# Patient Record
Sex: FEMALE | Race: WHITE
[De-identification: names, ages, dates, MRNs, and addresses within clinical notes are randomized per-mention and may not be internally consistent; named-entity substitution may affect disease eponyms.]

---

## 2019-02-19 ENCOUNTER — HOSPITAL ENCOUNTER (OUTPATIENT)
Dept: HOSPITAL 96 - M.INFUS | Age: 79
End: 2019-02-19
Attending: INTERNAL MEDICINE
Payer: SELF-PAY

## 2019-02-19 VITALS — SYSTOLIC BLOOD PRESSURE: 108 MMHG | DIASTOLIC BLOOD PRESSURE: 60 MMHG

## 2019-02-19 VITALS — SYSTOLIC BLOOD PRESSURE: 104 MMHG | DIASTOLIC BLOOD PRESSURE: 57 MMHG

## 2019-02-19 DIAGNOSIS — D50.9: Primary | ICD-10-CM

## 2019-02-19 DIAGNOSIS — C92.20: ICD-10-CM

## 2019-02-19 DIAGNOSIS — I50.9: ICD-10-CM

## 2019-02-19 LAB
HCT VFR BLD CALC: 20.4 % (ref 37–47)
HGB BLD-MCNC: 6.4 GM/DL (ref 12–15)

## 2019-02-19 NOTE — NUR
1130 - ARRIVED TO INFUSION CLINIC ESCORTED BY  AND DAUGHTER, SETTLED
INTO RECLINER. CALL LIGHT IN REACH. HAZEL HUGGER PROVIDED.
1230 - HAZEL HUGGER REMOVED PER PT REQUEST.
1330 - TRANSFERRED INTO BED WITH STAND BY ASSIST. BLOOD TRANSFUSION INFUSING
WITHOUT DIFFICULTY. FAMILY REMAINS AT BEDSIDE.
1430 - ATE FEW BITES OF LUNCH. TAKING IN PO FLUIDS. STAND BY ASSIST TO
AMBULATE SLOW AND STEADY TO BATHROOM.
1605 - 1ST UNIT COMPLETED WITHOUT ANY ADVERSE REACTION.  REMAINS AT
SIDE.
1700 - AMBULATING TO BATHROOM FREQUENTLY TO VOID D/T LASIX GIVEN. ATE FEW
BITES OF DINNER. REPOSITIONING FROM BED TO RECLINER INTERMITTENLY.
1920 - 2ND UNIT OF BLOOD COMPLETED WITHOUT ADVERSE REACTION. DISCUSSED
DISCHARGE INSTRUCTIONS WITH FAMILY. DISMISSED IN WITH IMPROVED SKIN COLOR AND
GAIT. DISMISSED VIA W/C TO FAMILY VEHICLE.

## 2019-03-24 ENCOUNTER — HOSPITAL ENCOUNTER (INPATIENT)
Dept: HOSPITAL 96 - M.ERS | Age: 79
LOS: 16 days | Discharge: NURSING, REHAB OR LONG-TERM CARE | DRG: 853 | End: 2019-04-09
Attending: INTERNAL MEDICINE | Admitting: INTERNAL MEDICINE
Payer: MEDICARE

## 2019-03-24 VITALS — WEIGHT: 131 LBS | HEIGHT: 65 IN | BODY MASS INDEX: 21.83 KG/M2

## 2019-03-24 VITALS — DIASTOLIC BLOOD PRESSURE: 33 MMHG | SYSTOLIC BLOOD PRESSURE: 82 MMHG

## 2019-03-24 DIAGNOSIS — D69.6: ICD-10-CM

## 2019-03-24 DIAGNOSIS — I42.9: ICD-10-CM

## 2019-03-24 DIAGNOSIS — K52.9: ICD-10-CM

## 2019-03-24 DIAGNOSIS — R00.0: ICD-10-CM

## 2019-03-24 DIAGNOSIS — E87.5: ICD-10-CM

## 2019-03-24 DIAGNOSIS — D58.9: ICD-10-CM

## 2019-03-24 DIAGNOSIS — E87.0: ICD-10-CM

## 2019-03-24 DIAGNOSIS — E44.0: ICD-10-CM

## 2019-03-24 DIAGNOSIS — N17.9: ICD-10-CM

## 2019-03-24 DIAGNOSIS — I13.0: ICD-10-CM

## 2019-03-24 DIAGNOSIS — I85.01: ICD-10-CM

## 2019-03-24 DIAGNOSIS — K76.6: ICD-10-CM

## 2019-03-24 DIAGNOSIS — K74.60: ICD-10-CM

## 2019-03-24 DIAGNOSIS — D62: ICD-10-CM

## 2019-03-24 DIAGNOSIS — E03.9: ICD-10-CM

## 2019-03-24 DIAGNOSIS — E86.0: ICD-10-CM

## 2019-03-24 DIAGNOSIS — I42.0: ICD-10-CM

## 2019-03-24 DIAGNOSIS — I50.43: ICD-10-CM

## 2019-03-24 DIAGNOSIS — R65.21: ICD-10-CM

## 2019-03-24 DIAGNOSIS — A41.9: Primary | ICD-10-CM

## 2019-03-24 DIAGNOSIS — K31.89: ICD-10-CM

## 2019-03-24 DIAGNOSIS — Z88.2: ICD-10-CM

## 2019-03-24 DIAGNOSIS — Z90.49: ICD-10-CM

## 2019-03-24 DIAGNOSIS — K92.2: ICD-10-CM

## 2019-03-24 DIAGNOSIS — Z88.6: ICD-10-CM

## 2019-03-24 DIAGNOSIS — K72.90: ICD-10-CM

## 2019-03-24 DIAGNOSIS — J15.6: ICD-10-CM

## 2019-03-24 DIAGNOSIS — N18.9: ICD-10-CM

## 2019-03-24 DIAGNOSIS — R18.8: ICD-10-CM

## 2019-03-24 DIAGNOSIS — I31.3: ICD-10-CM

## 2019-03-24 DIAGNOSIS — C91.10: ICD-10-CM

## 2019-03-24 DIAGNOSIS — J96.01: ICD-10-CM

## 2019-03-24 DIAGNOSIS — R16.2: ICD-10-CM

## 2019-03-24 DIAGNOSIS — I95.9: ICD-10-CM

## 2019-03-24 DIAGNOSIS — K64.4: ICD-10-CM

## 2019-03-24 DIAGNOSIS — E83.39: ICD-10-CM

## 2019-03-24 DIAGNOSIS — Z88.0: ICD-10-CM

## 2019-03-24 LAB
ALBUMIN SERPL-MCNC: 1.8 G/DL (ref 3.4–5)
ALP SERPL-CCNC: 29 U/L (ref 46–116)
ALT SERPL-CCNC: 6 U/L (ref 30–65)
ANION GAP SERPL CALC-SCNC: 14 MMOL/L (ref 7–16)
APTT BLD: 37.4 SECONDS (ref 25–31.3)
AST SERPL-CCNC: 17 U/L (ref 15–37)
BILIRUB SERPL-MCNC: 0.6 MG/DL
BUN SERPL-MCNC: 59 MG/DL (ref 7–18)
CALCIUM SERPL-MCNC: 6.7 MG/DL (ref 8.5–10.1)
CHLORIDE SERPL-SCNC: 109 MMOL/L (ref 98–107)
CO2 SERPL-SCNC: 18 MMOL/L (ref 21–32)
CREAT SERPL-MCNC: 1.4 MG/DL (ref 0.6–1.3)
GLUCOSE SERPL-MCNC: 117 MG/DL (ref 70–99)
HCT VFR BLD CALC: 13.1 % (ref 37–47)
HGB BLD-MCNC: 3.9 GM/DL (ref 12–15)
INR PPP: 1.8
LIPASE: 80 U/L (ref 73–393)
MCH RBC QN AUTO: 22.8 PG (ref 26–34)
MCHC RBC AUTO-ENTMCNC: 29.4 G/DL (ref 28–37)
MCV RBC: 77.4 FL (ref 80–100)
MPV: 8.3 FL. (ref 7.2–11.1)
NUCLEATED RBCS: 3 /100WBC
PLATELET COUNT*: 205 THOU/UL (ref 150–400)
POTASSIUM SERPL-SCNC: 6.1 MMOL/L (ref 3.5–5.1)
PROT SERPL-MCNC: 3.9 G/DL (ref 6.4–8.2)
PROTHROMBIN TIME: 18.1 SECONDS (ref 9.2–11.5)
RBC # BLD AUTO: 1.7 MIL/UL (ref 4.2–5)
RDW-CV: 24.3 % (ref 10.5–14.5)
SODIUM SERPL-SCNC: 141 MMOL/L (ref 136–145)
TROPONIN-I LEVEL: <0.06 NG/ML (ref ?–0.06)
WBC # BLD AUTO: 25.1 THOU/UL (ref 4–11)

## 2019-03-24 NOTE — PROC
[address]  [address]                    PROCEDURE REPORT              
_______________________________________________________________________________
 
Name:       CHRISTY LR                 Room:           [address] [place]  
M.R.#:  [id number]      Account #:      [id number]  
Admission:  03/25/19     Attend Phys:    ILDA Marmolejo
Discharge:               Date of Birth:  [birth year]  
         Report #: [id number]
                                                                                
_______________________________________________________________________________
THIS REPORT FOR:  //name//                      
 
For GI report, please see the Provation report in Perceptive 7 content.
 
 
 
 
 
 
 
 
 
 
 
 
 
 
 
 
 
 
 
 
 
 
 
 
 
 
 
 
 
 
 
 
 
 
 
 
 
 
 
 
 
                       
                                        By:                                
                 
D: 03/28/19     _______________________________________
T: 04/02/19 1317Medical Records Staff JOSESITO       /AL

## 2019-03-25 VITALS — DIASTOLIC BLOOD PRESSURE: 47 MMHG | SYSTOLIC BLOOD PRESSURE: 104 MMHG

## 2019-03-25 VITALS — SYSTOLIC BLOOD PRESSURE: 112 MMHG | DIASTOLIC BLOOD PRESSURE: 52 MMHG

## 2019-03-25 VITALS — DIASTOLIC BLOOD PRESSURE: 58 MMHG | SYSTOLIC BLOOD PRESSURE: 117 MMHG

## 2019-03-25 VITALS — SYSTOLIC BLOOD PRESSURE: 92 MMHG | DIASTOLIC BLOOD PRESSURE: 37 MMHG

## 2019-03-25 VITALS — SYSTOLIC BLOOD PRESSURE: 79 MMHG | DIASTOLIC BLOOD PRESSURE: 66 MMHG

## 2019-03-25 VITALS — DIASTOLIC BLOOD PRESSURE: 66 MMHG | SYSTOLIC BLOOD PRESSURE: 134 MMHG

## 2019-03-25 VITALS — SYSTOLIC BLOOD PRESSURE: 112 MMHG | DIASTOLIC BLOOD PRESSURE: 62 MMHG

## 2019-03-25 VITALS — SYSTOLIC BLOOD PRESSURE: 111 MMHG | DIASTOLIC BLOOD PRESSURE: 53 MMHG

## 2019-03-25 VITALS — DIASTOLIC BLOOD PRESSURE: 45 MMHG | SYSTOLIC BLOOD PRESSURE: 79 MMHG

## 2019-03-25 VITALS — DIASTOLIC BLOOD PRESSURE: 55 MMHG | SYSTOLIC BLOOD PRESSURE: 111 MMHG

## 2019-03-25 VITALS — DIASTOLIC BLOOD PRESSURE: 53 MMHG | SYSTOLIC BLOOD PRESSURE: 104 MMHG

## 2019-03-25 VITALS — SYSTOLIC BLOOD PRESSURE: 92 MMHG | DIASTOLIC BLOOD PRESSURE: 41 MMHG

## 2019-03-25 VITALS — SYSTOLIC BLOOD PRESSURE: 122 MMHG | DIASTOLIC BLOOD PRESSURE: 55 MMHG

## 2019-03-25 VITALS — DIASTOLIC BLOOD PRESSURE: 34 MMHG | SYSTOLIC BLOOD PRESSURE: 86 MMHG

## 2019-03-25 VITALS — SYSTOLIC BLOOD PRESSURE: 76 MMHG | DIASTOLIC BLOOD PRESSURE: 33 MMHG

## 2019-03-25 VITALS — SYSTOLIC BLOOD PRESSURE: 117 MMHG | DIASTOLIC BLOOD PRESSURE: 55 MMHG

## 2019-03-25 VITALS — DIASTOLIC BLOOD PRESSURE: 41 MMHG | SYSTOLIC BLOOD PRESSURE: 94 MMHG

## 2019-03-25 VITALS — SYSTOLIC BLOOD PRESSURE: 103 MMHG | DIASTOLIC BLOOD PRESSURE: 61 MMHG

## 2019-03-25 VITALS — SYSTOLIC BLOOD PRESSURE: 102 MMHG | DIASTOLIC BLOOD PRESSURE: 51 MMHG

## 2019-03-25 VITALS — SYSTOLIC BLOOD PRESSURE: 84 MMHG | DIASTOLIC BLOOD PRESSURE: 42 MMHG

## 2019-03-25 VITALS — DIASTOLIC BLOOD PRESSURE: 42 MMHG | SYSTOLIC BLOOD PRESSURE: 100 MMHG

## 2019-03-25 VITALS — DIASTOLIC BLOOD PRESSURE: 59 MMHG | SYSTOLIC BLOOD PRESSURE: 101 MMHG

## 2019-03-25 VITALS — DIASTOLIC BLOOD PRESSURE: 55 MMHG | SYSTOLIC BLOOD PRESSURE: 117 MMHG

## 2019-03-25 VITALS — DIASTOLIC BLOOD PRESSURE: 49 MMHG | SYSTOLIC BLOOD PRESSURE: 97 MMHG

## 2019-03-25 VITALS — DIASTOLIC BLOOD PRESSURE: 49 MMHG | SYSTOLIC BLOOD PRESSURE: 107 MMHG

## 2019-03-25 VITALS — DIASTOLIC BLOOD PRESSURE: 42 MMHG | SYSTOLIC BLOOD PRESSURE: 78 MMHG

## 2019-03-25 VITALS — DIASTOLIC BLOOD PRESSURE: 57 MMHG | SYSTOLIC BLOOD PRESSURE: 111 MMHG

## 2019-03-25 VITALS — DIASTOLIC BLOOD PRESSURE: 52 MMHG | SYSTOLIC BLOOD PRESSURE: 112 MMHG

## 2019-03-25 VITALS — SYSTOLIC BLOOD PRESSURE: 92 MMHG | DIASTOLIC BLOOD PRESSURE: 48 MMHG

## 2019-03-25 VITALS — DIASTOLIC BLOOD PRESSURE: 31 MMHG | SYSTOLIC BLOOD PRESSURE: 90 MMHG

## 2019-03-25 VITALS — SYSTOLIC BLOOD PRESSURE: 115 MMHG | DIASTOLIC BLOOD PRESSURE: 48 MMHG

## 2019-03-25 VITALS — DIASTOLIC BLOOD PRESSURE: 57 MMHG | SYSTOLIC BLOOD PRESSURE: 105 MMHG

## 2019-03-25 VITALS — DIASTOLIC BLOOD PRESSURE: 55 MMHG | SYSTOLIC BLOOD PRESSURE: 113 MMHG

## 2019-03-25 VITALS — DIASTOLIC BLOOD PRESSURE: 41 MMHG | SYSTOLIC BLOOD PRESSURE: 104 MMHG

## 2019-03-25 VITALS — DIASTOLIC BLOOD PRESSURE: 39 MMHG | SYSTOLIC BLOOD PRESSURE: 79 MMHG

## 2019-03-25 VITALS — SYSTOLIC BLOOD PRESSURE: 91 MMHG | DIASTOLIC BLOOD PRESSURE: 43 MMHG

## 2019-03-25 VITALS — DIASTOLIC BLOOD PRESSURE: 47 MMHG | SYSTOLIC BLOOD PRESSURE: 102 MMHG

## 2019-03-25 VITALS — DIASTOLIC BLOOD PRESSURE: 41 MMHG | SYSTOLIC BLOOD PRESSURE: 106 MMHG

## 2019-03-25 VITALS — SYSTOLIC BLOOD PRESSURE: 116 MMHG | DIASTOLIC BLOOD PRESSURE: 57 MMHG

## 2019-03-25 VITALS — DIASTOLIC BLOOD PRESSURE: 41 MMHG | SYSTOLIC BLOOD PRESSURE: 92 MMHG

## 2019-03-25 VITALS — SYSTOLIC BLOOD PRESSURE: 126 MMHG | DIASTOLIC BLOOD PRESSURE: 64 MMHG

## 2019-03-25 VITALS — DIASTOLIC BLOOD PRESSURE: 35 MMHG | SYSTOLIC BLOOD PRESSURE: 92 MMHG

## 2019-03-25 VITALS — SYSTOLIC BLOOD PRESSURE: 117 MMHG | DIASTOLIC BLOOD PRESSURE: 60 MMHG

## 2019-03-25 VITALS — SYSTOLIC BLOOD PRESSURE: 107 MMHG | DIASTOLIC BLOOD PRESSURE: 48 MMHG

## 2019-03-25 VITALS — SYSTOLIC BLOOD PRESSURE: 119 MMHG | DIASTOLIC BLOOD PRESSURE: 53 MMHG

## 2019-03-25 VITALS — DIASTOLIC BLOOD PRESSURE: 58 MMHG | SYSTOLIC BLOOD PRESSURE: 122 MMHG

## 2019-03-25 VITALS — DIASTOLIC BLOOD PRESSURE: 31 MMHG | SYSTOLIC BLOOD PRESSURE: 81 MMHG

## 2019-03-25 VITALS — SYSTOLIC BLOOD PRESSURE: 97 MMHG | DIASTOLIC BLOOD PRESSURE: 35 MMHG

## 2019-03-25 VITALS — SYSTOLIC BLOOD PRESSURE: 109 MMHG | DIASTOLIC BLOOD PRESSURE: 47 MMHG

## 2019-03-25 VITALS — SYSTOLIC BLOOD PRESSURE: 115 MMHG | DIASTOLIC BLOOD PRESSURE: 52 MMHG

## 2019-03-25 VITALS — SYSTOLIC BLOOD PRESSURE: 122 MMHG | DIASTOLIC BLOOD PRESSURE: 52 MMHG

## 2019-03-25 VITALS — DIASTOLIC BLOOD PRESSURE: 45 MMHG | SYSTOLIC BLOOD PRESSURE: 102 MMHG

## 2019-03-25 VITALS — SYSTOLIC BLOOD PRESSURE: 107 MMHG | DIASTOLIC BLOOD PRESSURE: 53 MMHG

## 2019-03-25 VITALS — DIASTOLIC BLOOD PRESSURE: 59 MMHG | SYSTOLIC BLOOD PRESSURE: 116 MMHG

## 2019-03-25 VITALS — SYSTOLIC BLOOD PRESSURE: 75 MMHG | DIASTOLIC BLOOD PRESSURE: 35 MMHG

## 2019-03-25 VITALS — DIASTOLIC BLOOD PRESSURE: 50 MMHG | SYSTOLIC BLOOD PRESSURE: 112 MMHG

## 2019-03-25 VITALS — SYSTOLIC BLOOD PRESSURE: 115 MMHG | DIASTOLIC BLOOD PRESSURE: 57 MMHG

## 2019-03-25 VITALS — SYSTOLIC BLOOD PRESSURE: 97 MMHG | DIASTOLIC BLOOD PRESSURE: 40 MMHG

## 2019-03-25 VITALS — DIASTOLIC BLOOD PRESSURE: 56 MMHG | SYSTOLIC BLOOD PRESSURE: 110 MMHG

## 2019-03-25 VITALS — DIASTOLIC BLOOD PRESSURE: 51 MMHG | SYSTOLIC BLOOD PRESSURE: 107 MMHG

## 2019-03-25 VITALS — DIASTOLIC BLOOD PRESSURE: 42 MMHG | SYSTOLIC BLOOD PRESSURE: 97 MMHG

## 2019-03-25 VITALS — DIASTOLIC BLOOD PRESSURE: 48 MMHG | SYSTOLIC BLOOD PRESSURE: 94 MMHG

## 2019-03-25 VITALS — DIASTOLIC BLOOD PRESSURE: 51 MMHG | SYSTOLIC BLOOD PRESSURE: 103 MMHG

## 2019-03-25 VITALS — SYSTOLIC BLOOD PRESSURE: 96 MMHG | DIASTOLIC BLOOD PRESSURE: 44 MMHG

## 2019-03-25 VITALS — SYSTOLIC BLOOD PRESSURE: 97 MMHG | DIASTOLIC BLOOD PRESSURE: 50 MMHG

## 2019-03-25 VITALS — DIASTOLIC BLOOD PRESSURE: 43 MMHG | SYSTOLIC BLOOD PRESSURE: 116 MMHG

## 2019-03-25 VITALS — DIASTOLIC BLOOD PRESSURE: 48 MMHG | SYSTOLIC BLOOD PRESSURE: 95 MMHG

## 2019-03-25 VITALS — DIASTOLIC BLOOD PRESSURE: 49 MMHG | SYSTOLIC BLOOD PRESSURE: 105 MMHG

## 2019-03-25 VITALS — SYSTOLIC BLOOD PRESSURE: 90 MMHG | DIASTOLIC BLOOD PRESSURE: 43 MMHG

## 2019-03-25 VITALS — SYSTOLIC BLOOD PRESSURE: 113 MMHG | DIASTOLIC BLOOD PRESSURE: 50 MMHG

## 2019-03-25 VITALS — DIASTOLIC BLOOD PRESSURE: 45 MMHG | SYSTOLIC BLOOD PRESSURE: 109 MMHG

## 2019-03-25 VITALS — DIASTOLIC BLOOD PRESSURE: 60 MMHG | SYSTOLIC BLOOD PRESSURE: 118 MMHG

## 2019-03-25 VITALS — DIASTOLIC BLOOD PRESSURE: 44 MMHG | SYSTOLIC BLOOD PRESSURE: 105 MMHG

## 2019-03-25 VITALS — SYSTOLIC BLOOD PRESSURE: 118 MMHG | DIASTOLIC BLOOD PRESSURE: 55 MMHG

## 2019-03-25 VITALS — DIASTOLIC BLOOD PRESSURE: 64 MMHG | SYSTOLIC BLOOD PRESSURE: 115 MMHG

## 2019-03-25 VITALS — DIASTOLIC BLOOD PRESSURE: 49 MMHG | SYSTOLIC BLOOD PRESSURE: 103 MMHG

## 2019-03-25 VITALS — DIASTOLIC BLOOD PRESSURE: 44 MMHG | SYSTOLIC BLOOD PRESSURE: 103 MMHG

## 2019-03-25 LAB
%HYPO/RBC NFR BLD AUTO: (no result) %
ABSOLUTE BASOPHILS: 0.3 THOU/UL (ref 0–0.2)
ABSOLUTE BASOPHILS: 0.8 THOU/UL (ref 0–0.2)
ABSOLUTE EOSINOPHILS: 0.7 THOU/UL (ref 0–0.7)
ABSOLUTE EOSINOPHILS: 0.8 THOU/UL (ref 0–0.7)
ABSOLUTE MONOCYTES: 0.3 THOU/UL (ref 0–1.2)
ABSOLUTE MONOCYTES: 7.9 THOU/UL (ref 0–1.2)
ALBUMIN SERPL-MCNC: 2.2 G/DL (ref 3.4–5)
ALP SERPL-CCNC: 44 U/L (ref 46–116)
ALT SERPL-CCNC: 20 U/L (ref 30–65)
ANION GAP SERPL CALC-SCNC: 13 MMOL/L (ref 7–16)
ANION GAP SERPL CALC-SCNC: 14 MMOL/L (ref 7–16)
ANISOCYTOSIS BLD QL SMEAR: (no result)
APTT BLD: 38.1 SECONDS (ref 25–31.3)
AST SERPL-CCNC: 80 U/L (ref 15–37)
BACTERIA-REFLEX: (no result) /HPF
BASOPHILS NFR BLD AUTO: 1 %
BASOPHILS NFR BLD AUTO: 1 %
BE: -12.7 MMOL/L
BE: -13.7 MMOL/L
BE: -8.6 MMOL/L
BILIRUB SERPL-MCNC: 1.1 MG/DL
BILIRUB UR-MCNC: NEGATIVE MG/DL
BLASTS NFR BLD: 1 %
BUN SERPL-MCNC: 61 MG/DL (ref 7–18)
BUN SERPL-MCNC: 64 MG/DL (ref 7–18)
CALCIUM SERPL-MCNC: 7 MG/DL (ref 8.5–10.1)
CALCIUM SERPL-MCNC: 7.4 MG/DL (ref 8.5–10.1)
CHLORIDE SERPL-SCNC: 109 MMOL/L (ref 98–107)
CHLORIDE SERPL-SCNC: 110 MMOL/L (ref 98–107)
CO2 SERPL-SCNC: 17 MMOL/L (ref 21–32)
CO2 SERPL-SCNC: 17 MMOL/L (ref 21–32)
COLOR UR: YELLOW
CREAT SERPL-MCNC: 1.6 MG/DL (ref 0.6–1.3)
CREAT SERPL-MCNC: 1.6 MG/DL (ref 0.6–1.3)
EOSINOPHIL NFR BLD: 0.9 %
EOSINOPHIL NFR BLD: 3 %
GLUCOSE SERPL-MCNC: 104 MG/DL (ref 70–99)
GLUCOSE SERPL-MCNC: 146 MG/DL (ref 70–99)
GRANULOCYTES NFR BLD MANUAL: 71 %
GRANULOCYTES NFR BLD MANUAL: 72.2 %
HCT VFR BLD CALC: 26.2 % (ref 37–47)
HCT VFR BLD CALC: 27.5 % (ref 37–47)
HGB BLD-MCNC: 7.9 GM/DL (ref 12–15)
HGB BLD-MCNC: 8.5 GM/DL (ref 12–15)
INR PPP: 1.5
IRON SERPL-MCNC: 223 UG/DL (ref 50–175)
KETONES UR STRIP-MCNC: NEGATIVE MG/DL
LYMPHOCYTES # BLD: 12.4 THOU/UL (ref 0.8–5.3)
LYMPHOCYTES # BLD: 2 THOU/UL (ref 0.8–5.3)
LYMPHOCYTES NFR BLD AUTO: 15.8 %
LYMPHOCYTES NFR BLD AUTO: 8 %
MCH RBC QN AUTO: 24.9 PG (ref 26–34)
MCH RBC QN AUTO: 25.3 PG (ref 26–34)
MCHC RBC AUTO-ENTMCNC: 30 G/DL (ref 28–37)
MCHC RBC AUTO-ENTMCNC: 30.7 G/DL (ref 28–37)
MCV RBC: 82.3 FL (ref 80–100)
MCV RBC: 82.8 FL (ref 80–100)
METAMYELOCYTES NFR BLD: 3 %
MONOCYTES NFR BLD: 1 %
MONOCYTES NFR BLD: 10.1 %
MPV: 8.6 FL. (ref 7.2–11.1)
MPV: 8.6 FL. (ref 7.2–11.1)
MUCUS: (no result) STRN/LPF
MYELOCYTES NFR BLD: 2 %
NEUTROPHILS # BLD: 21.6 THOU/UL (ref 1.6–8.1)
NEUTROPHILS # BLD: 56.5 THOU/UL (ref 1.6–8.1)
NEUTS BAND NFR BLD: 8 %
NUCLEATED RBCS: 4 /100WBC
PCO2 BLD: 31.5 MMHG (ref 35–45)
PCO2 BLD: 37.1 MMHG (ref 35–45)
PCO2 BLD: 37.8 MMHG (ref 35–45)
PLATELET COUNT*: 232 THOU/UL (ref 150–400)
PLATELET COUNT*: 246 THOU/UL (ref 150–400)
PO2 BLD: 67.1 MMHG (ref 75–100)
PO2 BLD: 95 MMHG (ref 75–100)
PO2 BLD: 99.9 MMHG (ref 75–100)
POLYCHROMASIA BLD QL SMEAR: (no result)
POTASSIUM SERPL-SCNC: 5.9 MMOL/L (ref 3.5–5.1)
PROMYELOCYTES NFR BLD: 2 %
PROT SERPL-MCNC: 4.9 G/DL (ref 6.4–8.2)
PROT UR QL STRIP: (no result)
PROTHROMBIN TIME: 14.9 SECONDS (ref 9.2–11.5)
RBC # BLD AUTO: 3.17 MIL/UL (ref 4.2–5)
RBC # BLD AUTO: 3.35 MIL/UL (ref 4.2–5)
RBC # UR STRIP: (no result) /UL
RBC #/AREA URNS HPF: (no result) /HPF (ref 0–2)
RDW-CV: 20 % (ref 10.5–14.5)
RDW-CV: 21 % (ref 10.5–14.5)
SAO2 % BLD FROM PO2: 95 % (ref 20–39)
SODIUM SERPL-SCNC: 140 MMOL/L (ref 136–145)
SODIUM SERPL-SCNC: 140 MMOL/L (ref 136–145)
SP GR UR STRIP: >= 1.03 (ref 1–1.03)
SQUAMOUS: (no result) /LPF (ref 0–3)
TEARDROPS: (no result)
URINE CLARITY: CLEAR
URINE GLUCOSE-RANDOM: NEGATIVE
URINE LEUKOCYTES-REFLEX: (no result)
URINE NITRITE-REFLEX: NEGATIVE
URINE WBC-REFLEX: (no result) /HPF (ref 0–5)
UROBILINOGEN UR STRIP-ACNC: 0.2 E.U./DL (ref 0.2–1)

## 2019-03-25 PROCEDURE — 5A1945Z RESPIRATORY VENTILATION, 24-96 CONSECUTIVE HOURS: ICD-10-PCS | Performed by: INTERNAL MEDICINE

## 2019-03-25 PROCEDURE — 30233N1 TRANSFUSION OF NONAUTOLOGOUS RED BLOOD CELLS INTO PERIPHERAL VEIN, PERCUTANEOUS APPROACH: ICD-10-PCS | Performed by: INTERNAL MEDICINE

## 2019-03-25 PROCEDURE — 0BH17EZ INSERTION OF ENDOTRACHEAL AIRWAY INTO TRACHEA, VIA NATURAL OR ARTIFICIAL OPENING: ICD-10-PCS | Performed by: INTERNAL MEDICINE

## 2019-03-25 PROCEDURE — 30233K1 TRANSFUSION OF NONAUTOLOGOUS FROZEN PLASMA INTO PERIPHERAL VEIN, PERCUTANEOUS APPROACH: ICD-10-PCS | Performed by: INTERNAL MEDICINE

## 2019-03-25 PROCEDURE — 06LY4CC OCCLUSION OF HEMORRHOIDAL PLEXUS WITH EXTRALUMINAL DEVICE, PERCUTANEOUS ENDOSCOPIC APPROACH: ICD-10-PCS | Performed by: INTERNAL MEDICINE

## 2019-03-25 PROCEDURE — 30233R1 TRANSFUSION OF NONAUTOLOGOUS PLATELETS INTO PERIPHERAL VEIN, PERCUTANEOUS APPROACH: ICD-10-PCS | Performed by: INTERNAL MEDICINE

## 2019-03-25 PROCEDURE — 02HV33Z INSERTION OF INFUSION DEVICE INTO SUPERIOR VENA CAVA, PERCUTANEOUS APPROACH: ICD-10-PCS | Performed by: INTERNAL MEDICINE

## 2019-03-25 NOTE — EKG
[place] [address]
Phone:  [phone]                     ELECTROCARDIOGRAM REPORT      
_______________________________________________________________________________
 
Name:       CHRISTY LR                 Room:           [address]    ADM IN  
M.R.#:  [id number]      Account #:      [id number]  
Admission:  19     Attend Phys:    ILDA Marmolejo
Discharge:               Date of Birth:  [birth year]  
         Report #: [id number]
    [id number]
_______________________________________________________________________________
THIS REPORT FOR:  //name//                      
 
                         [facility] ED
                                       
Test Date:    2019               Test Time:    22:53:25
Pat Name:     CHRISTY LR             Department:   
Patient ID:   [id number]            Room:         [facility]
Gender:       F                        Technician:   ASHLEIGH
:          1940               Requested By: Sohail Ly
Order Number: [id number] MD:   Paul Wilson
                                 Measurements
Intervals                              Axis          
Rate:         72                       P:            52
CA:           185                      QRS:          36
QRSD:         135                      T:            
QT:           442                                    
QTc:          484                                    
                           Interpretive Statements
sinus rhythm with first degree av block
pac's and pvc
Nonspecific intraventricular conduction delay
Nonspecific T abnrm, anterolateral leads
No previous ECG available for comparison
 
Electronically Signed On 3- 10:02:56 CDT by Paul Wilson
[email or web address]
 
 
 
 
 
 
 
 
 
 
 
 
 
 
 
 
 
  <ELECTRONICALLY SIGNED>
                                           By: Paul Wilson MD, FACC      
  19     1002
D: 19 2253   _____________________________________
T: 19 2253   [facility] MD, [facility]        /EPI

## 2019-03-25 NOTE — EKG
[place] [address]
Phone:  [phone]                     ELECTROCARDIOGRAM REPORT      
_______________________________________________________________________________
 
Name:       CHRISTY LR                 Room:           [address]    ADM [place]  
M.R.#:  [id number]      Account #:      [id number]  
Admission:  19     Attend Phys:    ILDA Marmolejo
Discharge:               Date of Birth:  [birth year]  
         Report #: [id number]
    [id number]
_______________________________________________________________________________
THIS REPORT FOR:  //name//                      
 
                         [facility] ED
                                       
Test Date:    2019               Test Time:    22:54:06
Pat Name:     CHRISTY LR             Department:   
Patient ID:   [id number]            Room:         [address]
Gender:       F                        Technician:   ASHLEIGH
:          1940               Requested By: Sohail Ly
Order Number: [id number]    Isaiah MD:   Paul Wilson
                                 Measurements
Intervals                              Axis          
Rate:         83                       P:            51
OK:           141                      QRS:          35
QRSD:         151                      T:            149
QT:           446                                    
QTc:          525                                    
                           Interpretive Statements
Sinus rhythm
Atrial premature complex
Nonspecific intraventricular conduction delay
Borderline T abnormalities, lateral leads
Compared to ECG 2019 22:53:25
Atrial premature complex(es) now present
T-wave abnormality now present
First degree AV block no longer present
Ventricular premature complex(es) no longer present
 
Electronically Signed On 3- 17:53:45 CDT by Paul Wilson
[email or web address]
 
 
 
 
 
 
 
 
 
 
 
 
 
  <ELECTRONICALLY SIGNED>
                                           By: Paul Wilson MD, [facility]      
  19     1753
D: 19 2254   _____________________________________
T: 19 2254   Paul Wilson MD, [facility]        /EPI

## 2019-03-25 NOTE — EKG
[place] [address]
Phone:  [phone]                     ELECTROCARDIOGRAM REPORT      
_______________________________________________________________________________
 
Name:       CHRISTY LR                 Room:           [address]    ADM IN  
M.R.#:  [id number]      Account #:      [id number]  
Admission:  19     Attend Phys:    ILDA Marmolejo
Discharge:               Date of Birth:  [birth year]  
         Report #: [id number]
    [id number]
_______________________________________________________________________________
THIS REPORT FOR:  //name//                      
 
                          [facility]
                                       
Test Date:    2019               Test Time:    14:31:23
Pat Name:     CHRISTY LR             Department:   
Patient ID:   [id number]            Room:         [address]
Gender:       F                        Technician:   
:          1940               Requested By: Karishma Mendez
Order Number: [id number]    Reading MD:   Paul Wilson
                                 Measurements
Intervals                              Axis          
Rate:         88                       P:            
AZ:                                    QRS:          52
QRSD:         116                      T:            135
QT:           379                                    
QTc:          459                                    
                           Interpretive Statements
Accelerated junctional rhythm
Nonspecific intraventricular conduction delay
Low voltage, extremity leads
Borderline repolarization abnormality
Compared to ECG 2019 22:53:25
Accelerated junctional rhythm now present
Low QRS voltage now present
 
 
 
Electronically Signed On 3- 18:05:05 CDT by Paul Wilson
[email or web address]
 
 
 
 
 
 
 
 
 
 
 
 
 
  <ELECTRONICALLY SIGNED>
                                           By: Paul Wilson MD, FAC      
  19     1805
D: 19 1431   _____________________________________
T: 19 1431   Paul Wilson MD, [facility]        /EPI

## 2019-03-25 NOTE — 2DMMODE
[place] [address]
Phone:  [phone]                     2 D/M-MODE ECHOCARDIOGRAM     
_______________________________________________________________________________
 
Name:         CHRISTY LR                Room:          [address] [place] 
[facility]#:    [id number]     Account #:     [id number]  
Admission:    19    Attend Phys:   Dung Chavarria
Discharge:                Date of Birth: [birth year]  
Date of Service: 19 1740  Report #:      [id number]
        [id number]
_______________________________________________________________________________
THIS REPORT FOR:  //name//                      
 
 
--------------- APPROVED REPORT --------------
 
 
Study performed:  2019 16:04:00
 
EXAM: Comprehensive 2D, Doppler, and color-flow 
Echocardiogram 
Patient Location: In-Patient   
Room #:  [facility]     Status:  routine
 
     BSA:         1.62
HR: 96 bpm BP:          97/40 mmHg 
Rhythm: NSR    
 
Other Information 
Study Quality: Good
 
Indications
Cardiomegaly
 
2D Dimensions
IVSd:  11.83 (7-11mm) LVOT Diam:  21.37 (18-24mm) 
LVDd:  66.32 mm  
PWd:  9.27 (7-11mm) Ascending Ao:  36.33 (22-36mm)
LVDs:  55.43 (25-40mm) 
Aortic Root:  31.72 mm 
 
Volumes
Left Atrial Volume (Systole) 
    LA ESV Index:  83.50 mL/m2
 
Aortic Valve
AoV Peak Gerhard.:  1.72 m/s 
AO Peak Gr.:  11.82 mmHg LVOT Max P.97 mmHg
AO Mean Gr.:  6.45 mmHg  LVOT Mean PG:  3.25 mmHg
    LVOT Max V:  1.32 m/s
AO V2 VTI:  21.04 cm  LVOT Mean V:  0.82 m/s
NIC (VTI):  2.81 cm2  LVOT V1 VTI:  16.48 cm
 
TDI
 Medial E' Gerhard.:  0.08 m/s
 Lateral E' Gerhard.:  0.10 m/s
 
 
 
[place] [address]
Phone:  [phone]                     2 D/M-MODE ECHOCARDIOGRAM     
_______________________________________________________________________________
 
Name:         CHRISTY LR                Room:          [address] [place] 
..#:    [id number]     Account #:     [id number]  
Admission:    19    Attend Phys:   Dung Chavarria
Discharge:                Date of Birth: [birth year]  
Date of Service: 19 1740  Report #:      [id number]
        [id number]
_______________________________________________________________________________
Pulmonary Valve
PV Peak Gerhard.:  1.22 m/s PV Peak Gr.:  5.98 mmHg
 
Tricuspid Valve
    RAP Estimate:  10.00 mmHg
TR Peak Gr.:  55.00 mmHg RVSP:  65.00 mmHg
    PA Pressure:  65.00 mmHg
 
Left Ventricle
Left ventricle is moderate to severely dilated. There is global 
hypokinesis of the left ventricle. There is normal left ventricular 
wall thickness. Left ventricular systolic function is moderately 
decreased. LVEF is 30-35%. The left ventricular diastolic function is 
normal.
 
Right Ventricle
Right ventricle is dilated. The right ventricular systolic function 
is normal.
 
Atria
Left atrium is severely dilated. Right atrium is dilated.
 
Aortic Valve
The aortic valve is normal in structure. No aortic regurgitation is 
present. There is no aortic valvular stenosis.
 
Mitral Valve
The mitral valve is normal in structure. Moderate mitral 
regurgitation. No evidence of mitral valve stenosis.
 
Tricuspid Valve
The tricuspid valve is normal in structure. Severe tricuspid 
regurgitation. estimated pa pressure 60 mm Hg
 
Pulmonic Valve
The pulmonary valve is normal in structure. Mild pulmonic 
regurgitation.
 
Great Vessels
The aortic root is normal in size. The IVC is 
dilated.
 
Pericardium
Mild pericardial effusion. Left pleural 
effusion.
 
 
 
[place] [address]
Phone:  [phone]                     2 D/M-MODE ECHOCARDIOGRAM     
_______________________________________________________________________________
 
Name:         GERARDO LRVAUGHN IVAN                Room:          [address] [place] 
[facility]#:    [id number]     Account #:     [id number]  
Admission:    19    Attend Phys:   Dung Chavarria
Discharge:                Date of Birth: [birth year]  
Date of Service: 19  Report #:      [id number]
        [id number]
_______________________________________________________________________________
<Conclusion>
LVEF is 30-35%.
Right ventricle is dilated.
Moderate mitral regurgitation.
Severe tricuspid regurgitation.
estimated pa pressure 60 mm Hg
Mild pericardial effusion.
 
 
 
 
 
 
 
 
 
 
 
 
 
 
 
 
 
 
 
 
 
 
 
 
 
 
 
 
 
 
 
 
 
 
 
 
 
  <ELECTRONICALLY SIGNED>
                                           By: Paul Wilson MD, [facility]      
  19
D: 19   _____________________________________
T: 19   Paul Wilson MD, FACC        /INF

## 2019-03-26 VITALS — DIASTOLIC BLOOD PRESSURE: 53 MMHG | SYSTOLIC BLOOD PRESSURE: 107 MMHG

## 2019-03-26 VITALS — SYSTOLIC BLOOD PRESSURE: 94 MMHG | DIASTOLIC BLOOD PRESSURE: 44 MMHG

## 2019-03-26 VITALS — SYSTOLIC BLOOD PRESSURE: 93 MMHG | DIASTOLIC BLOOD PRESSURE: 40 MMHG

## 2019-03-26 VITALS — SYSTOLIC BLOOD PRESSURE: 85 MMHG | DIASTOLIC BLOOD PRESSURE: 41 MMHG

## 2019-03-26 VITALS — SYSTOLIC BLOOD PRESSURE: 91 MMHG | DIASTOLIC BLOOD PRESSURE: 45 MMHG

## 2019-03-26 VITALS — SYSTOLIC BLOOD PRESSURE: 104 MMHG | DIASTOLIC BLOOD PRESSURE: 57 MMHG

## 2019-03-26 VITALS — DIASTOLIC BLOOD PRESSURE: 57 MMHG | SYSTOLIC BLOOD PRESSURE: 112 MMHG

## 2019-03-26 VITALS — SYSTOLIC BLOOD PRESSURE: 90 MMHG | DIASTOLIC BLOOD PRESSURE: 45 MMHG

## 2019-03-26 VITALS — DIASTOLIC BLOOD PRESSURE: 51 MMHG | SYSTOLIC BLOOD PRESSURE: 93 MMHG

## 2019-03-26 VITALS — SYSTOLIC BLOOD PRESSURE: 123 MMHG | DIASTOLIC BLOOD PRESSURE: 61 MMHG

## 2019-03-26 VITALS — SYSTOLIC BLOOD PRESSURE: 119 MMHG | DIASTOLIC BLOOD PRESSURE: 64 MMHG

## 2019-03-26 VITALS — SYSTOLIC BLOOD PRESSURE: 125 MMHG | DIASTOLIC BLOOD PRESSURE: 69 MMHG

## 2019-03-26 VITALS — SYSTOLIC BLOOD PRESSURE: 98 MMHG | DIASTOLIC BLOOD PRESSURE: 57 MMHG

## 2019-03-26 VITALS — SYSTOLIC BLOOD PRESSURE: 85 MMHG | DIASTOLIC BLOOD PRESSURE: 45 MMHG

## 2019-03-26 VITALS — SYSTOLIC BLOOD PRESSURE: 98 MMHG | DIASTOLIC BLOOD PRESSURE: 43 MMHG

## 2019-03-26 VITALS — SYSTOLIC BLOOD PRESSURE: 84 MMHG | DIASTOLIC BLOOD PRESSURE: 45 MMHG

## 2019-03-26 VITALS — SYSTOLIC BLOOD PRESSURE: 87 MMHG | DIASTOLIC BLOOD PRESSURE: 45 MMHG

## 2019-03-26 VITALS — SYSTOLIC BLOOD PRESSURE: 102 MMHG | DIASTOLIC BLOOD PRESSURE: 47 MMHG

## 2019-03-26 VITALS — SYSTOLIC BLOOD PRESSURE: 94 MMHG | DIASTOLIC BLOOD PRESSURE: 50 MMHG

## 2019-03-26 VITALS — DIASTOLIC BLOOD PRESSURE: 47 MMHG | SYSTOLIC BLOOD PRESSURE: 87 MMHG

## 2019-03-26 VITALS — SYSTOLIC BLOOD PRESSURE: 67 MMHG | DIASTOLIC BLOOD PRESSURE: 33 MMHG

## 2019-03-26 VITALS — DIASTOLIC BLOOD PRESSURE: 46 MMHG | SYSTOLIC BLOOD PRESSURE: 88 MMHG

## 2019-03-26 VITALS — DIASTOLIC BLOOD PRESSURE: 59 MMHG | SYSTOLIC BLOOD PRESSURE: 117 MMHG

## 2019-03-26 VITALS — SYSTOLIC BLOOD PRESSURE: 91 MMHG | DIASTOLIC BLOOD PRESSURE: 46 MMHG

## 2019-03-26 VITALS — SYSTOLIC BLOOD PRESSURE: 93 MMHG | DIASTOLIC BLOOD PRESSURE: 49 MMHG

## 2019-03-26 VITALS — DIASTOLIC BLOOD PRESSURE: 40 MMHG | SYSTOLIC BLOOD PRESSURE: 78 MMHG

## 2019-03-26 VITALS — DIASTOLIC BLOOD PRESSURE: 44 MMHG | SYSTOLIC BLOOD PRESSURE: 87 MMHG

## 2019-03-26 VITALS — SYSTOLIC BLOOD PRESSURE: 97 MMHG | DIASTOLIC BLOOD PRESSURE: 51 MMHG

## 2019-03-26 VITALS — DIASTOLIC BLOOD PRESSURE: 57 MMHG | SYSTOLIC BLOOD PRESSURE: 108 MMHG

## 2019-03-26 VITALS — SYSTOLIC BLOOD PRESSURE: 111 MMHG | DIASTOLIC BLOOD PRESSURE: 61 MMHG

## 2019-03-26 VITALS — SYSTOLIC BLOOD PRESSURE: 96 MMHG | DIASTOLIC BLOOD PRESSURE: 52 MMHG

## 2019-03-26 VITALS — DIASTOLIC BLOOD PRESSURE: 53 MMHG | SYSTOLIC BLOOD PRESSURE: 102 MMHG

## 2019-03-26 VITALS — DIASTOLIC BLOOD PRESSURE: 45 MMHG | SYSTOLIC BLOOD PRESSURE: 81 MMHG

## 2019-03-26 VITALS — SYSTOLIC BLOOD PRESSURE: 95 MMHG | DIASTOLIC BLOOD PRESSURE: 49 MMHG

## 2019-03-26 VITALS — DIASTOLIC BLOOD PRESSURE: 45 MMHG | SYSTOLIC BLOOD PRESSURE: 94 MMHG

## 2019-03-26 VITALS — DIASTOLIC BLOOD PRESSURE: 48 MMHG | SYSTOLIC BLOOD PRESSURE: 89 MMHG

## 2019-03-26 VITALS — DIASTOLIC BLOOD PRESSURE: 49 MMHG | SYSTOLIC BLOOD PRESSURE: 91 MMHG

## 2019-03-26 VITALS — DIASTOLIC BLOOD PRESSURE: 60 MMHG | SYSTOLIC BLOOD PRESSURE: 117 MMHG

## 2019-03-26 VITALS — DIASTOLIC BLOOD PRESSURE: 40 MMHG | SYSTOLIC BLOOD PRESSURE: 77 MMHG

## 2019-03-26 VITALS — DIASTOLIC BLOOD PRESSURE: 56 MMHG | SYSTOLIC BLOOD PRESSURE: 111 MMHG

## 2019-03-26 VITALS — SYSTOLIC BLOOD PRESSURE: 69 MMHG | DIASTOLIC BLOOD PRESSURE: 42 MMHG

## 2019-03-26 VITALS — DIASTOLIC BLOOD PRESSURE: 65 MMHG | SYSTOLIC BLOOD PRESSURE: 124 MMHG

## 2019-03-26 VITALS — DIASTOLIC BLOOD PRESSURE: 49 MMHG | SYSTOLIC BLOOD PRESSURE: 90 MMHG

## 2019-03-26 VITALS — DIASTOLIC BLOOD PRESSURE: 41 MMHG | SYSTOLIC BLOOD PRESSURE: 79 MMHG

## 2019-03-26 VITALS — SYSTOLIC BLOOD PRESSURE: 102 MMHG | DIASTOLIC BLOOD PRESSURE: 54 MMHG

## 2019-03-26 VITALS — SYSTOLIC BLOOD PRESSURE: 100 MMHG | DIASTOLIC BLOOD PRESSURE: 50 MMHG

## 2019-03-26 VITALS — DIASTOLIC BLOOD PRESSURE: 57 MMHG | SYSTOLIC BLOOD PRESSURE: 113 MMHG

## 2019-03-26 VITALS — SYSTOLIC BLOOD PRESSURE: 105 MMHG | DIASTOLIC BLOOD PRESSURE: 52 MMHG

## 2019-03-26 VITALS — DIASTOLIC BLOOD PRESSURE: 62 MMHG | SYSTOLIC BLOOD PRESSURE: 119 MMHG

## 2019-03-26 VITALS — DIASTOLIC BLOOD PRESSURE: 60 MMHG | SYSTOLIC BLOOD PRESSURE: 121 MMHG

## 2019-03-26 VITALS — SYSTOLIC BLOOD PRESSURE: 109 MMHG | DIASTOLIC BLOOD PRESSURE: 46 MMHG

## 2019-03-26 VITALS — DIASTOLIC BLOOD PRESSURE: 59 MMHG | SYSTOLIC BLOOD PRESSURE: 102 MMHG

## 2019-03-26 VITALS — SYSTOLIC BLOOD PRESSURE: 74 MMHG | DIASTOLIC BLOOD PRESSURE: 38 MMHG

## 2019-03-26 VITALS — DIASTOLIC BLOOD PRESSURE: 61 MMHG | SYSTOLIC BLOOD PRESSURE: 125 MMHG

## 2019-03-26 VITALS — SYSTOLIC BLOOD PRESSURE: 92 MMHG | DIASTOLIC BLOOD PRESSURE: 51 MMHG

## 2019-03-26 VITALS — SYSTOLIC BLOOD PRESSURE: 93 MMHG | DIASTOLIC BLOOD PRESSURE: 46 MMHG

## 2019-03-26 VITALS — DIASTOLIC BLOOD PRESSURE: 47 MMHG | SYSTOLIC BLOOD PRESSURE: 90 MMHG

## 2019-03-26 VITALS — DIASTOLIC BLOOD PRESSURE: 41 MMHG | SYSTOLIC BLOOD PRESSURE: 102 MMHG

## 2019-03-26 VITALS — SYSTOLIC BLOOD PRESSURE: 72 MMHG | DIASTOLIC BLOOD PRESSURE: 50 MMHG

## 2019-03-26 LAB
ABSOLUTE BASOPHILS: 0.4 THOU/UL (ref 0–0.2)
ABSOLUTE EOSINOPHILS: 0.4 THOU/UL (ref 0–0.7)
ABSOLUTE MONOCYTES: 3.9 THOU/UL (ref 0–1.2)
ALBUMIN SERPL-MCNC: 2.2 G/DL (ref 3.4–5)
ALP SERPL-CCNC: 49 U/L (ref 46–116)
ALT SERPL-CCNC: 19 U/L (ref 30–65)
ANION GAP SERPL CALC-SCNC: 8 MMOL/L (ref 7–16)
AST SERPL-CCNC: 46 U/L (ref 15–37)
BASOPHILS NFR BLD AUTO: 1.1 %
BE: -8.4 MMOL/L
BILIRUB SERPL-MCNC: 1 MG/DL
BUN SERPL-MCNC: 60 MG/DL (ref 7–18)
CALCIUM SERPL-MCNC: 6.9 MG/DL (ref 8.5–10.1)
CHLORIDE SERPL-SCNC: 110 MMOL/L (ref 98–107)
CO2 SERPL-SCNC: 21 MMOL/L (ref 21–32)
CREAT SERPL-MCNC: 1.4 MG/DL (ref 0.6–1.3)
EOSINOPHIL NFR BLD: 1 %
GLUCOSE SERPL-MCNC: 158 MG/DL (ref 70–99)
GRANULOCYTES NFR BLD MANUAL: 77.5 %
HCT VFR BLD CALC: 22.9 % (ref 37–47)
HCT VFR BLD CALC: 24.3 % (ref 37–47)
HCT VFR BLD CALC: 25.4 % (ref 37–47)
HGB BLD-MCNC: 7.6 GM/DL (ref 12–15)
HGB BLD-MCNC: 7.7 GM/DL (ref 12–15)
HGB BLD-MCNC: 8.3 GM/DL (ref 12–15)
INR PPP: 1.4
LYMPHOCYTES # BLD: 3.6 THOU/UL (ref 0.8–5.3)
LYMPHOCYTES NFR BLD AUTO: 9.8 %
MAGNESIUM SERPL-MCNC: 1.7 MG/DL (ref 1.8–2.4)
MCH RBC QN AUTO: 25 PG (ref 26–34)
MCH RBC QN AUTO: 25.5 PG (ref 26–34)
MCHC RBC AUTO-ENTMCNC: 31.5 G/DL (ref 28–37)
MCHC RBC AUTO-ENTMCNC: 32.7 G/DL (ref 28–37)
MCV RBC: 77.8 FL (ref 80–100)
MCV RBC: 79.3 FL (ref 80–100)
MONOCYTES NFR BLD: 10.6 %
MPV: 8.2 FL. (ref 7.2–11.1)
MPV: 9.3 FL. (ref 7.2–11.1)
NEUTROPHILS # BLD: 28.3 THOU/UL (ref 1.6–8.1)
NUCLEATED RBCS: 2 /100WBC
PCO2 BLD: 35.2 MMHG (ref 35–45)
PLATELET COUNT*: 134 THOU/UL (ref 150–400)
PLATELET COUNT*: 135 THOU/UL (ref 150–400)
PO2 BLD: 119 MMHG (ref 75–100)
POTASSIUM SERPL-SCNC: 4.5 MMOL/L (ref 3.5–5.1)
POTASSIUM SERPL-SCNC: 6.1 MMOL/L (ref 3.5–5.1)
PROT SERPL-MCNC: 5.1 G/DL (ref 6.4–8.2)
PROTHROMBIN TIME: 14.4 SECONDS (ref 9.2–11.5)
RBC # BLD AUTO: 3.07 MIL/UL (ref 4.2–5)
RBC # BLD AUTO: 3.27 MIL/UL (ref 4.2–5)
RDW-CV: 20.8 % (ref 10.5–14.5)
RDW-CV: 21.2 % (ref 10.5–14.5)
SODIUM SERPL-SCNC: 139 MMOL/L (ref 136–145)
WBC # BLD AUTO: 36.5 THOU/UL (ref 4–11)
WBC # BLD AUTO: 45.6 THOU/UL (ref 4–11)
WBC # BLD AUTO: 68 THOU/UL (ref 4–11)
WBC # BLD AUTO: 78.2 THOU/UL (ref 4–11)

## 2019-03-26 NOTE — CON
[address]  [address]                    CONSULTATION                  
_______________________________________________________________________________
 
Name:       CHRISTY LR                 Room:           [address] [place]  
.R.#:  [id number]      Account #:      [id number]  
Admission:  03/25/19     Attend Phys:    ILDA Marmolejo
Discharge:               Date of Birth:  [birth year]  
         Report #: [id number]
                                                                     [id number]  
_______________________________________________________________________________
THIS REPORT FOR:  //name//                      
 
CC: Janice Chavarria
 
DATE OF SERVICE:  03/25/2019
 
 
REFERRING PHYSICIAN:  Cass Mendez M.D.
 
CHIEF COMPLAINT:  Pulmonary infiltrate, respiratory failure, GI bleed.
 
HISTORY OF PRESENT ILLNESS:  The patient is a 78-year-old female who was
admitted to the hospital on 03/25/2019 and this document is produced on
03/25/2019.
 
According to the family, the patient who is quite lethargic and then a slight
degree of respiratory distress, she had been having some bleeding per rectum. 
The patient apparently started having GI bleeding yesterday, was brought to the
Emergency Room.  She has a history of chronic lymphocytic leukemia, although she
has not been on treatment.  The patient is experiencing some distress.  She is
on a Ventimask.  She is not having any abdominal pain or chest pain.
 
According to the patient, she has not had any nausea or vomiting.  She is a
lifelong nonsmoker with no history of respiratory disease.  According to the
daughter, the patient was in [facility] back in 07/2018, was told that
there was a mass in her lung as well as having an enlarged spleen.  The mass is
in her lung was never investigated with a biopsy.  According to the daughter,
the doctors at that institution wanted to remove the patient's spleen, but felt
that she was not in a physical condition from a cardiac standpoint to undergo
such a procedure.
 
PAST MEDICAL HISTORY:  Significant for anemia, colitis, GI bleed, CLL,
pneumonia.  She has a history of hypothyroidism, hepatosplenomegaly as well.
 
ALLERGIES:  CODEINE, PENICILLIN, AND SULFA DRUGS.
 
SOCIAL HISTORY:  She is .  She is a nonsmoker.
 
REVIEW OF SYSTEMS:
CONSTITUTIONAL:  She denies fever or chills.
HEENT:  Head:  She denies headache, blurred vision.  Oral cavity:  She denies
trouble swallowing.
RESPIRATORY:  She is denying respiratory distress.  She denies cough, phlegm
production.
GASTROINTESTINAL:  She complains of rectal bleeding, also abdominal distention. 
 
 
 
[place] [address]                    CONSULTATION                  
_______________________________________________________________________________
 
Name:       CHRISTY LR MONCHO                 Room:           [address]#:  [id number]      Account #:      [id number]  
Admission:  03/25/19     Attend Phys:    ILDA Marmolejo
Discharge:               Date of Birth:  [birth year]  
         Report #: [id number]
                                                                     [id number]  
_______________________________________________________________________________
She is aware of the fact she has hepatosplenomegaly.
GENITOURINARY:  Negative for dysuria or hematuria.
MUSCULOSKELETAL:  She denies arthritis, swelling in the joints.
SKIN:  She is denying lesions or rash.
NEUROLOGIC:  She denies headache, blurred vision, numbness, tingling.
 
MEDICATIONS:  Protonix; levothyroxine; Levaquin; potassium, magnesium,
phosphorus replacement; metronidazole.
 
PHYSICAL EXAMINATION:
VITAL SIGNS:  Blood pressure 97/40, respiratory rate 32, pulse rate 120,
temperature 97.8 degrees.  Weight 125 pounds.
GENERAL APPEARANCE:  The patient is awake, alert.  She is responsive and
provides a very sketchy history at this time, although she attempts to do so. 
She denies being in distress at this time.
EYES:  Pupils are round, equal, reactive.  No scleral icterus.
EARS:  Auricular structures are normal.  Auditory canals are free of drainage.
ORAL CAVITY:  Moist, no lesions.
NECK:  There is no adenopathy or JVD.
CHEST:  Reveals diminished breath sounds, crackles on the right.
CARDIOVASCULAR:  Resting sinus tachycardia, distant heart tones.  Cannot
appreciate any S3, S4.  S1 and S2 appear normal.
ABDOMEN:  Protuberant.  There is evidence of hepatosplenomegaly on palpation. 
The abdomen otherwise is soft.  There is no tenderness or guarding.
EXTREMITIES:  Positive for edema bilaterally, trace to 1+.
SKIN:  Warm, intact, dry, slightly pale.
NEUROLOGIC:  She is able to move all 4.  She is extremely weak.  No pathologic
reflexes.
MUSCULOSKELETAL:  Without joint swelling or limitation.  No redness.
 
LABORATORY DATA:  Arterial blood gas was obtained today about 10:00 on the 50%
Ventimask with a pH of 7.25, pCO2 of 32, pO2 of 67 and a bicarbonate of 13. 
Urinalysis:  3-10 rbc's per high-powered field, there are 0-5 wbc's per
high-powered field.  Hemoglobin and hematocrit of 3.9 and 13 with a white count
of 25,000.  Repeat CBC after she received blood products with a hemoglobin of
7.9, hematocrit of 26, white count of 68,000.  Sodium 140, potassium 6.1,
chloride 109, CO2 of 17, BUN of 61, creatinine 1.6, EGFR of 36.
 
MEDICAL IMAGING STUDIES:  Her initial chest x-ray was interpreted as showing
cardiomegaly with an infiltrate on the right side.  CT of the brain:  No acute
intracranial process.  CT of the abdomen:  Hepatosplenomegaly, marked
enlargement of the spleen, moderate amount of abdominal and pelvic ascites,
evidence of colitis on the CT.  There is a cholelithiasis, marked cardiomegaly
with a pericardial effusion, right lower lobe infiltrate.  Hepatic cysts are
present.  CT of the chest performed revealed cardiomegaly, pulmonary infiltrate.
 On the CT of the chest, there is no report of a mass effect.
 
 
 
[place] [address]                    CONSULTATION                  
_______________________________________________________________________________
 
Name:       CHRISTY LR                 Room:           [address] [place]  
.R.#:  [id number]      Account #:      [id number]  
Admission:  03/25/19     Attend Phys:    ILDA Marmolejo
Discharge:               Date of Birth:  [birth year]  
         Report #: [id number]
                                                                     [id number]  
_______________________________________________________________________________
 
ASSESSMENT:
1.  Acute respiratory insufficiency/failure requiring intubation.
2.  Gastrointestinal bleed.
3.  Chronic lymphocytic leukemia.
4.  History of cardiomyopathy.
5.  Hepatosplenomegaly.
6.  Pneumonia.
7.  Anemia.
 
RECOMMENDATION:  The patient is getting blood products.  GI has been consulted.
 
Because of her tenuous state, she is going to be intubated.  GI will conduct
appropriate procedures for evaluation regarding her GI bleed.
 
Blood products have been ordered and she will continue this for the time being. 
In the interim, the ventilator will be adjusted accordingly after the initial
set up.  Arterial blood gases, followup chest x-ray for ET tube placement.  In
addition, aerosol treatments, continue with antibiotic therapy.  Sputum for
culture and sensitivity will also be obtained.
 
I had a discussion with the daughter.  The patient is interested in pursuing
aggressive therapy or treatment, but the daughter is aware of the fact that the
prognosis is extremely poor and the patient obviously may not survive this
event.
 
We will obtain followup x-rays and blood gases in the a.m.
 
35 minutes spent in critical care time.
 
 
 
 
 
 
 
 
 
 
 
 
 
 
 
<ELECTRONICALLY SIGNED>
                                        By:  Mark Carter MD              
03/26/19     1344
D: 03/25/19 1141_______________________________________
T: 03/25/19 1319Alrian Good MD          /nt

## 2019-03-26 NOTE — EKG
[place] [address]
Phone:  [phone]                     ELECTROCARDIOGRAM REPORT      
_______________________________________________________________________________
 
Name:       CHRISTY LR                 Room:           [address]    ADM IN  
M.R.#:  [id number]      Account #:      [id number]  
Admission:  19     Attend Phys:    ILDA Marmolejo
Discharge:               Date of Birth:  [birth year]  
         Report #: [id number]
    [id number]
_______________________________________________________________________________
THIS REPORT FOR:  //name//                      
 
                          [facility]
                                       
Test Date:    2019               Test Time:    03:56:28
Pat Name:     CHRISTY LR             Department:   
Patient ID:   [id number]            Room:         [address]
Gender:       F                        Technician:   Emilio Doe
:          1940               Requested By: Dung Chavarria
Order Number: [id number]    Isaiah MD:   Tacho Ortega
                                 Measurements
Intervals                              Axis          
Rate:         151                      P:            -7
MO:           72                       QRS:          66
QRSD:         125                      T:            208
QT:           315                                    
QTc:          500                                    
                           Interpretive Statements
Wide-QRS tachycardia
Nonspecific intraventricular conduction delay
Compared to ECG 2019 14:31:23
Accelerated junctional rhythm no longer present
 
Electronically Signed On 3- 17:07:21 CDT by Tacho Ortega
[email or web address]
 
 
 
 
 
 
 
 
 
 
 
 
 
 
 
 
 
 
  <ELECTRONICALLY SIGNED>
                                           By: Tacho Ortega MD, FACC   
  19     1707
D: 19 0356   _____________________________________
T: 19 0356   Tacho Ortega MD, FAC     /EPI

## 2019-03-26 NOTE — EKG
[place] [address]
Phone:  [phone]                     ELECTROCARDIOGRAM REPORT      
_______________________________________________________________________________
 
Name:       CHRISTY LR                 Room:           [address]    ADM IN  
M.R.#:  [id number]      Account #:      [id number]  
Admission:  19     Attend Phys:    ILDA Marmolejo
Discharge:               Date of Birth:  [birth year]  
         Report #: [id number]
    [id number]
_______________________________________________________________________________
THIS REPORT FOR:  //name//                      
 
                          [facility]
                                       
Test Date:    2019               Test Time:    03:52:02
Pat Name:     CHRISTY LR             Department:   
Patient ID:   [id number]            Room:         [address]
Gender:       F                        Technician:   Emilio Doe
:          1940               Requested By: Dung Chavarria
Order Number: [id number]    Isaiah MD:   Tacho Ortega
                                 Measurements
Intervals                              Axis          
Rate:         122                      P:            114
OH:           88                       QRS:          35
QRSD:         120                      T:            183
QT:           382                                    
QTc:          545                                    
                           Interpretive Statements
Tachycardia converting to accelerated junctional rhythm
Nonspecific intraventricular conduction delay
Nonspecific T abnormalities, lateral leads
Compared to ECG 2019 14:31:23
T-wave abnormality now present
 
Electronically Signed On 3- 17:07:16 CDT by Tacho Ortega
[email or web address]
 
 
 
 
 
 
 
 
 
 
 
 
 
 
 
 
 
  <ELECTRONICALLY SIGNED>
                                           By: Tacho Ortega MD, FAC   
  19     1707
D: 19 0352   _____________________________________
T: 19 0352   Tacho Ortega MD, FAC     /EPI

## 2019-03-26 NOTE — EKG
[place] [address]
Phone:  [phone]                     ELECTROCARDIOGRAM REPORT      
_______________________________________________________________________________
 
Name:       CHRISTY LR                 Room:           [address]    ADM IN  
M.R.#:  [id number]      Account #:      [id number]  
Admission:  19     Attend Phys:    ILDA Marmolejo
Discharge:               Date of Birth:  [birth year]  
         Report #: [id number]
    [id number]
_______________________________________________________________________________
THIS REPORT FOR:  //name//                      
 
                          [facility]
                                       
Test Date:    2019               Test Time:    03:51:04
Pat Name:     CHRISTY LR             Department:   
Patient ID:   [id number]            Room:         [address]
Gender:       F                        Technician:   CHEPE
:          1940               Requested By: Dung Chavarria
Order Number: [id number]    Isaiah MD:   Tacho Ortega
                                 Measurements
Intervals                              Axis          
Rate:         168                      P:            0
KS:                                    QRS:          38
QRSD:         122                      T:            164
QT:           324                                    
QTc:          542                                    
                           Interpretive Statements
Wide-QRS tachycardia
Nonspecific intraventricular conduction delay
Compared to ECG 2019 14:31:23
Accelerated junctional rhythm no longer present
 
Electronically Signed On 3- 17:06:27 CDT by Tacho Ortega
[email or web address]
 
 
 
 
 
 
 
 
 
 
 
 
 
 
 
 
 
 
  <ELECTRONICALLY SIGNED>
                                           By: Tacho Ortega MD, [facility]   
  19     1706
D: 19 0351   _____________________________________
T: 19 0351   Tacho Ortega MD, [facility]     /EPI

## 2019-03-27 VITALS — SYSTOLIC BLOOD PRESSURE: 121 MMHG | DIASTOLIC BLOOD PRESSURE: 63 MMHG

## 2019-03-27 VITALS — DIASTOLIC BLOOD PRESSURE: 56 MMHG | SYSTOLIC BLOOD PRESSURE: 110 MMHG

## 2019-03-27 VITALS — SYSTOLIC BLOOD PRESSURE: 103 MMHG | DIASTOLIC BLOOD PRESSURE: 56 MMHG

## 2019-03-27 VITALS — SYSTOLIC BLOOD PRESSURE: 116 MMHG | DIASTOLIC BLOOD PRESSURE: 54 MMHG

## 2019-03-27 VITALS — DIASTOLIC BLOOD PRESSURE: 59 MMHG | SYSTOLIC BLOOD PRESSURE: 117 MMHG

## 2019-03-27 VITALS — SYSTOLIC BLOOD PRESSURE: 105 MMHG | DIASTOLIC BLOOD PRESSURE: 58 MMHG

## 2019-03-27 VITALS — DIASTOLIC BLOOD PRESSURE: 55 MMHG | SYSTOLIC BLOOD PRESSURE: 114 MMHG

## 2019-03-27 VITALS — DIASTOLIC BLOOD PRESSURE: 54 MMHG | SYSTOLIC BLOOD PRESSURE: 101 MMHG

## 2019-03-27 VITALS — SYSTOLIC BLOOD PRESSURE: 102 MMHG | DIASTOLIC BLOOD PRESSURE: 55 MMHG

## 2019-03-27 VITALS — SYSTOLIC BLOOD PRESSURE: 126 MMHG | DIASTOLIC BLOOD PRESSURE: 64 MMHG

## 2019-03-27 VITALS — SYSTOLIC BLOOD PRESSURE: 93 MMHG | DIASTOLIC BLOOD PRESSURE: 40 MMHG

## 2019-03-27 VITALS — DIASTOLIC BLOOD PRESSURE: 51 MMHG | SYSTOLIC BLOOD PRESSURE: 102 MMHG

## 2019-03-27 VITALS — DIASTOLIC BLOOD PRESSURE: 53 MMHG | SYSTOLIC BLOOD PRESSURE: 111 MMHG

## 2019-03-27 VITALS — DIASTOLIC BLOOD PRESSURE: 53 MMHG | SYSTOLIC BLOOD PRESSURE: 103 MMHG

## 2019-03-27 VITALS — SYSTOLIC BLOOD PRESSURE: 98 MMHG | DIASTOLIC BLOOD PRESSURE: 48 MMHG

## 2019-03-27 VITALS — SYSTOLIC BLOOD PRESSURE: 99 MMHG | DIASTOLIC BLOOD PRESSURE: 51 MMHG

## 2019-03-27 VITALS — SYSTOLIC BLOOD PRESSURE: 98 MMHG | DIASTOLIC BLOOD PRESSURE: 54 MMHG

## 2019-03-27 VITALS — SYSTOLIC BLOOD PRESSURE: 104 MMHG | DIASTOLIC BLOOD PRESSURE: 56 MMHG

## 2019-03-27 VITALS — SYSTOLIC BLOOD PRESSURE: 116 MMHG | DIASTOLIC BLOOD PRESSURE: 58 MMHG

## 2019-03-27 VITALS — DIASTOLIC BLOOD PRESSURE: 51 MMHG | SYSTOLIC BLOOD PRESSURE: 117 MMHG

## 2019-03-27 VITALS — SYSTOLIC BLOOD PRESSURE: 108 MMHG | DIASTOLIC BLOOD PRESSURE: 55 MMHG

## 2019-03-27 VITALS — DIASTOLIC BLOOD PRESSURE: 55 MMHG | SYSTOLIC BLOOD PRESSURE: 109 MMHG

## 2019-03-27 VITALS — DIASTOLIC BLOOD PRESSURE: 51 MMHG | SYSTOLIC BLOOD PRESSURE: 108 MMHG

## 2019-03-27 VITALS — DIASTOLIC BLOOD PRESSURE: 73 MMHG | SYSTOLIC BLOOD PRESSURE: 127 MMHG

## 2019-03-27 VITALS — SYSTOLIC BLOOD PRESSURE: 110 MMHG | DIASTOLIC BLOOD PRESSURE: 54 MMHG

## 2019-03-27 VITALS — SYSTOLIC BLOOD PRESSURE: 105 MMHG | DIASTOLIC BLOOD PRESSURE: 56 MMHG

## 2019-03-27 VITALS — DIASTOLIC BLOOD PRESSURE: 52 MMHG | SYSTOLIC BLOOD PRESSURE: 99 MMHG

## 2019-03-27 VITALS — DIASTOLIC BLOOD PRESSURE: 57 MMHG | SYSTOLIC BLOOD PRESSURE: 107 MMHG

## 2019-03-27 VITALS — SYSTOLIC BLOOD PRESSURE: 92 MMHG | DIASTOLIC BLOOD PRESSURE: 44 MMHG

## 2019-03-27 VITALS — SYSTOLIC BLOOD PRESSURE: 105 MMHG | DIASTOLIC BLOOD PRESSURE: 53 MMHG

## 2019-03-27 VITALS — SYSTOLIC BLOOD PRESSURE: 116 MMHG | DIASTOLIC BLOOD PRESSURE: 60 MMHG

## 2019-03-27 VITALS — DIASTOLIC BLOOD PRESSURE: 65 MMHG | SYSTOLIC BLOOD PRESSURE: 117 MMHG

## 2019-03-27 VITALS — SYSTOLIC BLOOD PRESSURE: 101 MMHG | DIASTOLIC BLOOD PRESSURE: 54 MMHG

## 2019-03-27 VITALS — SYSTOLIC BLOOD PRESSURE: 108 MMHG | DIASTOLIC BLOOD PRESSURE: 57 MMHG

## 2019-03-27 VITALS — DIASTOLIC BLOOD PRESSURE: 62 MMHG | SYSTOLIC BLOOD PRESSURE: 121 MMHG

## 2019-03-27 VITALS — DIASTOLIC BLOOD PRESSURE: 53 MMHG | SYSTOLIC BLOOD PRESSURE: 115 MMHG

## 2019-03-27 VITALS — SYSTOLIC BLOOD PRESSURE: 112 MMHG | DIASTOLIC BLOOD PRESSURE: 53 MMHG

## 2019-03-27 VITALS — SYSTOLIC BLOOD PRESSURE: 119 MMHG | DIASTOLIC BLOOD PRESSURE: 62 MMHG

## 2019-03-27 VITALS — DIASTOLIC BLOOD PRESSURE: 53 MMHG | SYSTOLIC BLOOD PRESSURE: 112 MMHG

## 2019-03-27 VITALS — DIASTOLIC BLOOD PRESSURE: 51 MMHG | SYSTOLIC BLOOD PRESSURE: 98 MMHG

## 2019-03-27 VITALS — DIASTOLIC BLOOD PRESSURE: 53 MMHG | SYSTOLIC BLOOD PRESSURE: 101 MMHG

## 2019-03-27 VITALS — DIASTOLIC BLOOD PRESSURE: 57 MMHG | SYSTOLIC BLOOD PRESSURE: 111 MMHG

## 2019-03-27 VITALS — DIASTOLIC BLOOD PRESSURE: 55 MMHG | SYSTOLIC BLOOD PRESSURE: 105 MMHG

## 2019-03-27 VITALS — SYSTOLIC BLOOD PRESSURE: 120 MMHG | DIASTOLIC BLOOD PRESSURE: 60 MMHG

## 2019-03-27 VITALS — DIASTOLIC BLOOD PRESSURE: 65 MMHG | SYSTOLIC BLOOD PRESSURE: 124 MMHG

## 2019-03-27 VITALS — SYSTOLIC BLOOD PRESSURE: 113 MMHG | DIASTOLIC BLOOD PRESSURE: 55 MMHG

## 2019-03-27 VITALS — DIASTOLIC BLOOD PRESSURE: 56 MMHG | SYSTOLIC BLOOD PRESSURE: 102 MMHG

## 2019-03-27 VITALS — DIASTOLIC BLOOD PRESSURE: 63 MMHG | SYSTOLIC BLOOD PRESSURE: 121 MMHG

## 2019-03-27 VITALS — SYSTOLIC BLOOD PRESSURE: 111 MMHG | DIASTOLIC BLOOD PRESSURE: 55 MMHG

## 2019-03-27 VITALS — DIASTOLIC BLOOD PRESSURE: 52 MMHG | SYSTOLIC BLOOD PRESSURE: 108 MMHG

## 2019-03-27 VITALS — SYSTOLIC BLOOD PRESSURE: 112 MMHG | DIASTOLIC BLOOD PRESSURE: 60 MMHG

## 2019-03-27 VITALS — DIASTOLIC BLOOD PRESSURE: 53 MMHG | SYSTOLIC BLOOD PRESSURE: 105 MMHG

## 2019-03-27 VITALS — SYSTOLIC BLOOD PRESSURE: 114 MMHG | DIASTOLIC BLOOD PRESSURE: 59 MMHG

## 2019-03-27 VITALS — SYSTOLIC BLOOD PRESSURE: 102 MMHG | DIASTOLIC BLOOD PRESSURE: 50 MMHG

## 2019-03-27 VITALS — SYSTOLIC BLOOD PRESSURE: 100 MMHG | DIASTOLIC BLOOD PRESSURE: 53 MMHG

## 2019-03-27 VITALS — DIASTOLIC BLOOD PRESSURE: 61 MMHG | SYSTOLIC BLOOD PRESSURE: 111 MMHG

## 2019-03-27 VITALS — SYSTOLIC BLOOD PRESSURE: 119 MMHG | DIASTOLIC BLOOD PRESSURE: 52 MMHG

## 2019-03-27 VITALS — SYSTOLIC BLOOD PRESSURE: 107 MMHG | DIASTOLIC BLOOD PRESSURE: 56 MMHG

## 2019-03-27 VITALS — SYSTOLIC BLOOD PRESSURE: 105 MMHG | DIASTOLIC BLOOD PRESSURE: 49 MMHG

## 2019-03-27 VITALS — SYSTOLIC BLOOD PRESSURE: 101 MMHG | DIASTOLIC BLOOD PRESSURE: 46 MMHG

## 2019-03-27 VITALS — DIASTOLIC BLOOD PRESSURE: 63 MMHG | SYSTOLIC BLOOD PRESSURE: 116 MMHG

## 2019-03-27 VITALS — DIASTOLIC BLOOD PRESSURE: 59 MMHG | SYSTOLIC BLOOD PRESSURE: 116 MMHG

## 2019-03-27 VITALS — SYSTOLIC BLOOD PRESSURE: 103 MMHG | DIASTOLIC BLOOD PRESSURE: 54 MMHG

## 2019-03-27 LAB
ABSOLUTE BASOPHILS: 0.9 THOU/UL (ref 0–0.2)
ABSOLUTE EOSINOPHILS: 0.5 THOU/UL (ref 0–0.7)
ABSOLUTE MONOCYTES: 2.8 THOU/UL (ref 0–1.2)
ALBUMIN SERPL-MCNC: 2.5 G/DL (ref 3.4–5)
ALP SERPL-CCNC: 52 U/L (ref 46–116)
ALT SERPL-CCNC: 16 U/L (ref 30–65)
ANION GAP SERPL CALC-SCNC: 12 MMOL/L (ref 7–16)
AST SERPL-CCNC: 26 U/L (ref 15–37)
BASOPHILS NFR BLD AUTO: 2 %
BE: -4.1 MMOL/L
BILIRUB SERPL-MCNC: 1.1 MG/DL
BUN SERPL-MCNC: 55 MG/DL (ref 7–18)
CALCIUM SERPL-MCNC: 7.3 MG/DL (ref 8.5–10.1)
CHLORIDE SERPL-SCNC: 108 MMOL/L (ref 98–107)
CO2 SERPL-SCNC: 22 MMOL/L (ref 21–32)
CREAT SERPL-MCNC: 1.4 MG/DL (ref 0.6–1.3)
EOSINOPHIL NFR BLD: 1 %
GLUCOSE SERPL-MCNC: 204 MG/DL (ref 70–99)
GRANULOCYTES NFR BLD MANUAL: 75 %
HCT VFR BLD CALC: 23.4 % (ref 37–47)
HGB BLD-MCNC: 7.4 GM/DL (ref 12–15)
HGB BLD-MCNC: 7.5 G/DL (ref 11.1–15.9)
LIPASE: 41 U/L (ref 73–393)
LYMPHOCYTES # BLD: 5.6 THOU/UL (ref 0.8–5.3)
LYMPHOCYTES NFR BLD AUTO: 9 %
MAGNESIUM SERPL-MCNC: 2 MG/DL (ref 1.8–2.4)
MCH RBC QN AUTO: 24.9 PG (ref 26–34)
MCHC RBC AUTO-ENTMCNC: 31.8 G/DL (ref 28–37)
MCV RBC: 78.4 FL (ref 80–100)
MONOCYTES NFR BLD: 6 %
MPV: 8.4 FL. (ref 7.2–11.1)
NEUTROPHILS # BLD: 37.1 THOU/UL (ref 1.6–8.1)
NEUTS BAND NFR BLD: 4 %
NUCLEATED RBCS: 2 /100WBC
PCO2 BLD: 32.8 MMHG (ref 35–45)
PHOSPHATE SERPL-MCNC: 4 MG/DL (ref 2.5–4.9)
PLATELET COUNT*: 121 THOU/UL (ref 150–400)
PO2 BLD: 118.2 MMHG (ref 75–100)
POTASSIUM SERPL-SCNC: 3.5 MMOL/L (ref 3.5–5.1)
PROT SERPL-MCNC: 5.4 G/DL (ref 6.4–8.2)
RBC # BLD AUTO: 2.99 MIL/UL (ref 4.2–5)
RDW-CV: 21.5 % (ref 10.5–14.5)
SODIUM SERPL-SCNC: 142 MMOL/L (ref 136–145)
VARIANT LYMPHS NFR BLD MANUAL: 3 %
WBC # BLD AUTO: 47 THOU/UL (ref 4–11)

## 2019-03-27 NOTE — CON
[address]  [address]                    CONSULTATION                  
_______________________________________________________________________________
 
Name:       CHRISTY LR                 Room:           [address] [place]  
.R.#:  [id number]      Account #:      [id number]  
Admission:  03/25/19     Attend Phys:    ILDA Marmolejo
Discharge:               Date of Birth:  [birth year]  
         Report #: [id number]
                                                                     [id number]  
_______________________________________________________________________________
THIS REPORT FOR:  //name//                      
 
CC: Janice Chavarria
 
DATE OF SERVICE:  03/26/2019
 
 
ATTENDING PHYSICIAN:  Dr. Chavarria.
 
REASON FOR EVALUATION:  Septic shock, complicated by multiorgan dysfunction in
the setting of leukemia as well as cardiomyopathy.
 
HISTORY OF PRESENT ILLNESS:  Chart reviewed, patient examined.  This is a
78-year-old apparently with diagnosis of leukemia, although it is otherwise
undefined per the records who presents to the Emergency Room with complaints of
significant lower gastrointestinal bleeding, was found to have a hemoglobin of
3.9.  The patient was found out to have evidence of hemodynamic instability, did
require emergent intubation, now is on mechanical ventilatory support.  She is
on pressor support as well due to hypotension.  Additional evaluation noted
evidence of a right-sided infiltrate on chest x-ray, CT confirmation.  She has
marked hepatosplenomegaly.  She has evidence of severe cardiomyopathy and severe
tricuspid regurgitation due to pulmonary arterial hypertension.  She concerns
about an infectious component.  Cultures have been collected on the blood and
empirically started on antimicrobial therapy, cefepime, metronidazole and
vancomycin.
 
ALLERGIES:  LISTED TO PENICILLIN, SULFA, CODEINE.
 
CURRENT MEDICATIONS:  Include amiodarone, levofloxacin, cefepime, midazolam,
octreotide, pantoprazole, levothyroxine.
 
PAST MEDICAL HISTORY:  History of leukemia of unclear type at this point,
cardiomyopathy, prolapsed uterus and bladder, hypothyroidism.
 
SOCIAL HISTORY:  Nonsmoker, no ethanol, no illicit drug use.
 
FAMILY HISTORY:  Noncontributory.
 
REVIEW OF SYSTEMS:  Not obtainable.
 
PHYSICAL EXAMINATION:
GENERAL:  She appears chronically ill, appears pale.  She is supine maintained,
intubated on mechanical ventilatory support.
HEENT:  Normocephalic.
NECK:  Supple.
 
 
 
[place] [address]                    CONSULTATION                  
_______________________________________________________________________________
 
Name:       CHRISTY LR                 Room:           [address]#:  [id number]      Account #:      [id number]  
Admission:  03/25/19     Attend Phys:    ILDA Marmolejo
Discharge:               Date of Birth:  [birth year]  
         Report #: [id number]
                                                                     [id number]  
_______________________________________________________________________________
VITAL SIGNS:  Temperature 98.8, pulse 108, respirations 18, blood pressure
108/61.
SKIN:  Warm.  She is cool to touch at the distal extremities, otherwise.
LUNGS:  Scattered coarse breath sounds.
HEART:  Tachycardic, regular.  Does have what appears to be systolic murmur.
ABDOMEN:  Soft, although she is distended.  There are not any overt peritoneal
signs.
GENITOURINARY AND RECTAL:  Deferred.
 
LABORATORY DATA:  Blood cultures sterile thus far.  Most recent CBC:  White
count of 78.2, hemoglobin 8.5 that is up from 3.9 post-transfusion, hematocrit
27.5, platelet count of 232.  Differential shows primarily neutrophils,
previously had a left shift, 8% bands, metamyelocytes 3%, myelocytes 2%,
promyelocytes 2%, blasts 1%, suggestive of a myeloproliferative disorder.  Chest
x-ray as noted above, bilateral lower lobe atelectasis, infiltrate, greater on
the right.  Electrolytes:  Sodium 140, potassium 6.1, chloride 109, bicarbonate
is 17, anion gap of 14, BUN and creatinine 61 and 1.6.  ABGs earlier today, pH
7.306, pCO2 of 35.2, pO2 of 119, FiO2 of 50%.  Albumin 2.2, total protein 5.1. 
LFTs unremarkable.  Estimated GFR of 36.  Prealbumin 9.7.  Echo showed EF of
30-35%, moderate mitral regurgitation, severe tricuspid regurgitation, estimated
PA pressure of 60 mmHg, mild pericardial effusion.  Lactic acid of 4.1.
 
ASSESSMENT:  Septic shock in the setting of leukemia.  It is difficult to
ascertain whether this is prodromal to some sort of a crisis.  We will continue
broad-spectrum antimicrobial therapy at this point.  The etiology is unclear. 
She may well have pneumonitis, although this could certainly be more volume
expansion and excess fluid.  She is certainly critically ill and perhaps would
expect her to have deterioration before she improves.  Await recommendations
from other consultants.  We will await results of the pending studies.  Overall,
prognosis appears quite guarded.
 
 
 
 
 
 
 
 
 
 
 
 
 
 
<ELECTRONICALLY SIGNED>
                                        By:  William Cramer MD           
03/27/19     1319
D: 03/26/19 1001_______________________________________
T: 03/26/19 2334Jocoleen Cramer MD              /nt

## 2019-03-27 NOTE — EKG
[place] [address]
Phone:  [phone]                     ELECTROCARDIOGRAM REPORT      
_______________________________________________________________________________
 
Name:       CHRISTY LR                 Room:           [address]    ADM IN  
M.R.#:  [id number]      Account #:      [id number]  
Admission:  19     Attend Phys:    ILDA Marmolejo
Discharge:               Date of Birth:  [birth year]  
         Report #: [id number]
    [id number]
_______________________________________________________________________________
THIS REPORT FOR:  //name//                      
 
                          [facility]
                                       
Test Date:    2019               Test Time:    08:13:50
Pat Name:     CHRISTY LR             Department:   
Patient ID:   [id number]            Room:         [facility]
Gender:       F                        Technician:   
:          1940               Requested By: Linda Swanson
Order Number: [id number]    Reading MD:   Paul Wilson
                                 Measurements
Intervals                              Axis          
Rate:         71                       P:            57
AL:           406                      QRS:          93
QRSD:         122                      T:            -86
QT:           507                                    
QTc:          552                                    
                           Interpretive Statements
Sinus rhythm
Prolonged AL interval
poor r wave progression
Abnormal T, consider ischemia, lateral leads
Prolonged QT interval
Compared to ECG 2019 03:56:28
 
Left ventricular hypertrophy now present
Prolonged QT interval now present
supraventricular tachycardia no longer seen
 
Electronically Signed On 3- 15:34:27 CDT by Paul Wilson
[email or web address]
 
 
 
 
 
 
 
 
 
 
 
 
  <ELECTRONICALLY SIGNED>
                                           By: Paul Wilson MD, FACC      
  19     1534
D: 19   _____________________________________
T: 19   Paul Wilson MD, FAC        /EPI

## 2019-03-28 VITALS — DIASTOLIC BLOOD PRESSURE: 61 MMHG | SYSTOLIC BLOOD PRESSURE: 127 MMHG

## 2019-03-28 VITALS — SYSTOLIC BLOOD PRESSURE: 117 MMHG | DIASTOLIC BLOOD PRESSURE: 61 MMHG

## 2019-03-28 VITALS — DIASTOLIC BLOOD PRESSURE: 54 MMHG | SYSTOLIC BLOOD PRESSURE: 99 MMHG

## 2019-03-28 VITALS — SYSTOLIC BLOOD PRESSURE: 124 MMHG | DIASTOLIC BLOOD PRESSURE: 62 MMHG

## 2019-03-28 VITALS — DIASTOLIC BLOOD PRESSURE: 63 MMHG | SYSTOLIC BLOOD PRESSURE: 125 MMHG

## 2019-03-28 VITALS — SYSTOLIC BLOOD PRESSURE: 118 MMHG | DIASTOLIC BLOOD PRESSURE: 55 MMHG

## 2019-03-28 VITALS — SYSTOLIC BLOOD PRESSURE: 121 MMHG | DIASTOLIC BLOOD PRESSURE: 67 MMHG

## 2019-03-28 VITALS — SYSTOLIC BLOOD PRESSURE: 121 MMHG | DIASTOLIC BLOOD PRESSURE: 65 MMHG

## 2019-03-28 VITALS — SYSTOLIC BLOOD PRESSURE: 132 MMHG | DIASTOLIC BLOOD PRESSURE: 62 MMHG

## 2019-03-28 VITALS — DIASTOLIC BLOOD PRESSURE: 59 MMHG | SYSTOLIC BLOOD PRESSURE: 109 MMHG

## 2019-03-28 VITALS — DIASTOLIC BLOOD PRESSURE: 56 MMHG | SYSTOLIC BLOOD PRESSURE: 112 MMHG

## 2019-03-28 VITALS — DIASTOLIC BLOOD PRESSURE: 64 MMHG | SYSTOLIC BLOOD PRESSURE: 116 MMHG

## 2019-03-28 VITALS — DIASTOLIC BLOOD PRESSURE: 65 MMHG | SYSTOLIC BLOOD PRESSURE: 118 MMHG

## 2019-03-28 VITALS — DIASTOLIC BLOOD PRESSURE: 60 MMHG | SYSTOLIC BLOOD PRESSURE: 111 MMHG

## 2019-03-28 VITALS — DIASTOLIC BLOOD PRESSURE: 75 MMHG | SYSTOLIC BLOOD PRESSURE: 130 MMHG

## 2019-03-28 VITALS — DIASTOLIC BLOOD PRESSURE: 47 MMHG | SYSTOLIC BLOOD PRESSURE: 89 MMHG

## 2019-03-28 VITALS — DIASTOLIC BLOOD PRESSURE: 43 MMHG | SYSTOLIC BLOOD PRESSURE: 97 MMHG

## 2019-03-28 VITALS — DIASTOLIC BLOOD PRESSURE: 54 MMHG | SYSTOLIC BLOOD PRESSURE: 110 MMHG

## 2019-03-28 VITALS — DIASTOLIC BLOOD PRESSURE: 61 MMHG | SYSTOLIC BLOOD PRESSURE: 121 MMHG

## 2019-03-28 VITALS — SYSTOLIC BLOOD PRESSURE: 123 MMHG | DIASTOLIC BLOOD PRESSURE: 63 MMHG

## 2019-03-28 VITALS — SYSTOLIC BLOOD PRESSURE: 130 MMHG | DIASTOLIC BLOOD PRESSURE: 67 MMHG

## 2019-03-28 VITALS — DIASTOLIC BLOOD PRESSURE: 66 MMHG | SYSTOLIC BLOOD PRESSURE: 114 MMHG

## 2019-03-28 VITALS — DIASTOLIC BLOOD PRESSURE: 64 MMHG | SYSTOLIC BLOOD PRESSURE: 117 MMHG

## 2019-03-28 VITALS — SYSTOLIC BLOOD PRESSURE: 92 MMHG | DIASTOLIC BLOOD PRESSURE: 37 MMHG

## 2019-03-28 VITALS — SYSTOLIC BLOOD PRESSURE: 101 MMHG | DIASTOLIC BLOOD PRESSURE: 51 MMHG

## 2019-03-28 VITALS — DIASTOLIC BLOOD PRESSURE: 65 MMHG | SYSTOLIC BLOOD PRESSURE: 121 MMHG

## 2019-03-28 VITALS — SYSTOLIC BLOOD PRESSURE: 79 MMHG | DIASTOLIC BLOOD PRESSURE: 38 MMHG

## 2019-03-28 VITALS — SYSTOLIC BLOOD PRESSURE: 111 MMHG | DIASTOLIC BLOOD PRESSURE: 61 MMHG

## 2019-03-28 VITALS — DIASTOLIC BLOOD PRESSURE: 51 MMHG | SYSTOLIC BLOOD PRESSURE: 101 MMHG

## 2019-03-28 VITALS — DIASTOLIC BLOOD PRESSURE: 44 MMHG | SYSTOLIC BLOOD PRESSURE: 89 MMHG

## 2019-03-28 VITALS — SYSTOLIC BLOOD PRESSURE: 90 MMHG | DIASTOLIC BLOOD PRESSURE: 42 MMHG

## 2019-03-28 VITALS — DIASTOLIC BLOOD PRESSURE: 40 MMHG | SYSTOLIC BLOOD PRESSURE: 81 MMHG

## 2019-03-28 VITALS — DIASTOLIC BLOOD PRESSURE: 61 MMHG | SYSTOLIC BLOOD PRESSURE: 113 MMHG

## 2019-03-28 LAB
%HYPO/RBC NFR BLD AUTO: (no result) %
ABSOLUTE BASOPHILS: 0.5 THOU/UL (ref 0–0.2)
ABSOLUTE EOSINOPHILS: 0.5 THOU/UL (ref 0–0.7)
ABSOLUTE MONOCYTES: 2.2 THOU/UL (ref 0–1.2)
ALBUMIN SERPL-MCNC: 2.6 G/DL (ref 3.4–5)
ALP SERPL-CCNC: 45 U/L (ref 46–116)
ALT SERPL-CCNC: 15 U/L (ref 30–65)
ANION GAP SERPL CALC-SCNC: 12 MMOL/L (ref 7–16)
ANISOCYTOSIS BLD QL SMEAR: (no result)
AST SERPL-CCNC: 24 U/L (ref 15–37)
ATYPICAL MONONUCLEARS: 1 %
BASO STIPL BLD QL SMEAR: (no result)
BASOPHILS NFR BLD AUTO: 2 %
BE: -3.4 MMOL/L
BILIRUB SERPL-MCNC: 1.1 MG/DL
BUN SERPL-MCNC: 41 MG/DL (ref 7–18)
BURR CELLS BLD QL SMEAR: (no result)
CALCIUM SERPL-MCNC: 7.5 MG/DL (ref 8.5–10.1)
CHLORIDE SERPL-SCNC: 109 MMOL/L (ref 98–107)
CO2 SERPL-SCNC: 23 MMOL/L (ref 21–32)
CREAT SERPL-MCNC: 1.2 MG/DL (ref 0.6–1.3)
DOHLE BOD BLD QL SMEAR: (no result)
EOSINOPHIL NFR BLD: 2 %
GLUCOSE SERPL-MCNC: 151 MG/DL (ref 70–99)
GRANULOCYTES NFR BLD MANUAL: 69 %
HCT VFR BLD CALC: 22.1 % (ref 37–47)
HCT VFR BLD CALC: 27.9 % (ref 37–47)
HGB BLD-MCNC: 7.2 GM/DL (ref 12–15)
HGB BLD-MCNC: 9.3 GM/DL (ref 12–15)
INR PPP: 1.3
LYMPHOCYTES # BLD: 2.7 THOU/UL (ref 0.8–5.3)
LYMPHOCYTES NFR BLD AUTO: 9 %
MAGNESIUM SERPL-MCNC: 1.9 MG/DL (ref 1.8–2.4)
MAGNESIUM SERPL-MCNC: 2.5 MG/DL (ref 1.8–2.4)
MCH RBC QN AUTO: 25.7 PG (ref 26–34)
MCHC RBC AUTO-ENTMCNC: 32.5 G/DL (ref 28–37)
MCV RBC: 79 FL (ref 80–100)
METAMYELOCYTES NFR BLD: 3 %
MICROCYTES: (no result)
MONOCYTES NFR BLD: 7 %
MPV: 8.2 FL. (ref 7.2–11.1)
MYELOCYTES NFR BLD: 3 %
NEUTROPHILS # BLD: 21.1 THOU/UL (ref 1.6–8.1)
NEUTS BAND NFR BLD: 3 %
NUCLEATED RBCS: 1 /100WBC
OVALOCYTES BLD QL SMEAR: (no result)
PCO2 BLD: 34.8 MMHG (ref 35–45)
PLATELET # BLD EST: (no result) 10*3/UL
PLATELET COUNT*: 75 THOU/UL (ref 150–400)
PO2 BLD: 101.7 MMHG (ref 75–100)
POIKILOCYTOSIS BLD QL SMEAR: (no result)
POLYCHROMASIA BLD QL SMEAR: (no result)
POTASSIUM SERPL-SCNC: 3.4 MMOL/L (ref 3.5–5.1)
POTASSIUM SERPL-SCNC: 4.1 MMOL/L (ref 3.5–5.1)
PROT SERPL-MCNC: 5.3 G/DL (ref 6.4–8.2)
PROTHROMBIN TIME: 12.8 SECONDS (ref 9.2–11.5)
RBC # BLD AUTO: 2.8 MIL/UL (ref 4.2–5)
RDW-CV: 21.1 % (ref 10.5–14.5)
SCHISTOCYTES BLD QL SMEAR: (no result)
SODIUM SERPL-SCNC: 144 MMOL/L (ref 136–145)
TEARDROPS: (no result)
TOXIC GRANULES BLD QL SMEAR: (no result)
VARIANT LYMPHS NFR BLD MANUAL: 1 %
WBC # BLD AUTO: 27.1 THOU/UL (ref 4–11)

## 2019-03-28 PROCEDURE — 0DBN8ZZ EXCISION OF SIGMOID COLON, VIA NATURAL OR ARTIFICIAL OPENING ENDOSCOPIC: ICD-10-PCS | Performed by: INTERNAL MEDICINE

## 2019-03-28 NOTE — EKG
[place] [address]
Phone:  [phone]                     ELECTROCARDIOGRAM REPORT      
_______________________________________________________________________________
 
Name:       CHRISTY LR                 Room:           [address]    ADM IN  
M.R.#:  [id number]      Account #:      [id number]  
Admission:  19     Attend Phys:    ILDA Marmolejo
Discharge:               Date of Birth:  [birth year]  
         Report #: [id number]
    [id number]
_______________________________________________________________________________
THIS REPORT FOR:  //name//                      
 
                          [facility]
                                       
Test Date:    2019               Test Time:    08:00:21
Pat Name:     CHRISTY LR             Department:   
Patient ID:   [id number]            Room:         [facility]
Gender:       F                        Technician:   
:          1940               Requested By: Linda Swanson
Order Number: [id number]    Isaiah MD:   Paul Wilson
                                 Measurements
Intervals                              Axis          
Rate:         72                       P:            0
IN:           421                      QRS:          109
QRSD:         127                      T:            
QT:           475                                    
QTc:          520                                    
                           Interpretive Statements
Sinus rhythm
 
Consider left ventricular hypertrophy
Borderline abnrm T, anterolateral leads
Prolonged QT interval
Compared to ECG 2019 08:13:50
no change
Electronically Signed On 3- 13:13:27 CDT by Paul Wilson
[email or web address]
 
 
 
 
 
 
 
 
 
 
 
 
 
 
 
 
  <ELECTRONICALLY SIGNED>
                                           By: Paul Wilson MD, [facility]      
  19     1313
D: 19 08   _____________________________________
T: 19 08   Paul Wilson MD, [facility]        /EPI

## 2019-03-29 VITALS — DIASTOLIC BLOOD PRESSURE: 68 MMHG | SYSTOLIC BLOOD PRESSURE: 125 MMHG

## 2019-03-29 VITALS — DIASTOLIC BLOOD PRESSURE: 58 MMHG | SYSTOLIC BLOOD PRESSURE: 114 MMHG

## 2019-03-29 VITALS — DIASTOLIC BLOOD PRESSURE: 41 MMHG | SYSTOLIC BLOOD PRESSURE: 84 MMHG

## 2019-03-29 VITALS — SYSTOLIC BLOOD PRESSURE: 90 MMHG | DIASTOLIC BLOOD PRESSURE: 47 MMHG

## 2019-03-29 VITALS — SYSTOLIC BLOOD PRESSURE: 78 MMHG | DIASTOLIC BLOOD PRESSURE: 38 MMHG

## 2019-03-29 VITALS — DIASTOLIC BLOOD PRESSURE: 77 MMHG | SYSTOLIC BLOOD PRESSURE: 134 MMHG

## 2019-03-29 VITALS — DIASTOLIC BLOOD PRESSURE: 67 MMHG | SYSTOLIC BLOOD PRESSURE: 126 MMHG

## 2019-03-29 VITALS — DIASTOLIC BLOOD PRESSURE: 62 MMHG | SYSTOLIC BLOOD PRESSURE: 126 MMHG

## 2019-03-29 VITALS — DIASTOLIC BLOOD PRESSURE: 60 MMHG | SYSTOLIC BLOOD PRESSURE: 130 MMHG

## 2019-03-29 VITALS — DIASTOLIC BLOOD PRESSURE: 60 MMHG | SYSTOLIC BLOOD PRESSURE: 113 MMHG

## 2019-03-29 VITALS — SYSTOLIC BLOOD PRESSURE: 112 MMHG | DIASTOLIC BLOOD PRESSURE: 54 MMHG

## 2019-03-29 VITALS — SYSTOLIC BLOOD PRESSURE: 117 MMHG | DIASTOLIC BLOOD PRESSURE: 68 MMHG

## 2019-03-29 VITALS — DIASTOLIC BLOOD PRESSURE: 60 MMHG | SYSTOLIC BLOOD PRESSURE: 120 MMHG

## 2019-03-29 VITALS — DIASTOLIC BLOOD PRESSURE: 43 MMHG | SYSTOLIC BLOOD PRESSURE: 118 MMHG

## 2019-03-29 VITALS — SYSTOLIC BLOOD PRESSURE: 115 MMHG | DIASTOLIC BLOOD PRESSURE: 57 MMHG

## 2019-03-29 VITALS — DIASTOLIC BLOOD PRESSURE: 72 MMHG | SYSTOLIC BLOOD PRESSURE: 123 MMHG

## 2019-03-29 VITALS — DIASTOLIC BLOOD PRESSURE: 61 MMHG | SYSTOLIC BLOOD PRESSURE: 122 MMHG

## 2019-03-29 VITALS — DIASTOLIC BLOOD PRESSURE: 66 MMHG | SYSTOLIC BLOOD PRESSURE: 120 MMHG

## 2019-03-29 VITALS — SYSTOLIC BLOOD PRESSURE: 94 MMHG | DIASTOLIC BLOOD PRESSURE: 43 MMHG

## 2019-03-29 VITALS — DIASTOLIC BLOOD PRESSURE: 65 MMHG | SYSTOLIC BLOOD PRESSURE: 124 MMHG

## 2019-03-29 VITALS — DIASTOLIC BLOOD PRESSURE: 60 MMHG | SYSTOLIC BLOOD PRESSURE: 118 MMHG

## 2019-03-29 VITALS — SYSTOLIC BLOOD PRESSURE: 114 MMHG | DIASTOLIC BLOOD PRESSURE: 60 MMHG

## 2019-03-29 VITALS — SYSTOLIC BLOOD PRESSURE: 124 MMHG | DIASTOLIC BLOOD PRESSURE: 58 MMHG

## 2019-03-29 VITALS — SYSTOLIC BLOOD PRESSURE: 128 MMHG | DIASTOLIC BLOOD PRESSURE: 63 MMHG

## 2019-03-29 VITALS — SYSTOLIC BLOOD PRESSURE: 114 MMHG | DIASTOLIC BLOOD PRESSURE: 66 MMHG

## 2019-03-29 VITALS — SYSTOLIC BLOOD PRESSURE: 119 MMHG | DIASTOLIC BLOOD PRESSURE: 66 MMHG

## 2019-03-29 VITALS — SYSTOLIC BLOOD PRESSURE: 111 MMHG | DIASTOLIC BLOOD PRESSURE: 54 MMHG

## 2019-03-29 VITALS — SYSTOLIC BLOOD PRESSURE: 122 MMHG | DIASTOLIC BLOOD PRESSURE: 64 MMHG

## 2019-03-29 VITALS — DIASTOLIC BLOOD PRESSURE: 66 MMHG | SYSTOLIC BLOOD PRESSURE: 133 MMHG

## 2019-03-29 VITALS — SYSTOLIC BLOOD PRESSURE: 123 MMHG | DIASTOLIC BLOOD PRESSURE: 62 MMHG

## 2019-03-29 VITALS — DIASTOLIC BLOOD PRESSURE: 43 MMHG | SYSTOLIC BLOOD PRESSURE: 84 MMHG

## 2019-03-29 VITALS — SYSTOLIC BLOOD PRESSURE: 116 MMHG | DIASTOLIC BLOOD PRESSURE: 56 MMHG

## 2019-03-29 VITALS — SYSTOLIC BLOOD PRESSURE: 106 MMHG | DIASTOLIC BLOOD PRESSURE: 52 MMHG

## 2019-03-29 VITALS — DIASTOLIC BLOOD PRESSURE: 61 MMHG | SYSTOLIC BLOOD PRESSURE: 125 MMHG

## 2019-03-29 VITALS — SYSTOLIC BLOOD PRESSURE: 120 MMHG | DIASTOLIC BLOOD PRESSURE: 60 MMHG

## 2019-03-29 VITALS — SYSTOLIC BLOOD PRESSURE: 125 MMHG | DIASTOLIC BLOOD PRESSURE: 64 MMHG

## 2019-03-29 VITALS — SYSTOLIC BLOOD PRESSURE: 95 MMHG | DIASTOLIC BLOOD PRESSURE: 46 MMHG

## 2019-03-29 VITALS — DIASTOLIC BLOOD PRESSURE: 63 MMHG | SYSTOLIC BLOOD PRESSURE: 135 MMHG

## 2019-03-29 LAB
ABSOLUTE BASOPHILS: 0.5 THOU/UL (ref 0–0.2)
ABSOLUTE EOSINOPHILS: 0.3 THOU/UL (ref 0–0.7)
ABSOLUTE MONOCYTES: 1.5 THOU/UL (ref 0–1.2)
ALBUMIN SERPL-MCNC: 2.8 G/DL (ref 3.4–5)
ALP SERPL-CCNC: 42 U/L (ref 46–116)
ALT SERPL-CCNC: 13 U/L (ref 30–65)
ANION GAP SERPL CALC-SCNC: 10 MMOL/L (ref 7–16)
AST SERPL-CCNC: 19 U/L (ref 15–37)
BASOPHILS NFR BLD AUTO: 1.2 %
BE: -1.6 MMOL/L
BILIRUB SERPL-MCNC: 0.8 MG/DL
BUN SERPL-MCNC: 39 MG/DL (ref 7–18)
CALCIUM SERPL-MCNC: 8.2 MG/DL (ref 8.5–10.1)
CHLORIDE SERPL-SCNC: 111 MMOL/L (ref 98–107)
CO2 SERPL-SCNC: 25 MMOL/L (ref 21–32)
CREAT SERPL-MCNC: 1 MG/DL (ref 0.6–1.3)
EOSINOPHIL NFR BLD: 0.7 %
GLUCOSE SERPL-MCNC: 191 MG/DL (ref 70–99)
GRANULOCYTES NFR BLD MANUAL: 86 %
HCT VFR BLD CALC: 25.2 % (ref 37–47)
HGB BLD-MCNC: 8.2 GM/DL (ref 12–15)
LYMPHOCYTES # BLD: 3.3 THOU/UL (ref 0.8–5.3)
LYMPHOCYTES NFR BLD AUTO: 8.3 %
MAGNESIUM SERPL-MCNC: 2.2 MG/DL (ref 1.8–2.4)
MCH RBC QN AUTO: 26.7 PG (ref 26–34)
MCHC RBC AUTO-ENTMCNC: 32.6 G/DL (ref 28–37)
MCV RBC: 82 FL (ref 80–100)
MONOCYTES NFR BLD: 3.8 %
MPV: 8.4 FL. (ref 7.2–11.1)
NEUTROPHILS # BLD: 33.9 THOU/UL (ref 1.6–8.1)
NUCLEATED RBCS: 1 /100WBC
PCO2 BLD: 41.1 MMHG (ref 35–45)
PLATELET COUNT*: 65 THOU/UL (ref 150–400)
PO2 BLD: 100.1 MMHG (ref 75–100)
POTASSIUM SERPL-SCNC: 3.7 MMOL/L (ref 3.5–5.1)
PROT SERPL-MCNC: 5.6 G/DL (ref 6.4–8.2)
RBC # BLD AUTO: 3.08 MIL/UL (ref 4.2–5)
RDW-CV: 22 % (ref 10.5–14.5)
SODIUM SERPL-SCNC: 146 MMOL/L (ref 136–145)
WBC # BLD AUTO: 39.5 THOU/UL (ref 4–11)

## 2019-03-30 VITALS — SYSTOLIC BLOOD PRESSURE: 108 MMHG | DIASTOLIC BLOOD PRESSURE: 62 MMHG

## 2019-03-30 VITALS — SYSTOLIC BLOOD PRESSURE: 99 MMHG | DIASTOLIC BLOOD PRESSURE: 44 MMHG

## 2019-03-30 VITALS — DIASTOLIC BLOOD PRESSURE: 60 MMHG | SYSTOLIC BLOOD PRESSURE: 119 MMHG

## 2019-03-30 VITALS — DIASTOLIC BLOOD PRESSURE: 64 MMHG | SYSTOLIC BLOOD PRESSURE: 1242 MMHG

## 2019-03-30 VITALS — DIASTOLIC BLOOD PRESSURE: 64 MMHG | SYSTOLIC BLOOD PRESSURE: 116 MMHG

## 2019-03-30 VITALS — SYSTOLIC BLOOD PRESSURE: 118 MMHG | DIASTOLIC BLOOD PRESSURE: 61 MMHG

## 2019-03-30 VITALS — DIASTOLIC BLOOD PRESSURE: 54 MMHG | SYSTOLIC BLOOD PRESSURE: 103 MMHG

## 2019-03-30 VITALS — DIASTOLIC BLOOD PRESSURE: 41 MMHG | SYSTOLIC BLOOD PRESSURE: 98 MMHG

## 2019-03-30 VITALS — DIASTOLIC BLOOD PRESSURE: 45 MMHG | SYSTOLIC BLOOD PRESSURE: 113 MMHG

## 2019-03-30 VITALS — DIASTOLIC BLOOD PRESSURE: 64 MMHG | SYSTOLIC BLOOD PRESSURE: 103 MMHG

## 2019-03-30 VITALS — DIASTOLIC BLOOD PRESSURE: 50 MMHG | SYSTOLIC BLOOD PRESSURE: 117 MMHG

## 2019-03-30 VITALS — SYSTOLIC BLOOD PRESSURE: 118 MMHG | DIASTOLIC BLOOD PRESSURE: 55 MMHG

## 2019-03-30 VITALS — DIASTOLIC BLOOD PRESSURE: 45 MMHG | SYSTOLIC BLOOD PRESSURE: 98 MMHG

## 2019-03-30 VITALS — SYSTOLIC BLOOD PRESSURE: 120 MMHG | DIASTOLIC BLOOD PRESSURE: 81 MMHG

## 2019-03-30 VITALS — DIASTOLIC BLOOD PRESSURE: 50 MMHG | SYSTOLIC BLOOD PRESSURE: 88 MMHG

## 2019-03-30 VITALS — SYSTOLIC BLOOD PRESSURE: 124 MMHG | DIASTOLIC BLOOD PRESSURE: 62 MMHG

## 2019-03-30 VITALS — SYSTOLIC BLOOD PRESSURE: 121 MMHG | DIASTOLIC BLOOD PRESSURE: 56 MMHG

## 2019-03-30 VITALS — SYSTOLIC BLOOD PRESSURE: 119 MMHG | DIASTOLIC BLOOD PRESSURE: 67 MMHG

## 2019-03-30 VITALS — SYSTOLIC BLOOD PRESSURE: 111 MMHG | DIASTOLIC BLOOD PRESSURE: 69 MMHG

## 2019-03-30 VITALS — SYSTOLIC BLOOD PRESSURE: 99 MMHG | DIASTOLIC BLOOD PRESSURE: 52 MMHG

## 2019-03-30 VITALS — DIASTOLIC BLOOD PRESSURE: 39 MMHG | SYSTOLIC BLOOD PRESSURE: 111 MMHG

## 2019-03-30 VITALS — DIASTOLIC BLOOD PRESSURE: 57 MMHG | SYSTOLIC BLOOD PRESSURE: 112 MMHG

## 2019-03-30 VITALS — SYSTOLIC BLOOD PRESSURE: 122 MMHG | DIASTOLIC BLOOD PRESSURE: 63 MMHG

## 2019-03-30 VITALS — SYSTOLIC BLOOD PRESSURE: 108 MMHG | DIASTOLIC BLOOD PRESSURE: 48 MMHG

## 2019-03-30 VITALS — DIASTOLIC BLOOD PRESSURE: 64 MMHG | SYSTOLIC BLOOD PRESSURE: 111 MMHG

## 2019-03-30 VITALS — DIASTOLIC BLOOD PRESSURE: 62 MMHG | SYSTOLIC BLOOD PRESSURE: 124 MMHG

## 2019-03-30 VITALS — DIASTOLIC BLOOD PRESSURE: 51 MMHG | SYSTOLIC BLOOD PRESSURE: 108 MMHG

## 2019-03-30 VITALS — DIASTOLIC BLOOD PRESSURE: 58 MMHG | SYSTOLIC BLOOD PRESSURE: 117 MMHG

## 2019-03-30 VITALS — SYSTOLIC BLOOD PRESSURE: 114 MMHG | DIASTOLIC BLOOD PRESSURE: 58 MMHG

## 2019-03-30 VITALS — DIASTOLIC BLOOD PRESSURE: 52 MMHG | SYSTOLIC BLOOD PRESSURE: 110 MMHG

## 2019-03-30 VITALS — SYSTOLIC BLOOD PRESSURE: 82 MMHG | DIASTOLIC BLOOD PRESSURE: 34 MMHG

## 2019-03-30 VITALS — DIASTOLIC BLOOD PRESSURE: 51 MMHG | SYSTOLIC BLOOD PRESSURE: 109 MMHG

## 2019-03-30 LAB
ABSOLUTE BASOPHILS: 0.2 THOU/UL (ref 0–0.2)
ABSOLUTE EOSINOPHILS: 0.1 THOU/UL (ref 0–0.7)
ABSOLUTE MONOCYTES: 1.1 THOU/UL (ref 0–1.2)
ALBUMIN SERPL-MCNC: 3 G/DL (ref 3.4–5)
ALP SERPL-CCNC: 37 U/L (ref 46–116)
ALT SERPL-CCNC: 13 U/L (ref 30–65)
ANION GAP SERPL CALC-SCNC: 9 MMOL/L (ref 7–16)
AST SERPL-CCNC: 17 U/L (ref 15–37)
BASOPHILS NFR BLD AUTO: 0.6 %
BILIRUB SERPL-MCNC: 0.8 MG/DL
BUN SERPL-MCNC: 39 MG/DL (ref 7–18)
CALCIUM SERPL-MCNC: 8.4 MG/DL (ref 8.5–10.1)
CHLORIDE SERPL-SCNC: 111 MMOL/L (ref 98–107)
CO2 SERPL-SCNC: 27 MMOL/L (ref 21–32)
CREAT SERPL-MCNC: 1 MG/DL (ref 0.6–1.3)
EOSINOPHIL NFR BLD: 0.5 %
GLUCOSE SERPL-MCNC: 174 MG/DL (ref 70–99)
GRANULOCYTES NFR BLD MANUAL: 88.2 %
HCT VFR BLD CALC: 23.4 % (ref 37–47)
HCT VFR BLD CALC: 24.5 % (ref 37–47)
HGB BLD-MCNC: 7.6 GM/DL (ref 12–15)
HGB BLD-MCNC: 8.1 GM/DL (ref 12–15)
LYMPHOCYTES # BLD: 1.5 THOU/UL (ref 0.8–5.3)
LYMPHOCYTES NFR BLD AUTO: 6.1 %
MAGNESIUM SERPL-MCNC: 2 MG/DL (ref 1.8–2.4)
MCH RBC QN AUTO: 26.7 PG (ref 26–34)
MCHC RBC AUTO-ENTMCNC: 32.5 G/DL (ref 28–37)
MCV RBC: 82 FL (ref 80–100)
MONOCYTES NFR BLD: 4.6 %
MPV: 8 FL. (ref 7.2–11.1)
NEUTROPHILS # BLD: 21.7 THOU/UL (ref 1.6–8.1)
NUCLEATED RBCS: 0 /100WBC
PLATELET COUNT*: 55 THOU/UL (ref 150–400)
POTASSIUM SERPL-SCNC: 3 MMOL/L (ref 3.5–5.1)
PROT SERPL-MCNC: 5.6 G/DL (ref 6.4–8.2)
RBC # BLD AUTO: 2.86 MIL/UL (ref 4.2–5)
RDW-CV: 21.9 % (ref 10.5–14.5)
SODIUM SERPL-SCNC: 147 MMOL/L (ref 136–145)
WBC # BLD AUTO: 24.6 THOU/UL (ref 4–11)

## 2019-03-31 VITALS — SYSTOLIC BLOOD PRESSURE: 100 MMHG | DIASTOLIC BLOOD PRESSURE: 49 MMHG

## 2019-03-31 VITALS — SYSTOLIC BLOOD PRESSURE: 88 MMHG | DIASTOLIC BLOOD PRESSURE: 40 MMHG

## 2019-03-31 VITALS — SYSTOLIC BLOOD PRESSURE: 94 MMHG | DIASTOLIC BLOOD PRESSURE: 49 MMHG

## 2019-03-31 VITALS — DIASTOLIC BLOOD PRESSURE: 44 MMHG | SYSTOLIC BLOOD PRESSURE: 88 MMHG

## 2019-03-31 VITALS — SYSTOLIC BLOOD PRESSURE: 112 MMHG | DIASTOLIC BLOOD PRESSURE: 55 MMHG

## 2019-03-31 VITALS — DIASTOLIC BLOOD PRESSURE: 63 MMHG | SYSTOLIC BLOOD PRESSURE: 128 MMHG

## 2019-03-31 VITALS — SYSTOLIC BLOOD PRESSURE: 107 MMHG | DIASTOLIC BLOOD PRESSURE: 49 MMHG

## 2019-03-31 VITALS — SYSTOLIC BLOOD PRESSURE: 119 MMHG | DIASTOLIC BLOOD PRESSURE: 56 MMHG

## 2019-03-31 VITALS — SYSTOLIC BLOOD PRESSURE: 84 MMHG | DIASTOLIC BLOOD PRESSURE: 45 MMHG

## 2019-03-31 VITALS — SYSTOLIC BLOOD PRESSURE: 120 MMHG | DIASTOLIC BLOOD PRESSURE: 66 MMHG

## 2019-03-31 VITALS — DIASTOLIC BLOOD PRESSURE: 62 MMHG | SYSTOLIC BLOOD PRESSURE: 117 MMHG

## 2019-03-31 VITALS — DIASTOLIC BLOOD PRESSURE: 55 MMHG | SYSTOLIC BLOOD PRESSURE: 109 MMHG

## 2019-03-31 VITALS — SYSTOLIC BLOOD PRESSURE: 120 MMHG | DIASTOLIC BLOOD PRESSURE: 59 MMHG

## 2019-03-31 VITALS — DIASTOLIC BLOOD PRESSURE: 56 MMHG | SYSTOLIC BLOOD PRESSURE: 119 MMHG

## 2019-03-31 VITALS — DIASTOLIC BLOOD PRESSURE: 35 MMHG | SYSTOLIC BLOOD PRESSURE: 83 MMHG

## 2019-03-31 VITALS — SYSTOLIC BLOOD PRESSURE: 119 MMHG | DIASTOLIC BLOOD PRESSURE: 53 MMHG

## 2019-03-31 VITALS — DIASTOLIC BLOOD PRESSURE: 47 MMHG | SYSTOLIC BLOOD PRESSURE: 87 MMHG

## 2019-03-31 VITALS — DIASTOLIC BLOOD PRESSURE: 52 MMHG | SYSTOLIC BLOOD PRESSURE: 100 MMHG

## 2019-03-31 VITALS — DIASTOLIC BLOOD PRESSURE: 43 MMHG | SYSTOLIC BLOOD PRESSURE: 88 MMHG

## 2019-03-31 VITALS — DIASTOLIC BLOOD PRESSURE: 66 MMHG | SYSTOLIC BLOOD PRESSURE: 129 MMHG

## 2019-03-31 VITALS — SYSTOLIC BLOOD PRESSURE: 85 MMHG | DIASTOLIC BLOOD PRESSURE: 61 MMHG

## 2019-03-31 VITALS — SYSTOLIC BLOOD PRESSURE: 85 MMHG | DIASTOLIC BLOOD PRESSURE: 34 MMHG

## 2019-03-31 VITALS — SYSTOLIC BLOOD PRESSURE: 106 MMHG | DIASTOLIC BLOOD PRESSURE: 47 MMHG

## 2019-03-31 VITALS — DIASTOLIC BLOOD PRESSURE: 53 MMHG | SYSTOLIC BLOOD PRESSURE: 107 MMHG

## 2019-03-31 VITALS — SYSTOLIC BLOOD PRESSURE: 111 MMHG | DIASTOLIC BLOOD PRESSURE: 54 MMHG

## 2019-03-31 VITALS — SYSTOLIC BLOOD PRESSURE: 111 MMHG | DIASTOLIC BLOOD PRESSURE: 53 MMHG

## 2019-03-31 VITALS — DIASTOLIC BLOOD PRESSURE: 49 MMHG | SYSTOLIC BLOOD PRESSURE: 99 MMHG

## 2019-03-31 VITALS — DIASTOLIC BLOOD PRESSURE: 50 MMHG | SYSTOLIC BLOOD PRESSURE: 129 MMHG

## 2019-03-31 VITALS — DIASTOLIC BLOOD PRESSURE: 58 MMHG | SYSTOLIC BLOOD PRESSURE: 121 MMHG

## 2019-03-31 VITALS — SYSTOLIC BLOOD PRESSURE: 88 MMHG | DIASTOLIC BLOOD PRESSURE: 42 MMHG

## 2019-03-31 VITALS — DIASTOLIC BLOOD PRESSURE: 52 MMHG | SYSTOLIC BLOOD PRESSURE: 101 MMHG

## 2019-03-31 VITALS — DIASTOLIC BLOOD PRESSURE: 56 MMHG | SYSTOLIC BLOOD PRESSURE: 128 MMHG

## 2019-03-31 LAB
%HYPO/RBC NFR BLD AUTO: (no result) %
ABSOLUTE BASOPHILS: 0.2 THOU/UL (ref 0–0.2)
ABSOLUTE EOSINOPHILS: 0.4 THOU/UL (ref 0–0.7)
ABSOLUTE MONOCYTES: 1.7 THOU/UL (ref 0–1.2)
ALBUMIN SERPL-MCNC: 2.9 G/DL (ref 3.4–5)
ALP SERPL-CCNC: 33 U/L (ref 46–116)
ALT SERPL-CCNC: 11 U/L (ref 30–65)
ANION GAP SERPL CALC-SCNC: 9 MMOL/L (ref 7–16)
ANISOCYTOSIS BLD QL SMEAR: (no result)
AST SERPL-CCNC: 11 U/L (ref 15–37)
BASOPHILS NFR BLD AUTO: 1 %
BILIRUB SERPL-MCNC: 0.8 MG/DL
BUN SERPL-MCNC: 37 MG/DL (ref 7–18)
CALCIUM SERPL-MCNC: 8.4 MG/DL (ref 8.5–10.1)
CERULOPLASMIN SERPL-MCNC: 25 MG/DL (ref 19–39)
CHLORIDE SERPL-SCNC: 111 MMOL/L (ref 98–107)
CO2 SERPL-SCNC: 28 MMOL/L (ref 21–32)
CREAT SERPL-MCNC: 0.9 MG/DL (ref 0.6–1.3)
EOSINOPHIL NFR BLD: 2 %
GLUCOSE SERPL-MCNC: 175 MG/DL (ref 70–99)
GRANULOCYTES NFR BLD MANUAL: 80 %
HCT VFR BLD CALC: 23.8 % (ref 37–47)
HGB BLD-MCNC: 7.7 GM/DL (ref 12–15)
LYMPHOCYTES # BLD: 1.3 THOU/UL (ref 0.8–5.3)
LYMPHOCYTES NFR BLD AUTO: 6 %
MAGNESIUM SERPL-MCNC: 1.8 MG/DL (ref 1.8–2.4)
MCH RBC QN AUTO: 26.7 PG (ref 26–34)
MCHC RBC AUTO-ENTMCNC: 32.2 G/DL (ref 28–37)
MCV RBC: 83 FL (ref 80–100)
METAMYELOCYTES NFR BLD: 2 %
MONOCYTES NFR BLD: 8 %
MPV: 8.3 FL. (ref 7.2–11.1)
NEUTROPHILS # BLD: 17.8 THOU/UL (ref 1.6–8.1)
NEUTS BAND NFR BLD: 1 %
NUCLEATED RBCS: 0 /100WBC
OVALOCYTES BLD QL SMEAR: (no result)
PLATELET # BLD EST: (no result) 10*3/UL
PLATELET COUNT*: 34 THOU/UL (ref 150–400)
POIKILOCYTOSIS BLD QL SMEAR: (no result)
POTASSIUM SERPL-SCNC: 3.5 MMOL/L (ref 3.5–5.1)
PROT SERPL-MCNC: 5.5 G/DL (ref 6.4–8.2)
RBC # BLD AUTO: 2.87 MIL/UL (ref 4.2–5)
RDW-CV: 21.5 % (ref 10.5–14.5)
SCHISTOCYTES BLD QL SMEAR: (no result)
SODIUM SERPL-SCNC: 148 MMOL/L (ref 136–145)
TEARDROPS: (no result)
WBC # BLD AUTO: 21.4 THOU/UL (ref 4–11)

## 2019-03-31 NOTE — CON
[address]  [address]                    CONSULTATION                  
_______________________________________________________________________________
 
Name:       BECHRISTY C                 Room:           [address] [place]  
M.R.#:  [id number]      Account #:      [id number]  
Admission:  03/25/19     Attend Phys:    ILDA Marmolejo
Discharge:               Date of Birth:  [birth year]  
         Report #: [id number]
                                                                     [id number]  
_______________________________________________________________________________
THIS REPORT FOR:  //name//                      
 
CC: Janice Chavarria
 
ADDENDUM TO JOB #[id number]
 
REFERRING PHYSICIAN:  Dr. Dung Chavarria.
 
I have seen and examined the patient and agree with the plan that has been
outlined by our nurse practitioner, Goldie Meier.
 
Due to the patient's findings of hepatosplenomegaly with associated ascites, I
am concerned that she has a brisk upper GI bleed from her upper GI tract causing
overt hematochezia with some element of something going on causing chronic
anemia as well.  This could also be related to portal hypertensive gastropathy. 
In any event, she is more hemodynamically stable since being transfused with 4
units packed red cells and a couple of fresh frozen plasma and for this reason,
we will proceed with upper endoscopy while she is on the ventilator.  I told the
family that there is no way we can do an upper endoscopy on her with her being
on high flow requirements and this was the best option for the same.  We will
proceed with upper endoscopy today and make further recommendations thereafter.
 
 
 
 
 
 
 
 
 
 
 
 
 
 
 
 
 
 
 
 
<ELECTRONICALLY SIGNED>
                                        By:  Nelson Dawson           
03/31/19     1347
D: 03/25/19 1805_______________________________________
T: 03/26/19 0235Nelson Dawson DO             /nt

## 2019-03-31 NOTE — CON
[address]  [address]                    CONSULTATION                  
_______________________________________________________________________________
 
Name:       CHRISTY LR                 Room:           [address] [place]  
M.R.#:  [id number]      Account #:      [id number]  
Admission:  03/25/19     Attend Phys:    ILDA Marmolejo
Discharge:               Date of Birth:  [birth year]  
         Report #: [id number]
                                                                     [id number]  
_______________________________________________________________________________
THIS REPORT FOR:  //name//                      
 
CC: Janice Chavarria
 
DICTATED BY: Goldie Meier [facility]
 
DATE OF SERVICE:  03/25/2019
 
 
PRIMARY CARE PHYSICIAN:  Janice Ibrahim M.D. 
 
Please note that at the time of this dictation, the patient was seen and
physically examined by myself.
 
REASON FOR CONSULTATION:  GI bleed.
 
HISTORY OF PRESENT ILLNESS:  This is a 78-year-old female who experienced
awakening at 4:00 a.m. yesterday morning having an episode of bright red blood
in a large amount and then had a second episode of bright red blood, again of
large quantity prompting her to come in given that she was feeling very weak at
that time.  The patient has never had any upper or lower scopes done in the past
and no prior history of any GI bleed.  On Saturday prior to this occurring, she
had a normal bowel movement, soft and formed.  Her appetite had been good.  She
complained of no abdominal discomfort at this time.  In talking with the family,
she has had somewhat "a belly for several years," which they have been aware of,
but has gotten a little bit more noticeable since she has been hospitalized. 
The patient has a history of leukemia and has not had any chemo or radiation. 
She was told that she was too weak secondary to her heart and is currently
looking at holistic medicine.
 
ALLERGIES:  PENICILLIN, SULFA and CODEINE.
 
MEDICATIONS FROM HOME:  Levothyroxine.
 
PAST MEDICAL HISTORY:  Diagnosed leukemia, uncertain of what kind 2 years ago,
does not see an oncologist for this; congestive heart failure several years ago
and hypothyroidism.  Family states that 40 years ago, she had an ovarian tumor
that she was sick for about a year and a half, never underwent chemo or
radiation and it miraculously went away.
 
PAST SURGICAL HISTORY:  Appendectomy and tonsillectomy.
 
FAMILY HISTORY:  Negative for any GI or female cancers.
 
 
 
 
[place] [address]                    CONSULTATION                  
_______________________________________________________________________________
 
Name:       BECHRISTY                 Room:           [address]#:  [id number]      Account #:      [id number]  
Admission:  03/25/19     Attend Phys:    ILDA Marmolejo
Discharge:               Date of Birth:  [birth year]  
         Report #: [id number]
                                                                     [id number]  
_______________________________________________________________________________
SOCIAL HISTORY:  Denies any alcohol, tobacco or illegal drug use.  Twelve-point
review of systems is essentially negative except what is mentioned in the HPI.
 
PHYSICAL EXAMINATION:
VITAL SIGNS:  Temperature 36.6, pulse 120, respirations are 32 and blood
pressure was 97/40. 
HEART:  Regular and tachycardic.
LUNGS:  In respiratory distress with some crackles noted.
ABDOMEN:  Soft.  Positive bowel sounds.  She is distended with some just
generalized tenderness noted throughout.
 
LABORATORY DATA:  Hemoglobin on admission was 3.9, white count was 25.1 and
platelets are 205.  Sodium 140 and potassium 6.1.  GFR was 31.  PT 18.1 and INR
1.8.  Total bilirubin 0.6, alkaline phosphatase 29, ALT is 6 and AST is 17. 
After 3 units of blood, question the accuracy of labs, hemoglobin is up to 7.9
and white count is 68,000.  Potassium remains at 6.1.  Lactic acid is 3.8.  
 
RADIOLOGICAL DATA:  CT of the abdomen and pelvis showed hepatosplenomegaly,
mild-to-moderate ascites with diffuse stranding noted, cholelithiasis and
diffuse colonic mural thickening with dilatation at the cecum and ascending and
transverse colon.  CT of the head was negative.  CT of the chest, right lower
lobe pneumonia.
 
IMPRESSION:
1.  Gastrointestinal bleed with noted colitis on the right side.
2.  Acute anemia.
3.  Ascites.
4.  Right lower lobe pneumonia.
5.  History of leukemia.
6.  History of an ovarian tumor greater than 40 years ago, questionable miracle,
it went away.
7.  Chronic kidney disease.
8.  Hyperkalemia.
 
PLAN:
1.  Continue to monitor her liver function with PT, INR and CMP.
2.  The patient may likely need a diagnostic paracentesis when she becomes more
stable and INR improves.
3.  We will need to consider colonoscopy again when she becomes more stable to
further evaluate her right-sided colitis and her GI bleed.
4.  Continue octreotide and Protonix drips.
5.  Further recommendations to be made once Dr. Dawson sees the patient later
today.
 
 
 
 
[address]  [address]                    CONSULTATION                  
_______________________________________________________________________________
 
Name:       CHRISTY LR                 Room:           [address] [place]  
M.R.#:  [id number]      Account #:      [id number]  
Admission:  03/25/19     Attend Phys:    ILDA Marmolejo
Discharge:               Date of Birth:  [birth year]  
         Report #: [id number]
                                                                     [id number]  
_______________________________________________________________________________
Thank you for allowing us to participate in this patient's care.  Please do not
hesitate to call with any questions in regard to this consult.
 
 
 
 
 
 
 
 
 
 
 
 
 
 
 
 
 
 
 
 
 
 
 
 
 
 
 
 
 
 
 
 
 
 
 
 
 
 
 
 
 
 
<ELECTRONICALLY SIGNED>
                                        By:  Nelson Dawson DO          
03/31/19     1347
D: 03/25/19 1045_______________________________________
T: 03/25/19 1220Nelson Dawson DO             /nt

## 2019-03-31 NOTE — EKG
[place] [address]
Phone:  [phone]                     ELECTROCARDIOGRAM REPORT      
_______________________________________________________________________________
 
Name:       CHRISTY LR                 Room:           [address]    ADM IN  
M.R.#:  [id number]      Account #:      [id number]  
Admission:  19     Attend Phys:    ILDA Marmolejo
Discharge:               Date of Birth:  [birth year]  
         Report #: [id number]
    [id number]
_______________________________________________________________________________
THIS REPORT FOR:  //name//                      
 
                          [facility]
                                       
Test Date:    2019               Test Time:    11:48:12
Pat Name:     CHRISTY LR             Department:   
Patient ID:   [id number]            Room:         [address]
Gender:       F                        Technician:   SRI
:          1940               Requested By: Rik Ruiz
Order Number: [id number]    Isaiah MD:   Paul Wilson
                                 Measurements
Intervals                              Axis          
Rate:         58                       P:            163
[place]:           114                      QRS:          -76
QRSD:         197                      T:            112
QT:           674                                    
QTc:          663                                    
                           Interpretive Statements
junctional rhythm with  pvc
 
Nonspecific IVCD with LAD
Abnormal T, consider ischemia, lateral leads
 
Compared to ECG 2019 08:00:21
 
 
 
 
Sinus rhythm no longer present
Prolonged QT interval no longer present
 
Electronically Signed On 3- 15:06:06 CDT by aPul Wilson
[email or web address]
 
 
 
 
 
 
 
 
 
 
  <ELECTRONICALLY SIGNED>
                                           By: Paul Wilson MD, FAC      
  19     1506
D: 19 1148   _____________________________________
T: 19 1148   Paul Wilson MD, FAC        /EPI

## 2019-04-01 VITALS — SYSTOLIC BLOOD PRESSURE: 99 MMHG | DIASTOLIC BLOOD PRESSURE: 57 MMHG

## 2019-04-01 VITALS — SYSTOLIC BLOOD PRESSURE: 103 MMHG | DIASTOLIC BLOOD PRESSURE: 57 MMHG

## 2019-04-01 VITALS — DIASTOLIC BLOOD PRESSURE: 65 MMHG | SYSTOLIC BLOOD PRESSURE: 104 MMHG

## 2019-04-01 VITALS — DIASTOLIC BLOOD PRESSURE: 53 MMHG | SYSTOLIC BLOOD PRESSURE: 101 MMHG

## 2019-04-01 VITALS — DIASTOLIC BLOOD PRESSURE: 42 MMHG | SYSTOLIC BLOOD PRESSURE: 81 MMHG

## 2019-04-01 VITALS — DIASTOLIC BLOOD PRESSURE: 54 MMHG | SYSTOLIC BLOOD PRESSURE: 104 MMHG

## 2019-04-01 VITALS — SYSTOLIC BLOOD PRESSURE: 100 MMHG | DIASTOLIC BLOOD PRESSURE: 53 MMHG

## 2019-04-01 VITALS — DIASTOLIC BLOOD PRESSURE: 43 MMHG | SYSTOLIC BLOOD PRESSURE: 82 MMHG

## 2019-04-01 VITALS — DIASTOLIC BLOOD PRESSURE: 48 MMHG | SYSTOLIC BLOOD PRESSURE: 93 MMHG

## 2019-04-01 VITALS — DIASTOLIC BLOOD PRESSURE: 54 MMHG | SYSTOLIC BLOOD PRESSURE: 99 MMHG

## 2019-04-01 VITALS — SYSTOLIC BLOOD PRESSURE: 87 MMHG | DIASTOLIC BLOOD PRESSURE: 58 MMHG

## 2019-04-01 VITALS — SYSTOLIC BLOOD PRESSURE: 128 MMHG | DIASTOLIC BLOOD PRESSURE: 72 MMHG

## 2019-04-01 VITALS — DIASTOLIC BLOOD PRESSURE: 56 MMHG | SYSTOLIC BLOOD PRESSURE: 101 MMHG

## 2019-04-01 VITALS — DIASTOLIC BLOOD PRESSURE: 54 MMHG | SYSTOLIC BLOOD PRESSURE: 113 MMHG

## 2019-04-01 VITALS — DIASTOLIC BLOOD PRESSURE: 91 MMHG | SYSTOLIC BLOOD PRESSURE: 116 MMHG

## 2019-04-01 VITALS — SYSTOLIC BLOOD PRESSURE: 118 MMHG | DIASTOLIC BLOOD PRESSURE: 72 MMHG

## 2019-04-01 VITALS — SYSTOLIC BLOOD PRESSURE: 93 MMHG | DIASTOLIC BLOOD PRESSURE: 48 MMHG

## 2019-04-01 VITALS — SYSTOLIC BLOOD PRESSURE: 113 MMHG | DIASTOLIC BLOOD PRESSURE: 61 MMHG

## 2019-04-01 VITALS — DIASTOLIC BLOOD PRESSURE: 41 MMHG | SYSTOLIC BLOOD PRESSURE: 84 MMHG

## 2019-04-01 VITALS — SYSTOLIC BLOOD PRESSURE: 86 MMHG | DIASTOLIC BLOOD PRESSURE: 39 MMHG

## 2019-04-01 VITALS — SYSTOLIC BLOOD PRESSURE: 109 MMHG | DIASTOLIC BLOOD PRESSURE: 54 MMHG

## 2019-04-01 VITALS — SYSTOLIC BLOOD PRESSURE: 135 MMHG | DIASTOLIC BLOOD PRESSURE: 89 MMHG

## 2019-04-01 VITALS — SYSTOLIC BLOOD PRESSURE: 89 MMHG | DIASTOLIC BLOOD PRESSURE: 52 MMHG

## 2019-04-01 VITALS — SYSTOLIC BLOOD PRESSURE: 112 MMHG | DIASTOLIC BLOOD PRESSURE: 56 MMHG

## 2019-04-01 VITALS — DIASTOLIC BLOOD PRESSURE: 48 MMHG | SYSTOLIC BLOOD PRESSURE: 103 MMHG

## 2019-04-01 VITALS — SYSTOLIC BLOOD PRESSURE: 113 MMHG | DIASTOLIC BLOOD PRESSURE: 57 MMHG

## 2019-04-01 VITALS — SYSTOLIC BLOOD PRESSURE: 103 MMHG | DIASTOLIC BLOOD PRESSURE: 63 MMHG

## 2019-04-01 VITALS — DIASTOLIC BLOOD PRESSURE: 48 MMHG | SYSTOLIC BLOOD PRESSURE: 89 MMHG

## 2019-04-01 VITALS — DIASTOLIC BLOOD PRESSURE: 53 MMHG | SYSTOLIC BLOOD PRESSURE: 99 MMHG

## 2019-04-01 VITALS — DIASTOLIC BLOOD PRESSURE: 45 MMHG | SYSTOLIC BLOOD PRESSURE: 92 MMHG

## 2019-04-01 VITALS — SYSTOLIC BLOOD PRESSURE: 95 MMHG | DIASTOLIC BLOOD PRESSURE: 73 MMHG

## 2019-04-01 VITALS — SYSTOLIC BLOOD PRESSURE: 102 MMHG | DIASTOLIC BLOOD PRESSURE: 52 MMHG

## 2019-04-01 VITALS — SYSTOLIC BLOOD PRESSURE: 108 MMHG | DIASTOLIC BLOOD PRESSURE: 63 MMHG

## 2019-04-01 VITALS — SYSTOLIC BLOOD PRESSURE: 103 MMHG | DIASTOLIC BLOOD PRESSURE: 55 MMHG

## 2019-04-01 LAB
ABSOLUTE BASOPHILS: 0.1 THOU/UL (ref 0–0.2)
ABSOLUTE EOSINOPHILS: 0.1 THOU/UL (ref 0–0.7)
ABSOLUTE MONOCYTES: 0.4 THOU/UL (ref 0–1.2)
ALBUMIN SERPL-MCNC: 2.7 G/DL (ref 3.4–5)
ALP SERPL-CCNC: 33 U/L (ref 46–116)
ALT SERPL-CCNC: 13 U/L (ref 30–65)
ANION GAP SERPL CALC-SCNC: 7 MMOL/L (ref 7–16)
ANISOCYTOSIS BLD QL SMEAR: (no result)
AST SERPL-CCNC: 14 U/L (ref 15–37)
BASOPHILS NFR BLD AUTO: 1 %
BF LYMPHOCYTES: 36 %
BF MONOCYTES: 29 %
BF POLYS: 35 %
BF RBC: 1060 /MM3
BF TISSUE: 8 /100 WBC
BILIRUB SERPL-MCNC: 0.7 MG/DL
BUN SERPL-MCNC: 40 MG/DL (ref 7–18)
CALCIUM SERPL-MCNC: 8.1 MG/DL (ref 8.5–10.1)
CHLORIDE SERPL-SCNC: 108 MMOL/L (ref 98–107)
CLARITY UR: CLEAR
CO2 SERPL-SCNC: 27 MMOL/L (ref 21–32)
CREAT SERPL-MCNC: 0.8 MG/DL (ref 0.6–1.3)
EOSINOPHIL NFR BLD: 1.1 %
GLUCOSE SERPL-MCNC: 114 MG/DL (ref 70–99)
GRANULOCYTES NFR BLD MANUAL: 82.7 %
HCT VFR BLD CALC: 21 % (ref 37–47)
HCT VFR BLD CALC: 24.8 % (ref 37–47)
HGB BLD-MCNC: 6.7 GM/DL (ref 12–15)
HGB BLD-MCNC: 8.2 GM/DL (ref 12–15)
INR PPP: 1.2
LYMPHOCYTES # BLD: 0.9 THOU/UL (ref 0.8–5.3)
LYMPHOCYTES NFR BLD AUTO: 10.1 %
MAGNESIUM SERPL-MCNC: 2.1 MG/DL (ref 1.8–2.4)
MCH RBC QN AUTO: 26.6 PG (ref 26–34)
MCHC RBC AUTO-ENTMCNC: 31.7 G/DL (ref 28–37)
MCV RBC: 83.9 FL (ref 80–100)
MONOCYTES NFR BLD: 5.1 %
MPV: 8.4 FL. (ref 7.2–11.1)
NEUTROPHILS # BLD: 7.1 THOU/UL (ref 1.6–8.1)
NUCLEATED RBCS: 1 /100WBC
OVALOCYTES BLD QL SMEAR: (no result)
PLATELET # BLD EST: (no result) 10*3/UL
PLATELET COUNT*: 29 THOU/UL (ref 150–400)
POIKILOCYTOSIS BLD QL SMEAR: (no result)
POLYCHROMASIA BLD QL SMEAR: (no result)
POTASSIUM SERPL-SCNC: 4.1 MMOL/L (ref 3.5–5.1)
PROT SERPL-MCNC: 5.2 G/DL (ref 6.4–8.2)
PROTHROMBIN TIME: 12.6 SECONDS (ref 9.2–11.5)
RBC # BLD AUTO: 2.51 MIL/UL (ref 4.2–5)
RDW-CV: 22.6 % (ref 10.5–14.5)
SODIUM SERPL-SCNC: 142 MMOL/L (ref 136–145)
SOURCE: (no result)
SPECIMEN VOL 24H UR: 5060 ML
TEARDROPS: (no result)
TOTAL CELL COUNT: 143 /MM3
WBC # BLD AUTO: 8.6 THOU/UL (ref 4–11)

## 2019-04-01 NOTE — PATH
[address]  [address]                    PATHOLOGY RPT PROCEDURE       
_______________________________________________________________________________
 
Name:       ANA CEDILLO                 Room:           [address] [place]  
[facility].#:  [id number]      Account #:      [id number]  
Admission:  03/25/19     Date of Birth:  [birth year]  
Discharge:                             Report #:    [id number]
                                                         Path Case #: [id number]
_______________________________________________________________________________
 
LCA Accession Number: [id number]
.                                                                01
Material submitted:                                        .
DISTAL SIGMOID COLON POLYP
.                                                                01
Clinical history:                                          .
None provided
.                                                                02
**********************************************************************
Diagnosis:
Distal sigmoid colon polyp:
- Tubular adenoma, negative for high-grade dysplasia.
(ELENA:altagracia; 04/01/2019)
MBR/04/01/2019
**********************************************************************
.                                                                02
Electronically signed:                                     .
Mik Martin MD, Pathologist
NPI- [id number]
.                                                                01
Gross description:                                         .
Received in formalin labeled "Ana Cedillo, distal sigmoid colon polyp,"
are four segments of tan to tan-brown soft tissues measuring 1.0 x 1.0 x
0.5 cm in aggregate dimensions and ranging from 0.3 to 0.7 cm in maximum
dimension admixed with white-green possible vegetative material.  The
largest segment is inked and bisected, and the specimen is submitted
entirely in cassette A1.
(DAC; 3/29/2019)
XDC/XDC
.                                                                02
Pathologist provided ICD-10:
D12.5
.                                                                02
CPT                                                        .
067037
Specimen Comment: A courtesy copy of this report has been sent to
Specimen Comment: [phone], [phone], [phone].
Specimen Comment: Report sent to ,DR DAWSON / DR PETTIT
***Performed at:  01
   [address], [place]  [id number]
   MD Dez Lopez MD Phone:  [phone]
***Performed at:  02
   [facility]
   [address], [place], [place]  [address]
   MD Mik Martin MD Phone:  [phone]

## 2019-04-02 VITALS — SYSTOLIC BLOOD PRESSURE: 89 MMHG | DIASTOLIC BLOOD PRESSURE: 45 MMHG

## 2019-04-02 VITALS — SYSTOLIC BLOOD PRESSURE: 92 MMHG | DIASTOLIC BLOOD PRESSURE: 50 MMHG

## 2019-04-02 VITALS — DIASTOLIC BLOOD PRESSURE: 49 MMHG | SYSTOLIC BLOOD PRESSURE: 78 MMHG

## 2019-04-02 VITALS — DIASTOLIC BLOOD PRESSURE: 51 MMHG | SYSTOLIC BLOOD PRESSURE: 98 MMHG

## 2019-04-02 VITALS — DIASTOLIC BLOOD PRESSURE: 32 MMHG | SYSTOLIC BLOOD PRESSURE: 86 MMHG

## 2019-04-02 VITALS — SYSTOLIC BLOOD PRESSURE: 84 MMHG | DIASTOLIC BLOOD PRESSURE: 43 MMHG

## 2019-04-02 VITALS — DIASTOLIC BLOOD PRESSURE: 55 MMHG | SYSTOLIC BLOOD PRESSURE: 109 MMHG

## 2019-04-02 VITALS — DIASTOLIC BLOOD PRESSURE: 60 MMHG | SYSTOLIC BLOOD PRESSURE: 107 MMHG

## 2019-04-02 VITALS — DIASTOLIC BLOOD PRESSURE: 40 MMHG | SYSTOLIC BLOOD PRESSURE: 83 MMHG

## 2019-04-02 VITALS — DIASTOLIC BLOOD PRESSURE: 37 MMHG | SYSTOLIC BLOOD PRESSURE: 75 MMHG

## 2019-04-02 VITALS — SYSTOLIC BLOOD PRESSURE: 84 MMHG | DIASTOLIC BLOOD PRESSURE: 45 MMHG

## 2019-04-02 LAB
ALBUMIN SERPL-MCNC: 2.6 G/DL (ref 3.4–5)
ALP SERPL-CCNC: 36 U/L (ref 46–116)
ALT SERPL-CCNC: 10 U/L (ref 30–65)
ANION GAP SERPL CALC-SCNC: 8 MMOL/L (ref 7–16)
AST SERPL-CCNC: 14 U/L (ref 15–37)
BILIRUB SERPL-MCNC: 0.6 MG/DL
BODY FLUID LDH: 62 IU/L
BUN SERPL-MCNC: 44 MG/DL (ref 7–18)
CALCIUM SERPL-MCNC: 8.1 MG/DL (ref 8.5–10.1)
CHLORIDE SERPL-SCNC: 109 MMOL/L (ref 98–107)
CO2 SERPL-SCNC: 27 MMOL/L (ref 21–32)
CREAT SERPL-MCNC: 1 MG/DL (ref 0.6–1.3)
EST. AVERAGE GLUCOSE BLD GHB EST-MCNC: 103 MG/DL
GLUCOSE SERPL-MCNC: 94 MG/DL (ref 70–99)
GLYCOHEMOGLOBIN (HGB A1C): 5.2 % (ref 4.8–5.6)
HCT VFR BLD CALC: 22.8 % (ref 37–47)
HGB BLD-MCNC: 7.4 GM/DL (ref 12–15)
INR PPP: 1.3
MAGNESIUM SERPL-MCNC: 2.1 MG/DL (ref 1.8–2.4)
MCH RBC QN AUTO: 27.3 PG (ref 26–34)
MCHC RBC AUTO-ENTMCNC: 32.6 G/DL (ref 28–37)
MCV RBC: 83.8 FL (ref 80–100)
MPV: 8.4 FL. (ref 7.2–11.1)
PLATELET COUNT*: 44 THOU/UL (ref 150–400)
POTASSIUM SERPL-SCNC: 4.1 MMOL/L (ref 3.5–5.1)
PROT SERPL-MCNC: 5.2 G/DL (ref 6.4–8.2)
PROTHROMBIN TIME: 12.9 SECONDS (ref 9.2–11.5)
RBC # BLD AUTO: 2.72 MIL/UL (ref 4.2–5)
RDW-CV: 21.9 % (ref 10.5–14.5)
SODIUM SERPL-SCNC: 144 MMOL/L (ref 136–145)
WBC # BLD AUTO: 7.1 THOU/UL (ref 4–11)

## 2019-04-02 PROCEDURE — 0W9G3ZZ DRAINAGE OF PERITONEAL CAVITY, PERCUTANEOUS APPROACH: ICD-10-PCS | Performed by: RADIOLOGY

## 2019-04-03 VITALS — SYSTOLIC BLOOD PRESSURE: 118 MMHG | DIASTOLIC BLOOD PRESSURE: 56 MMHG

## 2019-04-03 VITALS — SYSTOLIC BLOOD PRESSURE: 106 MMHG | DIASTOLIC BLOOD PRESSURE: 62 MMHG

## 2019-04-03 VITALS — DIASTOLIC BLOOD PRESSURE: 62 MMHG | SYSTOLIC BLOOD PRESSURE: 119 MMHG

## 2019-04-03 VITALS — DIASTOLIC BLOOD PRESSURE: 55 MMHG | SYSTOLIC BLOOD PRESSURE: 99 MMHG

## 2019-04-03 VITALS — SYSTOLIC BLOOD PRESSURE: 101 MMHG | DIASTOLIC BLOOD PRESSURE: 57 MMHG

## 2019-04-03 VITALS — SYSTOLIC BLOOD PRESSURE: 121 MMHG | DIASTOLIC BLOOD PRESSURE: 65 MMHG

## 2019-04-03 LAB
ALBUMIN SERPL-MCNC: 3 G/DL (ref 3.4–5)
ALP SERPL-CCNC: 45 U/L (ref 46–116)
ALT SERPL-CCNC: 11 U/L (ref 30–65)
ANION GAP SERPL CALC-SCNC: 8 MMOL/L (ref 7–16)
AST SERPL-CCNC: 16 U/L (ref 15–37)
BILIRUB SERPL-MCNC: 0.6 MG/DL
BUN SERPL-MCNC: 44 MG/DL (ref 7–18)
CALCIUM SERPL-MCNC: 8 MG/DL (ref 8.5–10.1)
CHLORIDE SERPL-SCNC: 109 MMOL/L (ref 98–107)
CO2 SERPL-SCNC: 26 MMOL/L (ref 21–32)
CREAT SERPL-MCNC: 0.9 MG/DL (ref 0.6–1.3)
GLUCOSE SERPL-MCNC: 160 MG/DL (ref 70–99)
HCT VFR BLD CALC: 25.7 % (ref 37–47)
HGB BLD-MCNC: 8.3 GM/DL (ref 12–15)
MAGNESIUM SERPL-MCNC: 2.2 MG/DL (ref 1.8–2.4)
MCH RBC QN AUTO: 27.3 PG (ref 26–34)
MCHC RBC AUTO-ENTMCNC: 32.4 G/DL (ref 28–37)
MCV RBC: 84.1 FL (ref 80–100)
MPV: 8.4 FL. (ref 7.2–11.1)
PLATELET COUNT*: 50 THOU/UL (ref 150–400)
POTASSIUM SERPL-SCNC: 4.8 MMOL/L (ref 3.5–5.1)
PROT SERPL-MCNC: 5.7 G/DL (ref 6.4–8.2)
RBC # BLD AUTO: 3.06 MIL/UL (ref 4.2–5)
RDW-CV: 22.8 % (ref 10.5–14.5)
SODIUM SERPL-SCNC: 143 MMOL/L (ref 136–145)
SOURCE: (no result)
WBC # BLD AUTO: 12.9 THOU/UL (ref 4–11)

## 2019-04-04 VITALS — SYSTOLIC BLOOD PRESSURE: 102 MMHG | DIASTOLIC BLOOD PRESSURE: 65 MMHG

## 2019-04-04 VITALS — SYSTOLIC BLOOD PRESSURE: 111 MMHG | DIASTOLIC BLOOD PRESSURE: 58 MMHG

## 2019-04-04 VITALS — SYSTOLIC BLOOD PRESSURE: 115 MMHG | DIASTOLIC BLOOD PRESSURE: 65 MMHG

## 2019-04-04 VITALS — SYSTOLIC BLOOD PRESSURE: 108 MMHG | DIASTOLIC BLOOD PRESSURE: 60 MMHG

## 2019-04-04 VITALS — DIASTOLIC BLOOD PRESSURE: 62 MMHG | SYSTOLIC BLOOD PRESSURE: 118 MMHG

## 2019-04-04 VITALS — SYSTOLIC BLOOD PRESSURE: 120 MMHG | DIASTOLIC BLOOD PRESSURE: 62 MMHG

## 2019-04-04 VITALS — SYSTOLIC BLOOD PRESSURE: 99 MMHG | DIASTOLIC BLOOD PRESSURE: 44 MMHG

## 2019-04-04 LAB
ALBUMIN SERPL-MCNC: 3.1 G/DL (ref 3.4–5)
ALP SERPL-CCNC: 45 U/L (ref 46–116)
ALT SERPL-CCNC: 14 U/L (ref 30–65)
ANION GAP SERPL CALC-SCNC: 8 MMOL/L (ref 7–16)
AST SERPL-CCNC: 15 U/L (ref 15–37)
BILIRUB SERPL-MCNC: 0.5 MG/DL
BUN SERPL-MCNC: 41 MG/DL (ref 7–18)
CALCIUM SERPL-MCNC: 8.2 MG/DL (ref 8.5–10.1)
CHLORIDE SERPL-SCNC: 110 MMOL/L (ref 98–107)
CO2 SERPL-SCNC: 26 MMOL/L (ref 21–32)
CREAT SERPL-MCNC: 0.8 MG/DL (ref 0.6–1.3)
GLUCOSE SERPL-MCNC: 146 MG/DL (ref 70–99)
HCT VFR BLD CALC: 28.5 % (ref 37–47)
HGB BLD-MCNC: 9 GM/DL (ref 12–15)
INR PPP: 1.2
MAGNESIUM SERPL-MCNC: 2.2 MG/DL (ref 1.8–2.4)
MCH RBC QN AUTO: 27.3 PG (ref 26–34)
MCHC RBC AUTO-ENTMCNC: 31.7 G/DL (ref 28–37)
MCV RBC: 86.1 FL (ref 80–100)
MPV: 9.1 FL. (ref 7.2–11.1)
PLATELET COUNT*: 72 THOU/UL (ref 150–400)
POTASSIUM SERPL-SCNC: 4.6 MMOL/L (ref 3.5–5.1)
PROT SERPL-MCNC: 6.1 G/DL (ref 6.4–8.2)
PROTHROMBIN TIME: 12.5 SECONDS (ref 9.2–11.5)
RBC # BLD AUTO: 3.31 MIL/UL (ref 4.2–5)
RDW-CV: 23.5 % (ref 10.5–14.5)
SODIUM SERPL-SCNC: 144 MMOL/L (ref 136–145)
WBC # BLD AUTO: 21.8 THOU/UL (ref 4–11)

## 2019-04-04 PROCEDURE — 0W9G3ZZ DRAINAGE OF PERITONEAL CAVITY, PERCUTANEOUS APPROACH: ICD-10-PCS | Performed by: RADIOLOGY

## 2019-04-04 NOTE — PATH
[address]  [address]                    PATHOLOGY RPT PROCEDURE       
_______________________________________________________________________________
 
Name:       CHRISTY LR MONCHO                 Room:           [address] [place]  
[facility]#:  [id number]      Account #:      [id number]  
Admission:  03/25/19     Date of Birth:  [birth year]  
Discharge:                             Report #:    [id number]
                                                         Path Case #: [id number]
_______________________________________________________________________________
Note
LCA Accession Number: [id number]
   TESTS               RESULT  FLAG  UNITS    REF RANGE  LAB
------------------------------------------------------------
   Clinician Provided Cytology Information
   No. of containers..01 Other (Miscellaneous)
Source:                                                   01
   ASCITES
Clinician ICD10:                                          01
   A41.9
   J15.6
DIAGNOSIS:                                                02
   ASCITES
   NEGATIVE FOR OVERT MALIGNANT CELLS.
   MESOTHELIAL CELLS AND FEW INFLAMMATORY CELLS, PREDOMINANTLY
   MORPHOLOGICALLY BENIGN-APPEARING LYMPHOCYTES.
   THIS INTERPRETATION INCLUDES EVALUATION OF A CELL BLOCK.
Signed out by:                                            02
   Mik Martin MD, Pathologist
   NPI- [id number]
Performed by:                                             Zhang Brennan, Cytotechnologist ([facility])
Gross description:                                        01
   70ML, YELLOW, HAZY
   /LCS
 
------------------------------------------------------------
    FLAG LEGEND:
    L-Low Normal,H-High Normal,LL-Alert Low,HH-Alert High
    <-Panic Low,>-Panic High,A-Abnormal,AA-Critical Abnormal
------------------------------------------------------------
 
Performed at:
[address]
   [place]  [address]
   Dez Lopez MD, Phone: [phone]
[address] [place] [address]
   [address] [facility], [place]  [address]
   Mik Martin MD, Phone: [phone]
Specimen Comment: A courtesy copy of this report has been sent to
Specimen Comment: [phone], [phone], [phone].
Specimen Comment: Report sent to ,DR PETTIT / DR DAWSON
Specimen Comment: A duplicate report has been generated due to demographic
updates.
***Performed at:  01
   [address], [place], [place]  [id number]
   MD Dez Lopez MD Phone:  [phone]

## 2019-04-05 VITALS — DIASTOLIC BLOOD PRESSURE: 53 MMHG | SYSTOLIC BLOOD PRESSURE: 105 MMHG

## 2019-04-05 VITALS — SYSTOLIC BLOOD PRESSURE: 102 MMHG | DIASTOLIC BLOOD PRESSURE: 53 MMHG

## 2019-04-05 VITALS — SYSTOLIC BLOOD PRESSURE: 106 MMHG | DIASTOLIC BLOOD PRESSURE: 61 MMHG

## 2019-04-05 VITALS — SYSTOLIC BLOOD PRESSURE: 100 MMHG | DIASTOLIC BLOOD PRESSURE: 52 MMHG

## 2019-04-05 LAB
ABSOLUTE EOSINOPHILS: 0.2 THOU/UL (ref 0–0.7)
ABSOLUTE MONOCYTES: 1 THOU/UL (ref 0–1.2)
ALBUMIN SERPL-MCNC: 3 G/DL (ref 3.4–5)
ALP SERPL-CCNC: 37 U/L (ref 46–116)
ALT SERPL-CCNC: 14 U/L (ref 30–65)
ANION GAP SERPL CALC-SCNC: 6 MMOL/L (ref 7–16)
ANISOCYTOSIS BLD QL SMEAR: (no result)
AST SERPL-CCNC: 16 U/L (ref 15–37)
BILIRUB SERPL-MCNC: 0.3 MG/DL
BUN SERPL-MCNC: 41 MG/DL (ref 7–18)
CALCIUM SERPL-MCNC: 8.1 MG/DL (ref 8.5–10.1)
CHLORIDE SERPL-SCNC: 110 MMOL/L (ref 98–107)
CO2 SERPL-SCNC: 27 MMOL/L (ref 21–32)
CREAT SERPL-MCNC: 0.8 MG/DL (ref 0.6–1.3)
EOSINOPHIL NFR BLD: 2 %
GLUCOSE SERPL-MCNC: 155 MG/DL (ref 70–99)
GRANULOCYTES NFR BLD MANUAL: 73 %
HCT VFR BLD CALC: 23.6 % (ref 37–47)
HGB BLD-MCNC: 7.6 GM/DL (ref 12–15)
LYMPHOCYTES # BLD: 1.1 THOU/UL (ref 0.8–5.3)
LYMPHOCYTES NFR BLD AUTO: 9 %
MAGNESIUM SERPL-MCNC: 2.2 MG/DL (ref 1.8–2.4)
MCH RBC QN AUTO: 27.6 PG (ref 26–34)
MCHC RBC AUTO-ENTMCNC: 32.3 G/DL (ref 28–37)
MCV RBC: 85.4 FL (ref 80–100)
METAMYELOCYTES NFR BLD: 3 %
MICROCYTES: (no result)
MONOCYTES NFR BLD: 8 %
MPV: 8.5 FL. (ref 7.2–11.1)
MYELOCYTES NFR BLD: 2 %
NEUTROPHILS # BLD: 9.9 THOU/UL (ref 1.6–8.1)
NEUTS BAND NFR BLD: 3 %
OVALOCYTES BLD QL SMEAR: (no result)
PLATELET # BLD EST: (no result) 10*3/UL
PLATELET COUNT*: 63 THOU/UL (ref 150–400)
POLYCHROMASIA BLD QL SMEAR: (no result)
POTASSIUM SERPL-SCNC: 4.6 MMOL/L (ref 3.5–5.1)
PROT SERPL-MCNC: 5.5 G/DL (ref 6.4–8.2)
RBC # BLD AUTO: 2.77 MIL/UL (ref 4.2–5)
RDW-CV: 23.5 % (ref 10.5–14.5)
SCHISTOCYTES BLD QL SMEAR: (no result)
SODIUM SERPL-SCNC: 143 MMOL/L (ref 136–145)
TEARDROPS: (no result)
WBC # BLD AUTO: 12.2 THOU/UL (ref 4–11)

## 2019-04-05 PROCEDURE — 07DR3ZX EXTRACTION OF ILIAC BONE MARROW, PERCUTANEOUS APPROACH, DIAGNOSTIC: ICD-10-PCS

## 2019-04-06 VITALS — DIASTOLIC BLOOD PRESSURE: 47 MMHG | SYSTOLIC BLOOD PRESSURE: 98 MMHG

## 2019-04-06 VITALS — SYSTOLIC BLOOD PRESSURE: 100 MMHG | DIASTOLIC BLOOD PRESSURE: 43 MMHG

## 2019-04-06 VITALS — SYSTOLIC BLOOD PRESSURE: 101 MMHG | DIASTOLIC BLOOD PRESSURE: 55 MMHG

## 2019-04-06 LAB
ALBUMIN SERPL-MCNC: 3.1 G/DL (ref 3.4–5)
ALP SERPL-CCNC: 45 U/L (ref 46–116)
ALT SERPL-CCNC: 14 U/L (ref 30–65)
ANION GAP SERPL CALC-SCNC: 8 MMOL/L (ref 7–16)
AST SERPL-CCNC: 13 U/L (ref 15–37)
BILIRUB SERPL-MCNC: 0.4 MG/DL
BUN SERPL-MCNC: 38 MG/DL (ref 7–18)
CALCIUM SERPL-MCNC: 8.2 MG/DL (ref 8.5–10.1)
CHLORIDE SERPL-SCNC: 109 MMOL/L (ref 98–107)
CO2 SERPL-SCNC: 24 MMOL/L (ref 21–32)
CREAT SERPL-MCNC: 1 MG/DL (ref 0.6–1.3)
GLUCOSE SERPL-MCNC: 148 MG/DL (ref 70–99)
HCT VFR BLD CALC: 27.7 % (ref 37–47)
HGB BLD-MCNC: 8.9 GM/DL (ref 12–15)
MAGNESIUM SERPL-MCNC: 2.2 MG/DL (ref 1.8–2.4)
MCH RBC QN AUTO: 27.3 PG (ref 26–34)
MCHC RBC AUTO-ENTMCNC: 32.1 G/DL (ref 28–37)
MCV RBC: 84.9 FL (ref 80–100)
MPV: 9.5 FL. (ref 7.2–11.1)
PLATELET COUNT*: 91 THOU/UL (ref 150–400)
POTASSIUM SERPL-SCNC: 5 MMOL/L (ref 3.5–5.1)
PROT SERPL-MCNC: 5.9 G/DL (ref 6.4–8.2)
RBC # BLD AUTO: 3.26 MIL/UL (ref 4.2–5)
RDW-CV: 23.1 % (ref 10.5–14.5)
SODIUM SERPL-SCNC: 141 MMOL/L (ref 136–145)
WBC # BLD AUTO: 17 THOU/UL (ref 4–11)

## 2019-04-07 VITALS — DIASTOLIC BLOOD PRESSURE: 43 MMHG | SYSTOLIC BLOOD PRESSURE: 99 MMHG

## 2019-04-07 VITALS — SYSTOLIC BLOOD PRESSURE: 89 MMHG | DIASTOLIC BLOOD PRESSURE: 37 MMHG

## 2019-04-07 VITALS — DIASTOLIC BLOOD PRESSURE: 55 MMHG | SYSTOLIC BLOOD PRESSURE: 99 MMHG

## 2019-04-07 VITALS — SYSTOLIC BLOOD PRESSURE: 107 MMHG | DIASTOLIC BLOOD PRESSURE: 56 MMHG

## 2019-04-07 VITALS — SYSTOLIC BLOOD PRESSURE: 101 MMHG | DIASTOLIC BLOOD PRESSURE: 53 MMHG

## 2019-04-07 LAB
ANION GAP SERPL CALC-SCNC: 7 MMOL/L (ref 7–16)
BUN SERPL-MCNC: 37 MG/DL (ref 7–18)
CALCIUM SERPL-MCNC: 8.3 MG/DL (ref 8.5–10.1)
CHLORIDE SERPL-SCNC: 105 MMOL/L (ref 98–107)
CO2 SERPL-SCNC: 26 MMOL/L (ref 21–32)
CREAT SERPL-MCNC: 0.9 MG/DL (ref 0.6–1.3)
GLUCOSE SERPL-MCNC: 155 MG/DL (ref 70–99)
HCT VFR BLD CALC: 26.8 % (ref 37–47)
HGB BLD-MCNC: 8.7 GM/DL (ref 12–15)
MAGNESIUM SERPL-MCNC: 2.2 MG/DL (ref 1.8–2.4)
MCH RBC QN AUTO: 27.1 PG (ref 26–34)
MCHC RBC AUTO-ENTMCNC: 32.2 G/DL (ref 28–37)
MCV RBC: 84 FL (ref 80–100)
MPV: 8.5 FL. (ref 7.2–11.1)
PLATELET COUNT*: 94 THOU/UL (ref 150–400)
POTASSIUM SERPL-SCNC: 5.3 MMOL/L (ref 3.5–5.1)
RBC # BLD AUTO: 3.19 MIL/UL (ref 4.2–5)
RDW-CV: 23.6 % (ref 10.5–14.5)
SODIUM SERPL-SCNC: 138 MMOL/L (ref 136–145)
WBC # BLD AUTO: 15 THOU/UL (ref 4–11)

## 2019-04-08 VITALS — DIASTOLIC BLOOD PRESSURE: 53 MMHG | SYSTOLIC BLOOD PRESSURE: 103 MMHG

## 2019-04-08 VITALS — SYSTOLIC BLOOD PRESSURE: 96 MMHG | DIASTOLIC BLOOD PRESSURE: 44 MMHG

## 2019-04-08 VITALS — DIASTOLIC BLOOD PRESSURE: 44 MMHG | SYSTOLIC BLOOD PRESSURE: 96 MMHG

## 2019-04-08 VITALS — SYSTOLIC BLOOD PRESSURE: 93 MMHG | DIASTOLIC BLOOD PRESSURE: 46 MMHG

## 2019-04-08 LAB
ABSOLUTE MONOCYTES: 0.6 THOU/UL (ref 0–1.2)
ALBUMIN SERPL-MCNC: 3.1 G/DL (ref 3.4–5)
ALP SERPL-CCNC: 46 U/L (ref 46–116)
ALT SERPL-CCNC: 23 U/L (ref 30–65)
AMMONIA PLAS-SCNC: 12 UMOL/L (ref 11–32)
ANION GAP SERPL CALC-SCNC: 7 MMOL/L (ref 7–16)
ANISOCYTOSIS BLD QL SMEAR: (no result)
AST SERPL-CCNC: 15 U/L (ref 15–37)
BILIRUB SERPL-MCNC: 0.4 MG/DL
BUN SERPL-MCNC: 38 MG/DL (ref 7–18)
CALCIUM SERPL-MCNC: 7.9 MG/DL (ref 8.5–10.1)
CHLORIDE SERPL-SCNC: 103 MMOL/L (ref 98–107)
CO2 SERPL-SCNC: 27 MMOL/L (ref 21–32)
CREAT SERPL-MCNC: 1.1 MG/DL (ref 0.6–1.3)
GLUCOSE SERPL-MCNC: 108 MG/DL (ref 70–99)
GRANULOCYTES NFR BLD MANUAL: 85 %
HCT VFR BLD CALC: 29.1 % (ref 37–47)
HGB BLD-MCNC: 9.3 GM/DL (ref 12–15)
INR PPP: 1.2
LYMPHOCYTES # BLD: 1.7 THOU/UL (ref 0.8–5.3)
LYMPHOCYTES NFR BLD AUTO: 6 %
MAGNESIUM SERPL-MCNC: 1.9 MG/DL (ref 1.8–2.4)
MCH RBC QN AUTO: 26.8 PG (ref 26–34)
MCHC RBC AUTO-ENTMCNC: 32.1 G/DL (ref 28–37)
MCV RBC: 83.7 FL (ref 80–100)
METAMYELOCYTES NFR BLD: 1 %
MONOCYTES NFR BLD: 3 %
MPV: 8.7 FL. (ref 7.2–11.1)
MYELOCYTES NFR BLD: 1 %
NEUTROPHILS # BLD: 16.8 THOU/UL (ref 1.6–8.1)
NEUTS BAND NFR BLD: 1 %
NUCLEATED RBCS: 1 /100WBC
PLATELET # BLD EST: ADEQUATE 10*3/UL
PLATELET COUNT*: 141 THOU/UL (ref 150–400)
POTASSIUM SERPL-SCNC: 4.9 MMOL/L (ref 3.5–5.1)
PROT SERPL-MCNC: 5.8 G/DL (ref 6.4–8.2)
PROTHROMBIN TIME: 11.9 SECONDS (ref 9.2–11.5)
RBC # BLD AUTO: 3.47 MIL/UL (ref 4.2–5)
RDW-CV: 23.4 % (ref 10.5–14.5)
SODIUM SERPL-SCNC: 137 MMOL/L (ref 136–145)
VARIANT LYMPHS NFR BLD MANUAL: 3 %
WBC # BLD AUTO: 19.1 THOU/UL (ref 4–11)

## 2019-04-09 ENCOUNTER — HOSPITAL ENCOUNTER (INPATIENT)
Dept: HOSPITAL 96 - M.3W | Age: 79
LOS: 17 days | Discharge: HOME HEALTH | DRG: 947 | End: 2019-04-26
Attending: PHYSICAL MEDICINE & REHABILITATION | Admitting: PHYSICAL MEDICINE & REHABILITATION
Payer: MEDICARE

## 2019-04-09 VITALS — BODY MASS INDEX: 16.86 KG/M2 | HEIGHT: 65 IN | WEIGHT: 101.2 LBS

## 2019-04-09 VITALS — SYSTOLIC BLOOD PRESSURE: 92 MMHG | DIASTOLIC BLOOD PRESSURE: 47 MMHG

## 2019-04-09 VITALS — SYSTOLIC BLOOD PRESSURE: 86 MMHG | DIASTOLIC BLOOD PRESSURE: 44 MMHG

## 2019-04-09 DIAGNOSIS — R18.8: ICD-10-CM

## 2019-04-09 DIAGNOSIS — I50.22: ICD-10-CM

## 2019-04-09 DIAGNOSIS — Z87.891: ICD-10-CM

## 2019-04-09 DIAGNOSIS — E87.0: ICD-10-CM

## 2019-04-09 DIAGNOSIS — C92.10: ICD-10-CM

## 2019-04-09 DIAGNOSIS — Z90.89: ICD-10-CM

## 2019-04-09 DIAGNOSIS — J96.01: ICD-10-CM

## 2019-04-09 DIAGNOSIS — Z88.2: ICD-10-CM

## 2019-04-09 DIAGNOSIS — D69.6: ICD-10-CM

## 2019-04-09 DIAGNOSIS — Z88.6: ICD-10-CM

## 2019-04-09 DIAGNOSIS — K76.6: ICD-10-CM

## 2019-04-09 DIAGNOSIS — E44.0: ICD-10-CM

## 2019-04-09 DIAGNOSIS — K74.60: ICD-10-CM

## 2019-04-09 DIAGNOSIS — J18.9: ICD-10-CM

## 2019-04-09 DIAGNOSIS — R57.9: ICD-10-CM

## 2019-04-09 DIAGNOSIS — D58.9: ICD-10-CM

## 2019-04-09 DIAGNOSIS — G89.29: ICD-10-CM

## 2019-04-09 DIAGNOSIS — I85.10: ICD-10-CM

## 2019-04-09 DIAGNOSIS — A41.9: ICD-10-CM

## 2019-04-09 DIAGNOSIS — R53.81: Primary | ICD-10-CM

## 2019-04-09 DIAGNOSIS — E86.0: ICD-10-CM

## 2019-04-09 DIAGNOSIS — K64.4: ICD-10-CM

## 2019-04-09 DIAGNOSIS — K62.6: ICD-10-CM

## 2019-04-09 DIAGNOSIS — D62: ICD-10-CM

## 2019-04-09 DIAGNOSIS — K31.89: ICD-10-CM

## 2019-04-09 DIAGNOSIS — Z88.0: ICD-10-CM

## 2019-04-09 NOTE — PROC
[address]  [address]                    PROCEDURE REPORT              
_______________________________________________________________________________
 
Name:       CHRISTY LR                 Room:           [facility] [place]  
..#:  [id number]      Account #:      [id number]  
Admission:  04/09/19     Attend Phys:    Mohsen Tahani, MD    
Discharge:               Date of Birth:  [birth year]  
         Report #: [id number]
                                                                                
_______________________________________________________________________________
THIS REPORT FOR:  //name//                      
 
For GI report, please see the Provation report in Perceptive 7 content.
 
 
 
 
 
 
 
 
 
 
 
 
 
 
 
 
 
 
 
 
 
 
 
 
 
 
 
 
 
 
 
 
 
 
 
 
 
 
 
 
 
                       
                                        By:                                
                 
D: 04/20/19     _______________________________________
T: 04/25/19 0645Medical Records Staff JOSESITO       /AL

## 2019-04-09 NOTE — NUR
PT TO ROOM 333 PER AMBULATORY FROM 313 WITH STAFF ,FAMILY AND ALL BELONGINGS.
PT IS ALERT AND ORIENTATED AND IS ORIENTATED TO ROOM AND CONTROLS. PT ASSISTED
TO BATHROOM AND VOIDS CLEAR YELLOW URINE. PT DID AMBULATE TO BATHROOM WITH
GAITBELT AND WALKER AND MIN ASSIST OF 1.PT DENIES PAIN AT PRESENT.

## 2019-04-09 NOTE — PROC
[address]  [address]                    PROCEDURE REPORT              
_______________________________________________________________________________
 
Name:       CHRISTY LR                 Room:           [facility] [place]  
..#:  [id number]      Account #:      [id number]  
Admission:  04/09/19     Attend Phys:    Mohsen Tahani, MD    
Discharge:               Date of Birth:  [birth year]  
         Report #: [id number]
                                                                                
_______________________________________________________________________________
THIS REPORT FOR:  //name//                      
 
For GI report, Please see the Provation report in Perceptive 7 content.
 
 
 
 
 
 
 
 
 
 
 
 
 
 
 
 
 
 
 
 
 
 
 
 
 
 
 
 
 
 
 
 
 
 
 
 
 
 
 
 
 
                       
                                        By:                                
                 
D: 04/19/19     _______________________________________
T: 04/25/19 0645Medical Records Staff JOSESITO       /AL

## 2019-04-10 VITALS — DIASTOLIC BLOOD PRESSURE: 46 MMHG | SYSTOLIC BLOOD PRESSURE: 91 MMHG

## 2019-04-10 VITALS — DIASTOLIC BLOOD PRESSURE: 41 MMHG | SYSTOLIC BLOOD PRESSURE: 92 MMHG

## 2019-04-10 LAB
ANION GAP SERPL CALC-SCNC: 6 MMOL/L (ref 7–16)
BUN SERPL-MCNC: 41 MG/DL (ref 7–18)
CALCIUM SERPL-MCNC: 8.3 MG/DL (ref 8.5–10.1)
CHLORIDE SERPL-SCNC: 101 MMOL/L (ref 98–107)
CO2 SERPL-SCNC: 28 MMOL/L (ref 21–32)
CREAT SERPL-MCNC: 1 MG/DL (ref 0.6–1.3)
GLUCOSE SERPL-MCNC: 102 MG/DL (ref 70–99)
HCT VFR BLD CALC: 28.5 % (ref 37–47)
HGB BLD-MCNC: 9.2 GM/DL (ref 12–15)
MCH RBC QN AUTO: 26.7 PG (ref 26–34)
MCHC RBC AUTO-ENTMCNC: 32.2 G/DL (ref 28–37)
MCV RBC: 82.9 FL (ref 80–100)
MPV: 8.6 FL. (ref 7.2–11.1)
PLATELET COUNT*: 132 THOU/UL (ref 150–400)
POTASSIUM SERPL-SCNC: 4.8 MMOL/L (ref 3.5–5.1)
RBC # BLD AUTO: 3.44 MIL/UL (ref 4.2–5)
RDW-CV: 22.4 % (ref 10.5–14.5)
SODIUM SERPL-SCNC: 135 MMOL/L (ref 136–145)
WBC # BLD AUTO: 14.8 THOU/UL (ref 4–11)

## 2019-04-10 NOTE — NUR
ASSUMED CARE AT 1920. ALERT AND ORIENTED. PLEASANT. DAUGHTER STAYED OVERNIGHT.
HX OF ASCITES, ENLARGED LIVER AND HAS DISTENDED ABD. DOES HAVE OSTOMY BAG TO
RLQ ABD WITH VERY MINIMAL SEROUS DRAINAGE. GAUZE DRSG X 2 TO MID/LOWER BACK.
BLE EDEMA 2-3+.  TOOK PILLS WHOLE WITHOUT ISSUES. MIN ASSIST WITH GAIT BELT
AND WALKER. UP TO BATHROOM. DOES OWN CARES. WEARS PULLUPS. PT WAS UP EVERY
HOUR OR MORE DURING THE NIGHT TO TOILET OR FOR REPOSTIONING IN BED. DID HAVE
SEVERAL SOFT/LOOSE STOOLS OVERNIGHT. DID NOT SLEEP MUCH BECAUSE OF THIS.
REMINDED PT TO USE CALL LIGHT AND NOT GET UP WITHOUT NURSING STAFF.

## 2019-04-10 NOTE — PATH
[address]  [address]                    PATHOLOGY RPT PROCEDURE       
_______________________________________________________________________________
 
Name:       ANA CEDILLO                 Room:           [address] [place]  
..#:  [id number]      Account #:      [id number]  
Admission:  03/25/19     Date of Birth:  [birth year]  
Discharge:  04/09/19                   Report #:    [id number]
                                                         Path Case #: [id number]
_______________________________________________________________________________
 
[facility] Accession Number: [id number]
.                                                                01
Material submitted:                                        .
PART A: BONE MARROW BIOPSY
PART B: BONE MARROW CLOT
PART C: BONE MARROW ASPIRATE SLIDES
PART D: PERIPHERAL SMEARS
PART E: PERIPHEARL BLD FLOW SEND OUT
.                                                                01
Clinical history:                                          .
This is a 78-year-old woman with leukocytosis, anemia and
thrombocytopenia.
.
Severe anemia with anisopoikilocytosis; macrocytic anemia
.                                                                02
**********************************************************************
Diagnosis:
Bone marrow aspirate, biopsy, cell clot and peripheral blood:
- Peripheral blood with mild leukocytosis / neutrophilia with left-shifted
maturation, severe normocytic anemia and moderate-to-severe
thrombocytopenia.
- HYPERCELLULAR BONE MARROW WITH TRILINEAGE HEMATOPOIESES, MILD TRILINEAGE
DYSPOIESIS, AND INCREASED ATYPICAL AND CLUSTER MEGAKARYOCYTES, CONSISTENT
WITH A MYELOPROLIFERATIVE PROCESS.  (SEE COMMENT)
- Diffuse 4/4+ reticulin fibrosis.
- No stainable iron.
.
(CLW:mml; 04/09/2019)
.
.
Please see included [facility] report [id number].
AZ[facility]/04/10/2019
**********************************************************************
.                                                                02
Comment:
Overall, the bone marrow is hypercellular for the patient's age with
trilineage hematopoiesis, mild trilineage dyspoiesis, and increased,
clustered and focally atypical megakaryocytes without an overall increase
in blasts.  There is diffuse 4/4+ reticulin fibrosis and no stainable
iron.  The findings are consistent with a myeloproliferative process.
The histological differential diagnosis includes myeloproliferative
neoplasms or mixed myelodysplastic syndrome / myeloproliferative neoplasm.
Correlation with clinical history, additional laboratory data and
cytogenetic / FISH studies is required.  The case is discussed with Dr. Karishma Mendez on 4/10/19 at approximately 9:30 AM.
.
(CLW:mml; 04/09/2019)
.                                                                02
 
[place] [address]                    PATHOLOGY RPT PROCEDURE       
_______________________________________________________________________________
 
Name:       ANA CEDILLO                 Room:           [address] [place]  
M.R.#:  [id number]      Account #:      [id number]  
Admission:  03/25/19     Date of Birth:  [birth year]  
Discharge:  04/09/19                   Report #:    [id number]
                                                         Path Case #: [id number]
_______________________________________________________________________________
Electronically signed:                                     .
Regine Hicks MD, Pathologist
NPI- [id number]
.                                                                01
Gross description:                                         .
A.  The specimen is received in formalin, labeled "Ana Cedillo, core
biopsy".  Received are two needle cores of light tan bone measuring 0.3
and 0.5 cm in length, with each measuring 0.3 cm in diameter.  The
specimen is submitted entirely in cassette A1, following light
decalcification.
.
B.  The specimen is received in formalin, labeled "Ana Mosesel, clot".
Received is blood coagulum measuring 2.0 x 2.0 x 1.7 cm in greatest
dimensions.  The specimen is submitted entirely in cassettes B1 through
B3.
(CAA; 4/5/2019)
QAC/QAC
.                                                                02
Microscopic:                                                    .
CBC Data (4/5/2019):  WBC 12,200 /uL, RBC 2.77, hemoglobin 7.6 g/dL,
hematocrit 23.6%, MCV 85.4 fL, MCH 27.6 pg, MCHC 32.3 g/dL, RDW 23.3%, and
platelet count 63,000 /uL.  Manual white blood cell differential:  segs
73%, bands 3%, lymphs 9%, monos 8%, eos 2%, metas 3%, myelos 2%.
.
Peripheral Blood Smear:
Cytomorphological examination of the Veliz's stained peripheral blood
smear confirms the provided data.  Red blood cells show severe normocytic
anemia with moderate anisopoikilocytosis.  Scattered elliptocytes and
dacryocytes (pointed RBCs, teardrop cells) are noted.  While a rare
schistocyte / fragmented RBC may be noted on scanning, there is not a
significant number of schistocytes or microspherocytes.  White blood cells
are mildly increased in number.  They are predominantly segmented
neutrophils with left-shifted maturation.  A rare blast without Reymundo rods
is noted on scanning.  Lymphocytes are predominantly small, round, and
mature appearing with condensed chromatin and scant cytoplasm with admixed
large granular lymphocytes.  Monocytes are mature.  Platelets are markedly
decreased in number and mainly normal in morphology with rare larger
platelets noted.
.
Aspirate Smears:
Cytomorphological examination of the Veliz's stained aspirate smears show
no intact spicules present.  Scattered hematopoietic progenitor cells
are identified; therefore, this most likely represents a hemodilute
sample.  Cytomorphological examination of the Veliz's stained touch
imprints show hematopoietic  progenitor cells and megakaryocytes present.
The myeloid to erythroid ratio is 2:1.  Full myeloid maturation is
identified and is mildly dyspoietic with abnormal nuclear lobation and
cytoplasmic granularity.  Erythroid maturation is mildly dyserythropoietic
with irregular nuclear contours, left-shifted maturation and nuclear
 
[facility] 
[address] [place] [address]                    PATHOLOGY RPT PROCEDURE       
_______________________________________________________________________________
 
Name:       ANA CEDILLO                 Room:           [address] [place]  
..#:  [id number]      Account #:      [id number]  
Admission:  03/25/19     Date of Birth:  [birth year]  
Discharge:  04/09/19                   Report #:    [id number]
                                                         Path Case #: [id number]
_______________________________________________________________________________
cytoplasmic dyssynchrony.  In a 500 cell differential, there are 1% blasts
(no Reymundo rods are seen), 55% more differentiated myeloids, 32% erythroid
precursors, 11% lymphocytes and 1% plasma cells.  Megakaryocytes are
proportional in number and both normal and abnormal in morphology with
variable sizes and nuclear abnormalities.  Both micromegakaryocytes and
atypical megakaryocytes are noted.  No lymphoid aggregates or markedly
atypical lymphoid cells are seen.  Plasma cells are without atypia.  Iron
stain of the aspirate smear shows 0/4+ iron positivity.  It is
predominantly blood and peripheral blood elements with no intact spicules
present.  No ringed sideroblasts are identified.
.
Core Biopsy and Cell Clot:
The decalcified bone marrow core biopsy is small, but adequate.  The bone
marrow is hypercellular with an overall cellularity approaching 100%.
There is a diffusely fibrotic interstitium.  The myeloid to erythroid
ratio appears 2:1.  Myeloid and erythroid maturation are mildly
dyspoietic.  Megakaryocytes are increased in number, clustered and focally
atypical.  No lymphoid aggregates or markedly atypical lymphoid cells are
seen.  Bony trabeculae are fragmented.  Blood vessels are unremarkable.
The cell clot is predominantly blood and peripheral blood elements with no
intact spicules present.  Properly-controlled special stains are
performed.
.
Block A1:
Iron:  0/4+ iron positivity (no stainable iron)
Reticulin:  Diffuse 4/4+ reticulin fibrosis
Iron (block B1, B2, B3):  0/4+ iron positivity (no stainable iron);
predominantly blood and peripheral blood elements.
.
Due to the hemodilute nature of the aspirate specimen, and to identify
cells in a tissue architectural context, properly-controlled
immunohistochemical stains are performed.
.
Block A1:
CD34:  no increase in blasts
:  stains occasional cells, no increase in blasts
Myeloperoxidase:  confirms the M:E ratio
Glycophorin A:  confirms the M:E ratio
.
Flow Cytometry:
Flow cytometric immunophenotypic analysis was performed at [facility].  The diagnosis is "relatively increased neutrophilic cells with
a left shift (nonspecific), no overt increase in blasts."  There are 5%
lymphocytes.  Of the lymphocytes, there are 0.7% polyclonal B-cells.
T-cells have a CD4/CD8 ratio of 1.6 and no aberrant T-cell antigen
expression.  There are 0.2% blasts.  Please see separate flow cytometry
report from [facility] ([id number]).
.
Cytogenetics Analysis:
 
[address]  [address]                    PATHOLOGY RPT PROCEDURE       
_______________________________________________________________________________
 
Name:       ANA CEDILLO                 Room:           M.313-P    DIS [place]  
M.R.#:  [id number]      Account #:      [id number]  
Admission:  03/25/19     Date of Birth:  [birth year]  
Discharge:  04/09/19                   Report #:    [id number]
                                                         Path Case #: [id number]
_______________________________________________________________________________
Cytogenetic chromosomal analysis is pending at [facility]
([id number]).  FISH analysis is pending at [facility]
([id number]).
.
(CLW:mml; 04/09/2019)
.
Special studies [place] [facility] [address] [place] [address], [address], [place], [address], on case [id number],
labeled with their number [id number], dated 04/07/2019.
.
Flow Cytometry:  Hematologic Neoplasia Assessment
.
Clinical History
Pneumonia, sepsis, leukocytosis; rule out CML
.
Indication for Study
Evaluation for chronic lymphocytic leukemia
.
Specimen
Peripheral Blood
.
Viability
84% (7AAD exclusion)
.
Interpretation
Peripheral Blood:
  - Relatively increased neutrophilic cells with a left shift
    (non-specific), no overt increase in blasts
.
Comments
Correlation with available clinical, laboratory, and morphologic data is
recommended. Cytogenetic analysis, BCR-ABL testing, and/or JAK2 testing
are being ordered.
.
Populations Analyzed
Myeloid Blasts:  0.2%  No significant blast population detected
Lymphocytes:       5%  B-cells:  0.7%, polytypic/polyclonal sIg light
                       chain pattern
                       T-cells:  no significant abnormalities of the
                       markers tested
                       CD4+ T-cells:  1.6% (including 0.1% CD57+ cells)
                       CD8+ T-cells:  1.0% (including 0.5% CD57+ cells)
                       CD4:CD8:  1.6
                       NK cells:  1.2%
Neutrophils:           88%  Relatively increased; immunophenotypic
                       features consistent with left-shift (decreased
                       CD16, CD10, and CD11b)
 
[place] [address]                    PATHOLOGY RPT PROCEDURE       
_______________________________________________________________________________
 
Name:       ANA CEDILLO                 Room:           M.313-P    DIS [place]  
M.R.#:  [id number]      Account #:      [id number]  
Admission:  03/25/19     Date of Birth:  [birth year]  
Discharge:  04/09/19                   Report #:    [id number]
                                                         Path Case #: [id number]
_______________________________________________________________________________
Monocytes:         3%  No significant abnormalities of the markers tested
Eosinophils:       3%  No relative increase
Basophils:       0.6%  No relative increase
.
Morphologic Evaluation
A slide was reviewed for  purposes only.
.
Specimen Description
Total cell yield: 12.40 x 10 and 6
.
Reagent(s) Used
CD2, CD3, CD4, CD5, CD7, CD8, CD10, CD11b, CD13, CD14, CD16, CD19, CD20,
CD33, CD34, CD38, CD45, CD56, CD57, CD64, , HLA-DR, kappa, lambda
.
Electronically Signed by Kayce Arguelles MD at 10:19PM MST on 04/06/2019
at [facility].
Kayce Arguelles MD
Pathologist
.
Intended Use
Flow cytometry is optimally used to immunophenotypically characterize
abnormal populations when they are detected. Negative flow cytometry
results do not exclude lymphoma or neoplasia. Possible false negative flow
cytometry results may occur in, but are not limited to, the following:
neoplastic cells in Hodgkin lymphoma are not typically adequately
represented by routine clinical flow cytometry; neoplastic cells may be
lost or inadequately represented due to degeneration, sample processing,
sampling artifact, or patchy involvement; plasma cells are typically
underrepresented by flow cytometry; immature cells/blasts may be
underrepresented due to hemodilution; myeloproliferative disorders and low
grade myelodysplasia may not have immunophenotypic abnormalities or
increased blasts. Correlation with all available clinical, laboratory, and
morphologic data is always necessary to assess for the possibility of
false negative flow cytometry results and to establish a diagnosis.
Each marker in this analysis was used to assess for potential antigenic
abnormalities or to evaluate detected abnormalities.
.
Disclaimer(s)
This test was performed at [facility]. at [address], [place], [address] - Medical Director: Hakeem Webster MD.
[facility] is a business unit of [employer] a wholly-owned subsidiary of [employer].
.
Any image or images that accompany this report are representative images
only and should not be used to render a diagnosis.
.
This test was developed and its performance characteristics determined by
 
[place] [address]                    PATHOLOGY RPT PROCEDURE       
_______________________________________________________________________________
 
Name:       ANA CEDILLO                 Room:           [address] [place]  
[facility]#:  [id number]      Account #:      [id number]  
Admission:  03/25/19     Date of Birth:  [birth year]  
Discharge:  04/09/19                   Report #:    [id number]
                                                         Path Case #: [id number]
_______________________________________________________________________________
[facility]. It has not been cleared or approved by the Food and
Drug Administration (FDA). The FDA has determined that such clearance or
approval is not necessary.
.
For inquiries, the physician may contact Lab: [phone]
.
A complete copy of the report is on file.
.
Professional services performed by [facility] at [address], [place] [address].  Technical services performed by
[facility] [place]. at [address], [place]
[address].
.
([facility] 04/08/2019)
.
.                                                                02
Pathologist provided ICD-10:
D72.829, D64.9, D69.6, C96.9, D47.1
.                                                                02
CPT                                                        .
787052, 373440, 550257, 823509, 728308, 376370, 803572, H14925, D76996,
865360, 516250, 593839, 743490
Specimen Comment: A courtesy copy of this report has been sent to
Specimen Comment: [phone].
Specimen Comment: Report sent to 
***Performed at:  01
   [facility]
   [address]  [id number]
   MD Dez Lopez MD Phone:  [phone]
***Performed at:  02
   [facility]
   [address]  [id number]
   MD Spencer Kerley MD Phone:  [phone]

## 2019-04-10 NOTE — NUR
SW met with pt and pt  and pt dtr to complete initial assessment,
introduce self, and SW role.  Pt alert, oriented, resting in bed.  Pt lives at
home with , 3 dtrs and a grandson. Pt has needed DME. Pt goal to be at
least at prior level of functioning, as independent as possible.  SW reviewed
team conference summary and plan for team to reassess pt length of stay during
team conference next Wednesday. Pt and pt family in agreement with plan.  SW
to continue to follow to assist with safe dc planning.

## 2019-04-11 VITALS — SYSTOLIC BLOOD PRESSURE: 97 MMHG | DIASTOLIC BLOOD PRESSURE: 44 MMHG

## 2019-04-11 VITALS — DIASTOLIC BLOOD PRESSURE: 47 MMHG | SYSTOLIC BLOOD PRESSURE: 97 MMHG

## 2019-04-11 NOTE — NUR
pt has voided and reports feeling she has voided well. bladder scan done with
no residual indicated on scanner.pt reports having back pain with tylenol
given. pt rests in bed now with daughter massageing her back. pt remains alert
and orientated.

## 2019-04-11 NOTE — NUR
pt calls for assist to bathroom and is able to wipe self but requests assist
with pants up and down due to fatigue. pt has been continent of B+B
visits with  and 2 daughters.prn for back pain given this afternoon
with fair effect. pt comes to sitting on bed with assist then able to stand
and walk well with walker,gaitbelt and min assist of 1. pt remains alert and
orientated.hourly rounding continues.

## 2019-04-11 NOTE — CON
[address]  [address]                    CONSULTATION                  
_______________________________________________________________________________
 
Name:       CHRISTY LR                 Room:           M.313-P    DIS [place]  
M.R.#:  [id number]      Account #:      [id number]  
Admission:  03/25/19     Attend Phys:    ILDA Marmolejo
Discharge:  04/09/19     Date of Birth:  [birth year]  
         Report #: [id number]
                                                                     [id number]  
_______________________________________________________________________________
THIS REPORT FOR:  //name//                      
 
CC: Janice Ibrahim
    Dung Chavarria
 
DATE OF SERVICE:  03/31/2019
 
 
NEPHROLOGY CONSULTATION
 
CONSULTING PHYSICIAN:  Dr. Ruiz
 
REASON FOR NEPHROLOGY CONSULTATION:  Hypernatremia.
 
REASON FOR ADMISSION:  Rectal bleeding.
 
HISTORY OF PRESENT ILLNESS:  This is a 78-year-old female with history of
leukemia, enlarged spleen, enlarged liver, prolapsed uterus and bladder who came
in because she was having rectal bleeding.  GI evaluated her.  Based upon a CT
scan, she was found to have right-sided colitis.  She was also diagnosed with
cirrhosis in this admission and autoimmune serologies are being checked.  There
was also evidence of possible CML and Hematology was also consulted to see her. 
She underwent a colonoscopy, which showed hemorrhoids and a polyp and some
evidence of colitis.  She also had to be intubated for some time because of
acute hypoxic respiratory failure when she did have bilateral pleural effusions
and she was also diuresed with Lasix.  She was found to have an ejection
fraction of 35%.  Currently, her x-ray is showing only right-sided pleural
effusion and she is actually extubated.  The patient has not developed
hyponatremia for the past 3 days, has been having rising sodium and sodium is
148 today, hence Nephrology has been consulted.  She is also currently on TPN. 
She is also being treated for pneumonia.  She also has ascites, which is
secondary to her portal hypertension according to GI.  Currently, she is awake
and alert and eating ice chips.
 
ALLERGIES:  PENICILLIN, SULFA AND CODEINE.
 
REVIEW OF SYSTEMS:  As mentioned in history of present illness, otherwise
negative.
 
PAST MEDICAL AND SURGICAL HISTORY:  Include appendicitis, tonsillectomy,
leukemia, large spleen, congestive heart failure, thyroid disease and enlarged
liver and prolapsed uterus and bladder.
 
HOME MEDICATIONS:  Include levothyroxine only.
 
FAMILY HISTORY:  Not pertinent due to her advanced age.
 
 
 
[place] [address]                    CONSULTATION                  
_______________________________________________________________________________
 
Name:       CHRISTY LR                 Room:           M.313-P    DIS IN  
M.R.#:  [id number]      Account #:      [id number]  
Admission:  03/25/19     Attend Phys:    ILDA Marmolejo
Discharge:  04/09/19     Date of Birth:  [birth year]  
         Report #: [id number]
                                                                     [id number]  
_______________________________________________________________________________
 
SOCIAL HISTORY:  According to the patient's record, she does not use alcohol or
smoke or take recreational drugs.
 
PHYSICAL EXAMINATION:
VITAL SIGNS:  Blood pressure is 106/47, temperature is 36.4, pulse rate 78,
respiratory rate is 22 and pulse ox is 98% and she is on 3 liters of oxygen by
nasal cannula.
GENERAL:  She is awake, alert, oriented x 3.
HEAD, EYES, EARS, NOSE AND THROAT:  Mucous membranes are slightly dry.
NECK:  There is no JVD.
CHEST:  Bilaterally clear to auscultation, no crackles or wheezing.
CARDIOVASCULAR:  S1, S2 normal.  No murmurs.
ABDOMEN:  Distended.  Umbilicus is flat.  Otherwise, belly is nontender and
soft.  Bowel sounds are diminished.
EXTREMITIES:  There is no lower extremity edema.
NEUROLOGICAL FUNCTION:  Gross neurological function seems to be intact.
PSYCHIATRIC:  Mood seems to be normal.
 
LABORATORY DATA:  Her white count is 21.4; hemoglobin 7.7; platelet count is
34,000.  Sodium is 148, BUN is 37, creatinine is 0.9.  Her AST was 11, ALT was
11 and alkaline phosphatase are 33.  Other labs are reviewed.
 
IMAGING:  Chest x-ray, chest CT, liver reviewed.
 
ASSESSMENT:
1.  Hypernatremia in the setting of diuretic use.
2.  Acute blood loss anemia secondary to gastrointestinal bleed.  Colonoscopy
showed hemorrhoids, also underwent endoscopy with banding on 03/25/2019.
3.  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
4.  Ascites with hepatosplenomegaly secondary to portal hypertension secondary
to cirrhosis.
5.  Pneumonia, on antibiotics as per primary.  She is still on Levophed, septic
shock.
6.  Thrombocytopenia secondary to splenomegaly.
7.  Chronic congestive heart failure, systolic dysfunction, ejection fraction of
35%.
 
PLAN:
1.  The patient currently looks euvolemic and in fact slightly hypovolemic.  I
would hold off on the Lasix today and Lasix can be used on an as needed basis
and we will give her 1 liter of D5 water.  Sodium has been decreased and TPN
already, can continue TPN for now.
2.  Kidney function remains normal.
3.  Try to maintain a good mean arterial pressure of over 70.
 
 
 
 
[address]  [address]                    CONSULTATION                  
_______________________________________________________________________________
 
Name:       CHRISTY LR                 Room:           [address] [place]  
TRENA#:  [id number]      Account #:      [id number]  
Admission:  03/25/19     Attend Phys:    ILDA Marmolejo
Discharge:  04/09/19     Date of Birth:  [birth year]  
         Report #: [id number]
                                                                     [id number]  
_______________________________________________________________________________
Thank you for this consultation.  We will continue to follow along with you and
discuss the plan with the patient as well as the patient's nurse.
 
 
 
 
 
 
 
 
 
 
 
 
 
 
 
 
 
 
 
 
 
 
 
 
 
 
 
 
 
 
 
 
 
 
 
 
 
 
 
 
 
 
<ELECTRONICALLY SIGNED>
                                        By:  Muna Mennedez MD            
04/11/19     1242
D: 03/31/19 0845_______________________________________
T: 04/01/19 0037Amehreen Menendez MD               /nt

## 2019-04-11 NOTE — NUR
has returned call and states he is still awaiting for some of
pathology to be completed but plans to have dr.osgood visit pt on saturday to
discuss further plans.pt alert and orientated .pt weak but ambulates with
walker and gaitbelt and sba to bathroom. pt has had loose bm this am and voids
well. abdomen appears round,scant drainage in bag from old drain site.

## 2019-04-11 NOTE — NUR
ASSUMED CARE AT 1920. ALERT AND ORIENTED. PLEASANT. MIN ASSIST WITH GAIT BELT
AND WALKER. VERY WEAK. UP TO BATHROOM. DOES OWN CARES. WEARS PULLUPS. OSTOMY
DRAIN BAG INTACT TO RLQ ABD WITH VERY MINIMAL SEROUS DRAINAGE. BLE EDEMA 2+.
DID HAVE SMALL NOSEBLEED THIS AM.  WAS UP EVERY HOUR DURING THE NIGHT TO
TOILET. VERY LITTLE SLEEP. DAUGHTER SLEPT OVERNIGHT. CALL LIGHT IN REACH AND
BED ALARM ON.

## 2019-04-12 VITALS — SYSTOLIC BLOOD PRESSURE: 92 MMHG | DIASTOLIC BLOOD PRESSURE: 45 MMHG

## 2019-04-12 NOTE — NUR
ASSUMED CARE AT 1930. PATIENT RESTING IN RECLINER. WENT TO BED AROUND 2030. UP
WITH SBA, GAIT BELT, WALKER. NEEDS A LITTLE HELP GOING FROM LYING TO SITTING
POSITION, BUT ABLE TO RISE AFTER THAT. VOIDS HOURLY PER TOILET. DOES OWN
HYGIENE BUT STATES UNABLE TO PULL BRIEFS UP OR DOWN. ASSISTED WITH TURNS.
MEDICATED FOR BACK PAIN WITH APAP, SEE MAR. OSTOMY BAG TO PERICENTESIS SITE
DRY. HELD COLACE FOR LOOSE STOOLS, IS ON LACTULOSE. DSSGS TO BACK C/D/I.
DAUGHTER SPENDING THE NIGHT. HOURLY ROUNDS CONTINUE. CALL LITE IN REACH, USED
APPROPRIATELY.

## 2019-04-12 NOTE — NUR
C/O BACK PAIN, LIDOCAINE PATCH AND HEATING PAD. NO FURTHER COMPLAINTS. UP TO
BATH ROOM. WITH ROLLING WALKER

## 2019-04-12 NOTE — NUR
SLEPT BETWEEN VOIDS. HAD LARGE BM THIS MORNING, HAD A FEW SMALL ONES EARLIER.
MEDICATED FOR BACK PAIN. DRESSINGS TO BACK C/D/I. NO DRAINAGE TO RLQ OSTOMY
BAG FROM PARACENTESIS SITE. DAUGHTER REMAINS IN ROOM, BUT DOES NOT HELP WITH
CARES. TURNS SELF IN BED, TAKES PILLS IN RECLINER. HOURLY ROUNDS CONTINUE. BED
ALARM ON. CALL LITE IN REACH.

## 2019-04-13 VITALS — DIASTOLIC BLOOD PRESSURE: 42 MMHG | SYSTOLIC BLOOD PRESSURE: 105 MMHG

## 2019-04-13 VITALS — SYSTOLIC BLOOD PRESSURE: 83 MMHG | DIASTOLIC BLOOD PRESSURE: 38 MMHG

## 2019-04-13 NOTE — NUR
ASSUMED PT CARE AT 1930. PT ALERT AND ORIENTED X4, POLITE AND COOPERATIVE WITH
CARES.  PT TAKES PILLS WHOLE WITH WATER WITHOUT DIFFICULTY. PT UP NUMEROUS
TIMES OVERNIGHT TO VOID WITH SBA, GAIT BELT AND WALKER. PT DOES OWN PERICARE
BUT NEEDS ASSIST WITH TAKING BRIEFS UP AND DOWN.  PT DAUGHTER IN ROOM
OVERNIGHT ASSISTING WITH PT GAITBELT AND SLIPPER SOCKS. PRN TYLENOL TWICE THIS
SHIFT FOR BACK PAIN.  PT REFUSED TRAMADOL.  PT ALSO REFUSED NEWLY ORDERED
LOVENOX CITING RECENT GI BLEED.  PT ABDOMEN DISTENDED FROM ASCITES.  PT CAN
TURN SELF IN BED.  USES CALL LIGHT APPROPRIATELY.  CALL LIGHT AND FREQUENTLY
USED ITEMS WITHIN REACH.  HOURLY ROUNDING IN PROGRESS, WILL CONTINUE TO
MONITOR.

## 2019-04-13 NOTE — NUR
PT CARE ASSUMED THIS AM, ASSESSMENT AND VITAL SIGNS COMPLETED AS DOCUMENTED.
PT HAS PARTICIPATED WITH ALL THERAPIES TODAY AND CONTINUES TO PROGRESS TOWARD
DISCHARGE GOALS. PT'S FAMILY IS HERE MOST OF THE TIME AND THEY ARE VERY
SUPPORTIVE. PT AND FAMILY EDUCATED ON THE IMPORTANCE OF PT DOING MORE FOR
HERSELF. FALL PRECAUTIONS AND HOURLY ROUNDING CONTINUE.

## 2019-04-14 VITALS — DIASTOLIC BLOOD PRESSURE: 40 MMHG | SYSTOLIC BLOOD PRESSURE: 90 MMHG

## 2019-04-14 VITALS — DIASTOLIC BLOOD PRESSURE: 51 MMHG | SYSTOLIC BLOOD PRESSURE: 92 MMHG

## 2019-04-14 NOTE — NUR
ASSUMED PT CARE AT 1930. PT ALERT AND ORIENTED X4, POLITE AND COOPERATIVE WITH
CARES. PT TAKES PILLS WHOLE WITH WATER WITHOUT DIFFICULTY.  PT UP NUMEROUS
TIMES OVERNIGHT TO VOID WITH SBA, GAIT BELT AND WALKER.  PT DOES OWN PERICARE
BUT NEEDS ASSIST WITH TAKING BRIEFS UP AND DOWN.  PT DAUGHTER IN ROOM
OVERNIGHT, ASSISTED WITH CARES.  PT GIVEN ONE TIME DOSE OF KETORALAC FOR BACK
PAIN WITH MODERATE RELIEF.  PT ABDOMEN REMAINS DISTENDED.  PT CAN TURN SELF IN
BED.  USES CALL LIGHT APPROPRIATELY.  CALL LIGHT AND FREQUENTLY USED ITEMS
WITHIN REACH.  HOURLY ROUNDING IN PROGRESS, WILL CONTINUE TO MONITOR.

## 2019-04-14 NOTE — NUR
PT IS UP WITH MIN ASSIST OF ONE, GAIT BELT AND WALKER, GOES TO THE BATHROOM
APPROXIMATELY EVERY 45 MINUTES. PT CONTINUES TO C/O BACK PAIN BUT IS NOW
REFUSING TYLENOL DUE TO POTENTIAL HARM TO HER LIVER SO SHE NOW HAS AN ORDER
FOR PRN FLEXERIL. PT STATES THE DOSE SHE TOOK MAY HAVE HELPED. PT IS VERY GOOD
AT DELEGATING BUT NEEDS TO BE ENCOURAGED TO DO MORE FOR HERSELF, FAMILY STATES
THEY UNDERSTAND AND AGREE SHE COULD DO MORE FOR HERSELF. PT HAS BEEN CONTINENT
OF BOWEL AND BLADDER AND IS ABLE TO DO HER CAMILLE CARE AND CLOTHING ADJUSTMENTS
WITH MIN ASSIST. PT TAKES HER PILLS WITHOUT DIFFICULTY AND HAS A GOOD
APPETITE. NO ACUTE DISTRESS, HOURLY ROUNDING AND FALL PRECAUTIONS CONTINUE.

## 2019-04-15 VITALS — SYSTOLIC BLOOD PRESSURE: 98 MMHG | DIASTOLIC BLOOD PRESSURE: 48 MMHG

## 2019-04-15 VITALS — SYSTOLIC BLOOD PRESSURE: 92 MMHG | DIASTOLIC BLOOD PRESSURE: 52 MMHG

## 2019-04-15 LAB
ALBUMIN SERPL-MCNC: 3.1 G/DL (ref 3.4–5)
ALP SERPL-CCNC: 46 U/L (ref 46–116)
ALT SERPL-CCNC: 17 U/L (ref 30–65)
ANION GAP SERPL CALC-SCNC: 8 MMOL/L (ref 7–16)
AST SERPL-CCNC: 13 U/L (ref 15–37)
BILIRUB SERPL-MCNC: 0.4 MG/DL
BUN SERPL-MCNC: 56 MG/DL (ref 7–18)
CALCIUM SERPL-MCNC: 8.7 MG/DL (ref 8.5–10.1)
CHLORIDE SERPL-SCNC: 104 MMOL/L (ref 98–107)
CO2 SERPL-SCNC: 27 MMOL/L (ref 21–32)
CREAT SERPL-MCNC: 1.1 MG/DL (ref 0.6–1.3)
GLUCOSE SERPL-MCNC: 109 MG/DL (ref 70–99)
HCT VFR BLD CALC: 25.7 % (ref 37–47)
HGB BLD-MCNC: 8.3 GM/DL (ref 12–15)
MAGNESIUM SERPL-MCNC: 2.6 MG/DL (ref 1.8–2.4)
MCH RBC QN AUTO: 26.4 PG (ref 26–34)
MCHC RBC AUTO-ENTMCNC: 32.3 G/DL (ref 28–37)
MCV RBC: 81.8 FL (ref 80–100)
MPV: 8.5 FL. (ref 7.2–11.1)
PLATELET COUNT*: 122 THOU/UL (ref 150–400)
POTASSIUM SERPL-SCNC: 4.4 MMOL/L (ref 3.5–5.1)
PROT SERPL-MCNC: 6.1 G/DL (ref 6.4–8.2)
RBC # BLD AUTO: 3.15 MIL/UL (ref 4.2–5)
RDW-CV: 21.8 % (ref 10.5–14.5)
SODIUM SERPL-SCNC: 139 MMOL/L (ref 136–145)
WBC # BLD AUTO: 9.2 THOU/UL (ref 4–11)

## 2019-04-15 NOTE — NUR
ASSUMED CARE AT 1930. PATIENT WENT FROM RECLINER TO TOILET TO BED. PATIENT UP
WITH SBA, GAIT BELT, WALKER. NEEDS ENCOURAGEMENT TO GET TO SITTING POSITION BY
HERSELF, BUT CAN DO SO. HAD VERY LARGE SOFT LIGHT BROWN STOOL PER TOILET AT
2215. DECLINED HS COLACE. DOES OWN HYGIENE AND CAN DO CLOTHING ADJUSTMENTS.
C/O BACK PAIN, MOVES SLOWLY, BUT COMPLETES TASKS. TAKES PILLS ONE AT A TIME
WITH WATER. DAUGHTER SPENDING THE NIGHT. PATIENT LIES ON HER SIDE THROUGH THE
NIGHT. HAS VOIDED SEVEN TIMES THUS FAR THIS SHIFT. MEDICATED WITH FLEXARIL FOR
BACK PAIN. HAD ONE NOSE BLEED AROUND 2100, SCANT AMOUNT SEEPAGE NOTED. GIVEN
COLD WASHCLOTH FOR COMFORT. HOURLY ROUNDS CONTINUE. REFUSES BED ALARM. CALL
LITE IN REACH.

## 2019-04-15 NOTE — NUR
ASSUMMED CARE OF PT AT 0730, PT ALERT AND ORIENTED, TRANSFERS WITH SBA, GB
WALKER, PT AMBULATES TO BATHROOM, VOIDS FREQUENTLY, NO STOOL THIS SHIFT, PT
COMPLAINS OF BACK PAIN, MEDICATED WITH FLEXIRIL EVERY 6 HOURS, LIDOCAINE
PATCH, USES HEATING PAD, PT HAD BLOODY NOSE X1 THIS AM, LARGE SOFT BM X 1 THIS
SHIFT, PT PARTICIPATED IN ALL THERAPIES, PT HAS HAD MANY VISITORS THIS SHIFT,
HOURLY ROUNDING COMPLETED, ASSESSMENT COMPLETE, WILL CONTINUE TO MONITOR.

## 2019-04-15 NOTE — NUR
ASSUMED CARE @ 1922-4/14-SUNDAY.AWAKE IN BED VISITING W/ONE SON & 4 DAUGHTERS.
WANTS BATHROOM LIGHT ON ALL NIGHT.REFUSED HS DOSE COLACE & LOVENOX.WEARS PULL
UPS.CHANGED ONCE. K PAD ON RECLINER FOR USE WHEN PT SITS ON RECLINER SOMETIMES
AFTER BRP.DAUGHTER CHRISTY STAYING ALL NIGHT.SLEPT EARLY @ 2025.TOOK VANILLA
PUDDING X2 DURING NIGHT.PRN FLEXERIL 5 MG ORAL GIVEN @ 0323.DAUGHTER REQUESTED
VOLTAREN CREAM @ 0444 & APPLIED TO LOWER BACK.BRP W/ SBA X14 DURING NIGHT.BUT
IN BETWEEN SLEPT X 1 HOUR X2.SLEPT FOR ONE HOUR & 10 MINUTES X 1.SLEPT FOR ONE
HOUR & 30 MINUTES X 1.

## 2019-04-16 VITALS — SYSTOLIC BLOOD PRESSURE: 93 MMHG | DIASTOLIC BLOOD PRESSURE: 52 MMHG

## 2019-04-16 VITALS — DIASTOLIC BLOOD PRESSURE: 46 MMHG | SYSTOLIC BLOOD PRESSURE: 89 MMHG

## 2019-04-16 LAB
ALBUMIN SERPL-MCNC: 3.3 G/DL (ref 3.4–5)
ALP SERPL-CCNC: 46 U/L (ref 46–116)
ALT SERPL-CCNC: 20 U/L (ref 30–65)
AMMONIA PLAS-SCNC: 21 UMOL/L (ref 11–32)
ANION GAP SERPL CALC-SCNC: 9 MMOL/L (ref 7–16)
AST SERPL-CCNC: 12 U/L (ref 15–37)
BILIRUB SERPL-MCNC: 0.4 MG/DL
BUN SERPL-MCNC: 55 MG/DL (ref 7–18)
CALCIUM SERPL-MCNC: 8.5 MG/DL (ref 8.5–10.1)
CHLORIDE SERPL-SCNC: 102 MMOL/L (ref 98–107)
CO2 SERPL-SCNC: 25 MMOL/L (ref 21–32)
CREAT SERPL-MCNC: 1.1 MG/DL (ref 0.6–1.3)
GLUCOSE SERPL-MCNC: 126 MG/DL (ref 70–99)
HCT VFR BLD CALC: 27.4 % (ref 37–47)
HGB BLD-MCNC: 8.8 GM/DL (ref 12–15)
MAGNESIUM SERPL-MCNC: 2.6 MG/DL (ref 1.8–2.4)
MCH RBC QN AUTO: 26.6 PG (ref 26–34)
MCHC RBC AUTO-ENTMCNC: 32.1 G/DL (ref 28–37)
MCV RBC: 82.8 FL (ref 80–100)
MPV: 8.1 FL. (ref 7.2–11.1)
PLATELET COUNT*: 119 THOU/UL (ref 150–400)
POTASSIUM SERPL-SCNC: 4.6 MMOL/L (ref 3.5–5.1)
PROT SERPL-MCNC: 6.4 G/DL (ref 6.4–8.2)
RBC # BLD AUTO: 3.3 MIL/UL (ref 4.2–5)
RDW-CV: 21.3 % (ref 10.5–14.5)
SODIUM SERPL-SCNC: 136 MMOL/L (ref 136–145)
WBC # BLD AUTO: 9.8 THOU/UL (ref 4–11)

## 2019-04-16 NOTE — NUR
pt has participated with therapies and calls for assist as needs. pt has had
increased back pain with pain medication given with fair effect. pt calls for
assist to bathroom with min assist and walker.pt remains continent of b+b.pt
eats meals in dinningroom. daughters here. pt remains alert and
orientated.hourly rounding continues.

## 2019-04-16 NOTE — NUR
UP TO VOID 12 TIMES THUS FAR THIS SHIFT. SEE TOILETING INTERVENTION. TRYING TO
SLEEP BETWEEN VOIDS. PREFERS TO LIE ON LT SIDE. NEEDING MORE LIFTING ASSIST AS
SHIFT PROGRESSES, STATES HAS PAIN IN BACK AS SHE IS RISING. ONCE STANDING CAN
AMBULATE WITH WALKER, BUT NEEDS ASSIST IN STEERING WALKER, WHICH SHE STATES IS
BECAUSE OF THE PAIN SHE CANNOT STEER IT WELL. DOES OWN HYGIENE, ADJUSTING
PULLUP. HAD ANOTHER HUGE SOFT BM PER TOILET AT 0046 AND ANOTHER SMALL ONE AT
0500. STATES THAT HER ABD FEELS MUCH BETTER. BLOOD AMMONIA LEVEL 21 (WNL).
HOURLY ROUNDS CONTINUE. REFUSES BED ALARM BUT IS COMPLIANT WITH USING CALL
LITE. CALL LITE IN REACH.

## 2019-04-17 VITALS — DIASTOLIC BLOOD PRESSURE: 62 MMHG | SYSTOLIC BLOOD PRESSURE: 103 MMHG

## 2019-04-17 VITALS — SYSTOLIC BLOOD PRESSURE: 105 MMHG | DIASTOLIC BLOOD PRESSURE: 48 MMHG

## 2019-04-17 NOTE — NUR
PT HAS HAD EPISODE OF SMALL EMISIS WITH NAUSEA THIS EVENING. ZOFRAN GIVEN
EARLIER WITH FAIR EFFECT.PT TO BE NPO IN AM FOR ULTRASOUND. PT CONTINUES TO GO
TO BATHROOM TO VOID AND HAS HAD SEVERAL LIQUID BM'S TODAY. PT ATE SOME
BREAKFAST BUT HAS REFUSED LUNCH AND SUPPER. PT FAMILY AT SIDE AND ARE
HELPFULL. PT CONTINENT OF B+B AND AMBULATES WITH MIN ASSIST OF 1. PT REMAINS
ALERT AND ORIENTATED. HOURLY ROUNDING CONTINUES.

## 2019-04-17 NOTE — NUR
ASSUMED PT CARE AT 1930.  PT SLEEPING AT SHIFT CHANGE, AWAKENED FOR
ASSESSMENT THEN ALERT AND ORIENTED X4, P0LITE AND COOPERATIVE WITH CARES.  PT
UP WITH SBA, GAIT BELT AND WALKER TO BATHROOM TO VOID.  PT SLEPT WELL AND
FOR LONGER PERIODS IN BETWEEN TRIPS TO THE BATHROOM.  TRAMADOL ONCE THIS SHIFT
FOR BACK PAIN.  PT HAD ONE SMALL NOSEBLEED AT 2330.  COLD WASHCLOTH GIVEN FOR
COMFORT.  PT HAD LARGE DIARRHEA VOID AT 0430.  PT REFUSES BED ALARM BUT USES
CALL LIGHT APPROPRIATELY.  DAUGHTER AT BEDSIDE OVERNIGHT.  CALL LIGHT AND
FREQUENTLY USED ITEMS WITHIN REACH.  HOURLY ROUNDING IN PROGRESS, WILL
CONTINUE TO MONITOR.

## 2019-04-17 NOTE — NUR
pt having loose water stools now and c/o nausea with dry heaving.pt did refuse
lunch but does drink fluids well.  at bedside. pt ambulates to bathroom
with walker and min assist and is able to adjust clothing and cleanse self.
prn for pain given and po zofran to be given when pharmacy has entered.

## 2019-04-17 NOTE — NUR
ASSUMED CARE AT 1930. PATIENT RESTING IN BED UNLESS NEEDING TO ELIMINATE. UP
TO TOILET TWICE. SECOND TIME HAD VERY LARGE LIQUID STOOL THAT WAS CONTAINED IN
PULLUP. SKIN CARE DONE, MOISTURE BARRIER APPLIED. PATIENT BECOMING WEAKER AND
HAVING MUCH PAIN WITH TRANSFERS. ONCE LYING BACK IN BED MUCH MORE COMFORTABLE.
DUE TO WEAKNESS AND PAIN, WILL USE BSC THROUGH THE NIGHT FOR VOIDING AND BMS.
DAUGHTER CHRISTY VERY INVOLVED IN PATIENT'S CARE. NPO AFTER MIDNIGHT. DENIES
NAUSEA. ABD VERY DISTENDED, ASCITES NOTED. DRINKING WATER WITHOUT DIFF. HOURLY
ROUNDS CONTINUE. DAUGHTER AT BEDSIDE, REQUEST NO BED ALARM. CALL LITE IN
REACH.

## 2019-04-17 NOTE — NUR
SW met with pt and pt  to review team conference summary and plan for
pt to remain on rehab unit with plan for team to reassess pt length of stay
during team conference next Wednesday 4/24. Pt and pt  in agreement
with plan.  Possibility to know dc date during next team conference.  SW to
continue to follow to assist with safe dc planning.

## 2019-04-18 VITALS — SYSTOLIC BLOOD PRESSURE: 99 MMHG | DIASTOLIC BLOOD PRESSURE: 58 MMHG

## 2019-04-18 VITALS — DIASTOLIC BLOOD PRESSURE: 36 MMHG | SYSTOLIC BLOOD PRESSURE: 90 MMHG

## 2019-04-18 LAB
ALBUMIN SERPL-MCNC: 3.5 G/DL (ref 3.4–5)
ALP SERPL-CCNC: 56 U/L (ref 46–116)
ALT SERPL-CCNC: 20 U/L (ref 30–65)
AMMONIA PLAS-SCNC: 11 UMOL/L (ref 11–32)
ANION GAP SERPL CALC-SCNC: 8 MMOL/L (ref 7–16)
AST SERPL-CCNC: 14 U/L (ref 15–37)
BILIRUB SERPL-MCNC: 0.6 MG/DL
BUN SERPL-MCNC: 67 MG/DL (ref 7–18)
CALCIUM SERPL-MCNC: 8.5 MG/DL (ref 8.5–10.1)
CHLORIDE SERPL-SCNC: 100 MMOL/L (ref 98–107)
CO2 SERPL-SCNC: 26 MMOL/L (ref 21–32)
CREAT SERPL-MCNC: 1.1 MG/DL (ref 0.6–1.3)
GLUCOSE SERPL-MCNC: 124 MG/DL (ref 70–99)
HCT VFR BLD CALC: 31.7 % (ref 37–47)
HCT VFR BLD CALC: 38.5 % (ref 37–47)
HGB BLD-MCNC: 10.1 GM/DL (ref 12–15)
HGB BLD-MCNC: 11.8 GM/DL (ref 12–15)
MCH RBC QN AUTO: 25.6 PG (ref 26–34)
MCH RBC QN AUTO: 25.9 PG (ref 26–34)
MCHC RBC AUTO-ENTMCNC: 30.8 G/DL (ref 28–37)
MCHC RBC AUTO-ENTMCNC: 31.9 G/DL (ref 28–37)
MCV RBC: 81.2 FL (ref 80–100)
MCV RBC: 83.2 FL (ref 80–100)
MPV: 8.1 FL. (ref 7.2–11.1)
MPV: 8.5 FL. (ref 7.2–11.1)
PLATELET COUNT*: 150 THOU/UL (ref 150–400)
PLATELET COUNT*: 199 THOU/UL (ref 150–400)
POTASSIUM SERPL-SCNC: 4.7 MMOL/L (ref 3.5–5.1)
PROT SERPL-MCNC: 6.9 G/DL (ref 6.4–8.2)
RBC # BLD AUTO: 3.9 MIL/UL (ref 4.2–5)
RBC # BLD AUTO: 4.62 MIL/UL (ref 4.2–5)
RDW-CV: 20.9 % (ref 10.5–14.5)
RDW-CV: 21.6 % (ref 10.5–14.5)
SODIUM SERPL-SCNC: 134 MMOL/L (ref 136–145)
WBC # BLD AUTO: 17.4 THOU/UL (ref 4–11)
WBC # BLD AUTO: 26.6 THOU/UL (ref 4–11)

## 2019-04-18 NOTE — NUR
CALL PLACED TO [facility] TO ASK IF THE ULTRASOUND COULD BE DONE AS SOON AS POSSIBLE
PER DR. WELDON'S REQUEST. SHE STATED THAT [facility] IS NOT THERE NOW, BUT SHE WILL
LEAVE A MESSAGE TO HAVE THEM CALL REHAB WHEN THEY GET THERE.

## 2019-04-18 NOTE — NUR
OCCULT BLOOD WAS POSITIVE. PT WILL BE NPO AFTER MN FOR POSSIBLE SCOPE
TOMMORROW. NS AT 100CC/HR TO INFUSE OVERNIGHT X 1 LITER. PT DID PARTICIPATE
WITH ALL THERAPIES AND TOLERATED FAIRLY WELL TODAY. NO ACUTE DISTRESS AT THIS
TIME.

## 2019-04-19 VITALS — SYSTOLIC BLOOD PRESSURE: 91 MMHG | DIASTOLIC BLOOD PRESSURE: 46 MMHG

## 2019-04-19 VITALS — SYSTOLIC BLOOD PRESSURE: 95 MMHG | DIASTOLIC BLOOD PRESSURE: 41 MMHG

## 2019-04-19 VITALS — SYSTOLIC BLOOD PRESSURE: 98 MMHG | DIASTOLIC BLOOD PRESSURE: 54 MMHG

## 2019-04-19 LAB
ANION GAP SERPL CALC-SCNC: 10 MMOL/L (ref 7–16)
BUN SERPL-MCNC: 64 MG/DL (ref 7–18)
CALCIUM SERPL-MCNC: 8.1 MG/DL (ref 8.5–10.1)
CHLORIDE SERPL-SCNC: 101 MMOL/L (ref 98–107)
CO2 SERPL-SCNC: 22 MMOL/L (ref 21–32)
CREAT SERPL-MCNC: 1 MG/DL (ref 0.6–1.3)
GLUCOSE SERPL-MCNC: 120 MG/DL (ref 70–99)
HCT VFR BLD CALC: 28.6 % (ref 37–47)
HGB BLD-MCNC: 9.3 GM/DL (ref 12–15)
MAGNESIUM SERPL-MCNC: 3.1 MG/DL (ref 1.8–2.4)
MCH RBC QN AUTO: 26.2 PG (ref 26–34)
MCHC RBC AUTO-ENTMCNC: 32.6 G/DL (ref 28–37)
MCV RBC: 80.3 FL (ref 80–100)
MPV: 8.5 FL. (ref 7.2–11.1)
PLATELET COUNT*: 133 THOU/UL (ref 150–400)
POTASSIUM SERPL-SCNC: 4.2 MMOL/L (ref 3.5–5.1)
RBC # BLD AUTO: 3.56 MIL/UL (ref 4.2–5)
RDW-CV: 21.3 % (ref 10.5–14.5)
SODIUM SERPL-SCNC: 133 MMOL/L (ref 136–145)
WBC # BLD AUTO: 12.8 THOU/UL (ref 4–11)

## 2019-04-19 NOTE — NUR
AT 0703-STILL SLEEPING SOUNDLY.DID NOT EVEN WAKE UP WHEN COMPLETE BED CHANGE
DONE @ 0630.NO SLEEP APNEA WHEN SLEEPING SOUNDLY.SLEEP APNEA NOTED ONLY WITH
RESTLESS SLEEP.

## 2019-04-19 NOTE — NUR
PT IS DOING WELL WITH THE BOWEL PREP. BOWEL MOVEMENTS ARE A LIGHT YELLOW
LIQUID AND SHE HAS DRANK APPROX 2000CC. FAMILY IS HERE VISITING. NO ACUTE
DISTRESS.

## 2019-04-19 NOTE — NUR
AM ASSESSMENT AND VITAL SIGNS COMPLETED AS DOCUMENTED. PT HAS BEEN TAKEN TO
PACU FOR EGD, THERAPIES ARE ON HOLD PENDING RESULTS.

## 2019-04-19 NOTE — NUR
PT RETURNED TO ROOM AT 1300 IN STABLE CONDITION. PT STARTED ON A CLEAR LIQUID
DIET, TOLERATED WELL. THERAPIES HAVE RESUMED FOR THE AFTERNOON. PT WILL START
A BOWEL PREP AFTER THE LAST THERAPY SESSION, COLONOSCOPY SCHEDULED FOR
SATURDAY.

## 2019-04-19 NOTE — NUR
ASSUMED CARES AT 1920. ALERT AND ORIENTED. DAUGHTER STAYED OVERNIGHT. C/O PAIN
TO RIGHT HIP. FLEXERIL GIVEN WITH LITTLE RELIEF. REFUSED TRAMADOL.  NPO AFTER
MIDNITE. IV LEFT FOREARM WITH NS RUNNING  CC/HR. MIN ASSIST WITH GAIT
BELT AND WALKER. VERY WEAK. UP TO BSC. WAS UP NUMEROUS TIMES TO TOILET.  HAD
MULTIPLE STOOLS THAT WERE LIQUID TO WATERY. NO BLOODY STOOLS NOTED. SLEPT
LITTLE BECAUSE OF THIS. CALL LIGHT IN REACH.

## 2019-04-19 NOTE — NUR
AM ASSESSMENT AND VITAL SIGNS COMPLETED AS DOCUMENTED. PT WILL BE HAVING AN
EGD THIS AM AND IS GOING TO BE DISCHARGED TO ROOM 315. FAMILY AND PT AWARE OF
PLAN OF CARE.

## 2019-04-20 VITALS — DIASTOLIC BLOOD PRESSURE: 43 MMHG | SYSTOLIC BLOOD PRESSURE: 90 MMHG

## 2019-04-20 VITALS — DIASTOLIC BLOOD PRESSURE: 54 MMHG | SYSTOLIC BLOOD PRESSURE: 98 MMHG

## 2019-04-20 VITALS — SYSTOLIC BLOOD PRESSURE: 98 MMHG | DIASTOLIC BLOOD PRESSURE: 47 MMHG

## 2019-04-20 LAB
ANION GAP SERPL CALC-SCNC: 11 MMOL/L (ref 7–16)
BUN SERPL-MCNC: 45 MG/DL (ref 7–18)
CALCIUM SERPL-MCNC: 8.1 MG/DL (ref 8.5–10.1)
CHLORIDE SERPL-SCNC: 108 MMOL/L (ref 98–107)
CO2 SERPL-SCNC: 24 MMOL/L (ref 21–32)
CREAT SERPL-MCNC: 0.8 MG/DL (ref 0.6–1.3)
GLUCOSE SERPL-MCNC: 96 MG/DL (ref 70–99)
HCT VFR BLD CALC: 26 % (ref 37–47)
HGB BLD-MCNC: 8.5 GM/DL (ref 12–15)
MAGNESIUM SERPL-MCNC: 2.8 MG/DL (ref 1.8–2.4)
MCH RBC QN AUTO: 26.7 PG (ref 26–34)
MCHC RBC AUTO-ENTMCNC: 32.7 G/DL (ref 28–37)
MCV RBC: 81.7 FL (ref 80–100)
MPV: 8.4 FL. (ref 7.2–11.1)
PLATELET COUNT*: 99 THOU/UL (ref 150–400)
POTASSIUM SERPL-SCNC: 3.3 MMOL/L (ref 3.5–5.1)
RBC # BLD AUTO: 3.18 MIL/UL (ref 4.2–5)
RDW-CV: 21.2 % (ref 10.5–14.5)
SODIUM SERPL-SCNC: 143 MMOL/L (ref 136–145)
WBC # BLD AUTO: 6.9 THOU/UL (ref 4–11)

## 2019-04-20 PROCEDURE — 0DJ08ZZ INSPECTION OF UPPER INTESTINAL TRACT, VIA NATURAL OR ARTIFICIAL OPENING ENDOSCOPIC: ICD-10-PCS | Performed by: INTERNAL MEDICINE

## 2019-04-20 PROCEDURE — 0DBP8ZX EXCISION OF RECTUM, VIA NATURAL OR ARTIFICIAL OPENING ENDOSCOPIC, DIAGNOSTIC: ICD-10-PCS | Performed by: INTERNAL MEDICINE

## 2019-04-20 NOTE — NUR
PT HAS RETURNED FROM COLONOSCOPY AND IS ALERT AND ORIENTATED.PT DRINKING WATER
AND DENIES NAUSEA OR PAIN.PT VOIDING WELL.IV FLUIDS INFUSING TO LT FA PER
PUMP.PT STARTING TO EAT LUNCH NOW.DAUGHTER AT BEDSIDE.

## 2019-04-20 NOTE — NUR
ASSUMED CARE AT 1920. ALERT AND ORIENTED. PLEASANT. NOT AS WEAK TODAY AS
YESTERDAY. MOVING BETTER. STILL HAS PAIN TO BACK/RIGHT HIP. FLEXERIL GIVEN.
DRANK LITTLE OVER 3 LITER OF GOLYTELY BEFORE MIDNIGHT. NPO AFTER MIDNIGHT. PT
HAS HAD NUMEROUS YELLOW/LIGHT BROWN CLEAR WATERY STOOLS. UP TO BSC. MIN ASSIST
WITH GAIT BELT AND WALKER. DAUGHTER AT BEDSIDE. WILL CONTINUE TO MONITOR.

## 2019-04-20 NOTE — NUR
pt remains alert and orientated and has slept some this afternoon. pt voiding
well,min assist to bsc. iv fluids infusing well to lt fa.per pump.pt ambulates
with min assist to ed and recliner.pt in good spirits and reports feeling
better.

## 2019-04-20 NOTE — NUR
pt to pacu per w/c and is alert and orientated. pt continues to have liquid
clear stools small amounts.pt denies pain but is thursty and feels hungry. pt
remains npo and is given oral swabs to moisten mouth. iv is sl'd. daughter has
been here.

## 2019-04-21 VITALS — SYSTOLIC BLOOD PRESSURE: 102 MMHG | DIASTOLIC BLOOD PRESSURE: 46 MMHG

## 2019-04-21 VITALS — DIASTOLIC BLOOD PRESSURE: 41 MMHG | SYSTOLIC BLOOD PRESSURE: 99 MMHG

## 2019-04-21 LAB
ANION GAP SERPL CALC-SCNC: 11 MMOL/L (ref 7–16)
BUN SERPL-MCNC: 33 MG/DL (ref 7–18)
CALCIUM SERPL-MCNC: 7.7 MG/DL (ref 8.5–10.1)
CHLORIDE SERPL-SCNC: 110 MMOL/L (ref 98–107)
CO2 SERPL-SCNC: 22 MMOL/L (ref 21–32)
CREAT SERPL-MCNC: 0.8 MG/DL (ref 0.6–1.3)
GLUCOSE SERPL-MCNC: 103 MG/DL (ref 70–99)
HCT VFR BLD CALC: 22.6 % (ref 37–47)
HGB BLD-MCNC: 7.3 GM/DL (ref 12–15)
MAGNESIUM SERPL-MCNC: 2.5 MG/DL (ref 1.8–2.4)
MCH RBC QN AUTO: 26.9 PG (ref 26–34)
MCHC RBC AUTO-ENTMCNC: 32.3 G/DL (ref 28–37)
MCV RBC: 83.2 FL (ref 80–100)
MPV: 8.2 FL. (ref 7.2–11.1)
PLATELET COUNT*: 72 THOU/UL (ref 150–400)
POTASSIUM SERPL-SCNC: 3.5 MMOL/L (ref 3.5–5.1)
RBC # BLD AUTO: 2.72 MIL/UL (ref 4.2–5)
RDW-CV: 21.9 % (ref 10.5–14.5)
SODIUM SERPL-SCNC: 143 MMOL/L (ref 136–145)
WBC # BLD AUTO: 4.2 THOU/UL (ref 4–11)

## 2019-04-21 NOTE — NUR
ASSUMMED CARE OF PT AT 0730, PT ALERT, TRANSFERS WITH MIN ASSIST GB WALKER, PT
TAKING FOOD AND FLUIDS WELL, IV CHANGED TO SALINE LOCK, PT COMPLAINS OF
BACK/HIP PAIN, REFUSED LIDOCAINE PATCHES, MEDICATED WITH FLEXIRIL X 1, WHEN
ASKED IF ANY RELIEF OBTAINED PT STATES SHE DOES NOT KNOW, HEATING PAD TO BACK,
FAMILY MASSAGES RIGHT HIP AND BACK, PT REFUSED BATH THIS SHIFT, DID DO
GROOMING AND CHANGED PANTS BUT KEPT ON SAME SHIRT, MANY VISIOTRS HERE THRU OUT
SHIFT, PT UP IN CHAIR AT INTERVALS THRUOUT DAY, HOURLY ROUNDING COMPLETED,
ASSESSMENT COMPLETE WILL CONTINUE TO MONITOR.

## 2019-04-21 NOTE — NUR
ASSUMED PT CARE AT 1930.  PT ALERT AND ORIENTED X4, ALREADY IN BED AT SHIFT
CHANGE.  PT IN BETTER MOOD THAN SEEN PREVIOUSLY.  UP WITH SBA, GAIT BELT AND
WALKER TO BSC THREE TIMES OVERNIGHT TO VOID.  NO NAUSEA.  FLEXERIL ONCE FOR
BACK PAIN.  NO STOOL THIS SHIFT.  IVF INFUSING TO LEFT FOREARM WITHOUT
DIFFICULTY.  DAUGHTER AT BEDSIDE OVERNIGHT.  CALL LIGHT AND FREQUENTLY USED
ITEMS WITHIN REACH, HOURLY ROUNDING IN PROGRESS, WILL CONTINUE TO MONITOR.

## 2019-04-21 NOTE — NUR
SITTING UP IN CHAIR.  FAMILY ASSISTING PATIENT WITH GETTING HER TEETH
BRUSHED.  NO COMPLAINTS VOICED.  SEVERAL VISITORS PRESENT.

## 2019-04-22 VITALS — DIASTOLIC BLOOD PRESSURE: 52 MMHG | SYSTOLIC BLOOD PRESSURE: 95 MMHG

## 2019-04-22 VITALS — DIASTOLIC BLOOD PRESSURE: 55 MMHG | SYSTOLIC BLOOD PRESSURE: 94 MMHG

## 2019-04-22 LAB
ANION GAP SERPL CALC-SCNC: 13 MMOL/L (ref 7–16)
BUN SERPL-MCNC: 26 MG/DL (ref 7–18)
CALCIUM SERPL-MCNC: 8 MG/DL (ref 8.5–10.1)
CHLORIDE SERPL-SCNC: 111 MMOL/L (ref 98–107)
CO2 SERPL-SCNC: 21 MMOL/L (ref 21–32)
CREAT SERPL-MCNC: 1 MG/DL (ref 0.6–1.3)
GLUCOSE SERPL-MCNC: 109 MG/DL (ref 70–99)
HCT VFR BLD CALC: 23.8 % (ref 37–47)
HGB BLD-MCNC: 7.7 GM/DL (ref 12–15)
MCH RBC QN AUTO: 26.7 PG (ref 26–34)
MCHC RBC AUTO-ENTMCNC: 32.3 G/DL (ref 28–37)
MCV RBC: 82.4 FL (ref 80–100)
MPV: 8.2 FL. (ref 7.2–11.1)
PLATELET COUNT*: 64 THOU/UL (ref 150–400)
POTASSIUM SERPL-SCNC: 3.9 MMOL/L (ref 3.5–5.1)
RBC # BLD AUTO: 2.89 MIL/UL (ref 4.2–5)
RDW-CV: 21.5 % (ref 10.5–14.5)
SODIUM SERPL-SCNC: 145 MMOL/L (ref 136–145)
WBC # BLD AUTO: 4.3 THOU/UL (ref 4–11)

## 2019-04-22 NOTE — NUR
AM ASSESSMENT AND VITAL SIGNS COMPLETED AS DOCUMENTED. PT STATES SHE SLEPT
WELL AFTER 4AM BUT DOESN'T WANT TO TAKE ANY MORE TRAMADOL BECAUSE IT DECREASES
HER APPETITE. PT REQUIRES HELP GETTING TO THE EDGE OF THE BED AND SHE IS ABLE
TO SIT UP WITHOUT HELP AFTER THAT. PT AMBULATES TO THE BATHROOM USING A FFW.
FALL PRECAUTIONS AND HOURLY ROUNDING CONTINUE.

## 2019-04-22 NOTE — NUR
RESTED IN SHORT INTERVAL.  HAD DIFFICULTY WITH BACK PAIN.  PATIENT GOT UP AND
WALKED AROUND SOME DURING THE NIGHT.  DID GET SOME PAIN RELIEF AFTER TAKING
TRAMADOL.  PATIENT WAS ABLE TO SLEEP BETTER SITTING UP IN A CHAIR.  DAUGHTER
STAYED WITH PATIENT DURING THE NIGHT.  HOURLY ROUNDING IN PROGRESS.

## 2019-04-23 VITALS — DIASTOLIC BLOOD PRESSURE: 46 MMHG | SYSTOLIC BLOOD PRESSURE: 94 MMHG

## 2019-04-23 VITALS — SYSTOLIC BLOOD PRESSURE: 91 MMHG | DIASTOLIC BLOOD PRESSURE: 29 MMHG

## 2019-04-23 NOTE — NUR
ASSUMED PT CARE AT 1930.  PT ALERT AND ORIENTED X4, MANY FAMILY MEMBERS IN
ROOM AT SHIFT CHANGE.  PT WALKING IN HALLS WITH DAUGHTERS AND  BEFORE
BEDTIME.  PT C/O BACK PAIN BUT DID NOT WANT TRAMADOL OR TYLENOL, JUST
FLEXERIL. PT HAD NOSEBLEED X1.  PT UP TO BATHROOM TO VOID NUMEROUS TIMES
OVERNIGHT, STOOL X1 THIS SHIFT.  DAUGHTER AT BEDSIDE OVERNIGHT. PT QUITE
EXACTING WITH CARES AND DEMANDING OF DAUGHTER. USES CALL LIGHT APPROPRIATELY.
CALL LIGHT AND FREQUENTLY USED ITEMS WITHIN REACH.  HOURLY ROUNDING IN
PROGRESS, WILL CONTINUE TO MONITOR.

## 2019-04-23 NOTE — PATH
[address]  [address]                    PATHOLOGY RPT PROCEDURE       
_______________________________________________________________________________
 
Name:       ANA CEDILLO                 Room:           M.320-D    [facility] [place]  
M.R.#:  [id number]      Account #:      [id number]  
Admission:  04/09/19     Date of Birth:  [birth year]  
Discharge:                             Report #:    [id number]
                                                         Path Case #: [id number]
_______________________________________________________________________________
 
LCA Accession Number: [id number]
.                                                                01
Material submitted:                                        .
rectum - BIOPSY, RECTAL ULCER
.                                                                01
Clinical history:                                          .
None provided
.
.                                                                02
**********************************************************************
Diagnosis:
Biopsy rectal ulcer:
- Benign colonic mucosa with evidence of non-specific ulceration, negative
for granulomas, viral inclusions and dysplasia.  See comment.
LBQ/04/23/2019
**********************************************************************
.                                                                02
Comment:
The biopsy reveals a fragment of benign colonic mucosa having an abrupt
transition to granulation tissue/ulcer bed, without significant
basal lymphoplasmacytosis or crypt distortion to raise a concern for
inflammatory bowel disease. Ischemic features are also not apparent.
(ELENA/db; 4/23/2019)
.                                                                02
Electronically signed:                                     .
Mik Martin MD, Pathologist
NPI- [id number]
.                                                                01
Gross description:                                         .
Received in formalin labeled "Ana Cedillo, biopsy rectal ulcer," are two
segments of pale tan soft tissue measuring 0.2 x 0.1 x 0.1 cm and 0.3 x
0.3 x 0.3 cm in greatest dimensions.  The specimen is submitted entirely
in cassette A1.  The smaller segment may not survive processing.
(DAC; 4/22/2019)
XDC/XDC
.                                                                02
Pathologist provided ICD-10:
K62.6
.                                                                02
CPT                                                        .
360882
Specimen Comment: A courtesy copy of this report has been sent to
Specimen Comment: [phone], [phone].
Specimen Comment: Report sent to  / DR REBOLLEDO
***Performed at:  01
   [facility], [place], [place]  [id number]
   MD Dez Lopez MD Phone:  [phone]
 
[place] [address]                    PATHOLOGY RPT PROCEDURE       
_______________________________________________________________________________
 
Name:       GERARDO CEDILLOVAUGHN IVAN                 Room:           [address] [place]  
[facility].#:  [id number]      Account #:      [id number]  
Admission:  04/09/19     Date of Birth:  [birth year]  
Discharge:                             Report #:    [id number]
                                                         Path Case #: [id number]
_______________________________________________________________________________
***Performed at:  02
   [facility] [address] [place]  [id number]
   MD Mik Martin MD Phone:  [phone]

## 2019-04-23 NOTE — NUR
PT CARE ASSUMED THIS AM, ASSESSMENT AND VITAL SIGNS COMPLETED AS DOCUMENTED.
PT HAS PARTICIPATED IN ALL SCHEDULED THERAPIES, FAIR TOLERANCE BUT MINIMAL
PROGRESS BEING MADE. PT HAS INCREASED PAIN AND DIFFICULTY FROM LYING OR
SITTING TO STANDING. SCHEDULED FLEXARIL GIVEN AND HEATING PAD IN USE WHILE AT
REST. NO ACUTE DISTRESS, FALL PRECAUTIONS AND HOURLY ROUNDING CONTINUE.

## 2019-04-23 NOTE — NUR
LEWIS met with pt dtr and pt in preparation for team conference tomorrow.  Pt dtr
concerned about financial/billing for acute stay and SW suggested pt discuss
with billing office.  Pt dtr only main concern is pt bed mobility.  LEWIS also
discussed HH follow up services and called and left [facility] at [facility] a
message as pt dtr also wanted to know about pt qualifying for Medicaid as a
secondary insurance.  Pt and pt dtr expecting pt to be ready to dc sometime
soon and discussed toilet riser/commode and preparing for safe dc home with
family; pt dtr Ana plans to stay with pt every night at least initially at
dc.

## 2019-04-24 VITALS — SYSTOLIC BLOOD PRESSURE: 93 MMHG | DIASTOLIC BLOOD PRESSURE: 41 MMHG

## 2019-04-24 VITALS — DIASTOLIC BLOOD PRESSURE: 47 MMHG | SYSTOLIC BLOOD PRESSURE: 99 MMHG

## 2019-04-24 LAB
HCT VFR BLD CALC: 23.4 % (ref 37–47)
HGB BLD-MCNC: 7.4 GM/DL (ref 12–15)
MCH RBC QN AUTO: 26.3 PG (ref 26–34)
MCHC RBC AUTO-ENTMCNC: 31.7 G/DL (ref 28–37)
MCV RBC: 83 FL (ref 80–100)
MPV: 10.1 FL. (ref 7.2–11.1)
PLATELET COUNT*: 67 THOU/UL (ref 150–400)
RBC # BLD AUTO: 2.82 MIL/UL (ref 4.2–5)
RDW-CV: 22.3 % (ref 10.5–14.5)
WBC # BLD AUTO: 4 THOU/UL (ref 4–11)

## 2019-04-24 NOTE — NUR
ASSUMED CARE @ 1945-4/23-TUES.SITS IN RECLINER VISITING W/ 4 DAUGHTERS,A SON &
.AMBULATED IN HALLWAY W/ GB & WALKER W/ SBA @ 2005.FAMILY WALKING W/
PATIENT ALSO.K PAD NOT IN USE.DAUGHTER CHRISTY STAYING ALL NIGHT.PRN VOLTAREN
OINT.APPLIED BY DAUGHTER TO PATIENT'S BACK @ 2114.WEARS PULL UPS.AT 0020-4/24-
WED-PATIENT WANTS DAUGHTER TO MASSAGE BACK.ON HOURLY ROUNDS.CNA DOING ODD HOUR
ROUNDS.

## 2019-04-24 NOTE — NUR
PT HAS PARTICIPATED WITH THERAPIES AND CALLS FOR ASSIST AS NEEDS. PT HAS C/O
BACK PAIN AND REFUSES PAIN MEDICATION. LIDOCAIN PATCH APPLIED EARLIER AND
VOLTARIN CREAM APPLIED THIS AFTERNOON. PT IS ALERT AND [id number] BUT IS
NEEDY AND WILL REQUEST SIMPLE TASKS OF NURSING AND FAMILY THAT SHE CAN DO FOR
HERSELF.PT REMAINS CONTINENT OF B+B VOIDING CLEAR YELLOW URINS AND HAVING
MODERATS BROWN SOFT FORMED STOOLS. PT HAS EATEN MEALS IN DINNINGROOM TODAY.PT
PLANNING TO GO HOME FRIDAY AFTER FAMILY TEACHING. HOURLY ROUNDING CONTINUES.

## 2019-04-24 NOTE — NUR
SLEPT LATE SINCE 000O-4/24-WED.AWAKE @ INTERVALS FOR TOILETING W/ SBA X 8.
CALLS SEVERAL TIMES TO ASSIST IN TURNING OR TO SIT IN RECLINER.DAUGHTER
STAYING ALL NIGHT.REFUSED HS SNACK.

## 2019-04-24 NOTE — NUR
SW met with pt, pt dtr, pt , and talked with pt dtr on speaker phone to
review team conference summary and plan for family training on Thursday and
for pt to dc home on Friday 4/26 with family support and HH services. SW
discussed HH agency options and preference for [facility].  SW faxed
initial referral info and final orders and med list to be faxed by dc date.
Pt has needed DME.  Pt and pt family in agreement with plan.  SW to continue
to follow to assist with finalizing safe dc plan.

## 2019-04-25 VITALS — SYSTOLIC BLOOD PRESSURE: 108 MMHG | DIASTOLIC BLOOD PRESSURE: 62 MMHG

## 2019-04-25 VITALS — DIASTOLIC BLOOD PRESSURE: 62 MMHG | SYSTOLIC BLOOD PRESSURE: 108 MMHG

## 2019-04-25 VITALS — DIASTOLIC BLOOD PRESSURE: 48 MMHG | SYSTOLIC BLOOD PRESSURE: 93 MMHG

## 2019-04-25 LAB
BACTERIA-REFLEX: (no result) /HPF
BILIRUB UR-MCNC: NEGATIVE MG/DL
COLOR UR: (no result)
KETONES UR STRIP-MCNC: (no result) MG/DL
PROT UR QL STRIP: (no result)
RBC # UR STRIP: (no result) /UL
RBC #/AREA URNS HPF: (no result) /HPF (ref 0–2)
SP GR UR STRIP: 1.02 (ref 1–1.03)
SQUAMOUS: (no result) /LPF (ref 0–3)
URINE CLARITY: (no result)
URINE GLUCOSE-RANDOM: NEGATIVE
URINE LEUKOCYTES-REFLEX: (no result)
URINE NITRITE-REFLEX: NEGATIVE
UROBILINOGEN UR STRIP-ACNC: 0.2 E.U./DL (ref 0.2–1)

## 2019-04-25 NOTE — NUR
PT HAS PARTICIPATED WITH THERAPIES AND CALLS FOR ASSIST AS NEEDS. PT IS
CONTINENT OF B+B. UA SENT TO LAB THIS EVENING AND DOES APPEAR TO BE BLOODY.PT
REPORTS BACK PAIN NOT AS SEVERE AS YESTERDAY BUT REFUSES PAIN MEDICATIONS,
CONTINUES TO TAKE MUSCLE RELAXER.PT EATS MEALS IN DINNINGROOM AND HAS GOOD
APPETITE.PT REMAINS ALERT AND ORIENTATED.FAMILY HAS PARTICIPATED IN TEACHING
TODAY.

## 2019-04-25 NOTE — NUR
REFUSED HS SNACK.SLEPT LATE @ 2300 & AWAKE @ INTERVALS DURING NIGHT FOR BRP
W/ SBA X9.K PAD ON BACK WHILE IN RECLINER OR IN BED.SLEPT 3X ONLY FROM 2300 TO
2340-40 MINUTES.SLEPT FROM 0400 TO 0425-25 MINUTES ONLY & FROM 0445.SITS IN
RECLINER @ INTERVALS ALSO.DAUGHTER CHRISTY LEFT @ 0500 TO GO TO WORK.

## 2019-04-25 NOTE — NUR
ASSUMED CARE @ 1935-4/24-WED.SITS IN RECLINER W/ K PAD ON BACK.ALL NINE
CHILDREN IN ROOM TO CELEBRATE FATHER'S BIRTHDAY TODAY.PRN MELATONIN 5 MG ORAL
GIVEN @ 2027 PER PT'S REQUEST.REPEAT DOSE 5 MG GIVEN @ 2206.AMBULATED IN
HALLWAY @ 2243 W/ GB/WALKER W/ SBA X 5 MINUTES.AMBULATED 2ND TIME IN HALLWAY @
2344 X 10- MINUTES.SAT IN RECLINER @ 0040 X 30 MINUTES TAKING ICE CHIPS.WEARS
PULL UPS.DAUGHTER CHRISTY STAYING ALL NIGHT.K PAd ALSO APPLIED TO BACK WHILE IN
BED.FAVORS LYING ON HER LEFT SIDE.ON HOURLY ROUNDS.

## 2019-04-25 NOTE — NUR
SITTING UP IN RECLINER WITH MULTIPLE PILLOWS BEHIND HER BACK.  MULTIPLE FAMILY
MEMBERS IN ROOM VISITING.  PATIENT IN GOOD SPIRITS.  WANTS SOMETHING TO HELP
HER SLEEP BUT MELATONIN WAS INEFFECTIVE LAST NIGHT AND NOT SURE ABOUT TAKING
AMBIEN.  DAUGHTER ASKING QUESTIONS ABOUT PATIENT'S MEDICATIONS.  WANTING TO
KNOW ABOUT MIDODRINE AND IF PATIENT IS STILL ON LASIX.  DAUGHTER ALSO WANTING
TO KNOW ABOUT URINE ANALYSIS RESULTS.  EXPLAINED THAT DOCTOR WOULD TALK TO HER
ABOUT IT TOMORROW.

## 2019-04-26 VITALS — DIASTOLIC BLOOD PRESSURE: 62 MMHG | SYSTOLIC BLOOD PRESSURE: 108 MMHG

## 2019-04-26 VITALS — SYSTOLIC BLOOD PRESSURE: 112 MMHG | DIASTOLIC BLOOD PRESSURE: 50 MMHG

## 2019-04-26 LAB
ALBUMIN SERPL-MCNC: 2.6 G/DL (ref 3.4–5)
ALP SERPL-CCNC: 51 U/L (ref 46–116)
ALT SERPL-CCNC: 10 U/L (ref 30–65)
ANION GAP SERPL CALC-SCNC: 10 MMOL/L (ref 7–16)
AST SERPL-CCNC: 11 U/L (ref 15–37)
BILIRUB SERPL-MCNC: 0.3 MG/DL
BUN SERPL-MCNC: 20 MG/DL (ref 7–18)
CALCIUM SERPL-MCNC: 8.2 MG/DL (ref 8.5–10.1)
CHLORIDE SERPL-SCNC: 107 MMOL/L (ref 98–107)
CO2 SERPL-SCNC: 21 MMOL/L (ref 21–32)
CREAT SERPL-MCNC: 0.7 MG/DL (ref 0.6–1.3)
GLUCOSE SERPL-MCNC: 103 MG/DL (ref 70–99)
INR PPP: 1
POTASSIUM SERPL-SCNC: 4.5 MMOL/L (ref 3.5–5.1)
PROT SERPL-MCNC: 5.5 G/DL (ref 6.4–8.2)
PROTHROMBIN TIME: 10.3 SECONDS (ref 9.2–11.5)
SODIUM SERPL-SCNC: 138 MMOL/L (ref 136–145)

## 2019-04-26 NOTE — NUR
PT COMPLAINS OF HEADACHE BUT STATES IT IS SLIGHTLY BETTER SINCE
MEDICATION GIVEN, PT ATE DINNER AND STATES SHE IS READY TO BE
DISCHARGED, DAUGHTER HER TO TAKE PT HOME, DISCHARGE
EDUCATION DONE WITH PT AND FAMILY, DISCUSSED MEDICATIONS, FOLLOW UP
APPTS, WHEN TO CALL PHYSICIAN, HOME HEALTH, FALL PRECAUTIONS, FAMILY AND PT
STATE UNDERSTANDING OF DISCHARGE INSTRUCTIONS, SCRIPT GIVEN TO DAUGHTER,
PT DISCHARGED WITH BELONGINGS TO MAIN ENTRANCE.

## 2019-04-26 NOTE — NUR
ASSUMMED CARE OF PT AT 0730, PT ALERT AND ORIENTED, PT TRANSFERS WITH SBA, GB
WALKER, AMBULATED TO DININGROOM FOR LUNCH, PT COMPLAINS OF BACK PAIN , REFUSES
PAIN MEDICATION OR LIDOCAINE PATCHES, PT DID TAKE 1 TYLENOL FOR HEADACHE THIS
PM, TAKING FOOD AND FLUIDS WELL, AMBULATES TO BATHROOM TO VOID, PARTICIAPTED
IN ALL THERAPIES, HOURLY ROUNDING COMPLETED, ASSESSMENT COMPLETE, WILL
CONTINUE TO MNOITOR. AWAITING FAMILY FOR DISCHARGE LATER TODAY.

## 2019-04-26 NOTE — NUR
UP MULTIPLE TIMES TO THE BATHROOM DURING THE NIGHT TO VOID.  PATIENT ASKED FOR
MORE FLEXERIL DURING THE NIGHT FOR COMPLAINT OF BACK PAIN.  OFFER OF PRN
TRAMADOL DECLINED.  PATIENT'S SAID SHE WAS HAPPY THAT SHE DIDN'T HAVE BLOOD IN
HER URINE WHICH HAS BEEN YELLOW TONIGHT.  HOURLY ROUNDING IN PROGRESS.

## 2019-06-06 ENCOUNTER — HOSPITAL ENCOUNTER (INPATIENT)
Dept: HOSPITAL 96 - M.ERS | Age: 79
LOS: 6 days | Discharge: HOME | DRG: 368 | End: 2019-06-12
Attending: INTERNAL MEDICINE | Admitting: INTERNAL MEDICINE
Payer: MEDICARE

## 2019-06-06 VITALS — SYSTOLIC BLOOD PRESSURE: 109 MMHG | DIASTOLIC BLOOD PRESSURE: 58 MMHG

## 2019-06-06 VITALS — DIASTOLIC BLOOD PRESSURE: 59 MMHG | SYSTOLIC BLOOD PRESSURE: 112 MMHG

## 2019-06-06 VITALS — SYSTOLIC BLOOD PRESSURE: 159 MMHG | DIASTOLIC BLOOD PRESSURE: 77 MMHG

## 2019-06-06 VITALS — SYSTOLIC BLOOD PRESSURE: 108 MMHG | DIASTOLIC BLOOD PRESSURE: 48 MMHG

## 2019-06-06 VITALS — BODY MASS INDEX: 21.81 KG/M2 | HEIGHT: 65 IN | WEIGHT: 130.9 LBS

## 2019-06-06 VITALS — SYSTOLIC BLOOD PRESSURE: 90 MMHG | DIASTOLIC BLOOD PRESSURE: 62 MMHG

## 2019-06-06 VITALS — SYSTOLIC BLOOD PRESSURE: 96 MMHG | DIASTOLIC BLOOD PRESSURE: 66 MMHG

## 2019-06-06 VITALS — DIASTOLIC BLOOD PRESSURE: 44 MMHG | SYSTOLIC BLOOD PRESSURE: 83 MMHG

## 2019-06-06 VITALS — SYSTOLIC BLOOD PRESSURE: 95 MMHG | DIASTOLIC BLOOD PRESSURE: 56 MMHG

## 2019-06-06 VITALS — DIASTOLIC BLOOD PRESSURE: 50 MMHG | SYSTOLIC BLOOD PRESSURE: 94 MMHG

## 2019-06-06 VITALS — SYSTOLIC BLOOD PRESSURE: 131 MMHG | DIASTOLIC BLOOD PRESSURE: 82 MMHG

## 2019-06-06 VITALS — DIASTOLIC BLOOD PRESSURE: 56 MMHG | SYSTOLIC BLOOD PRESSURE: 99 MMHG

## 2019-06-06 VITALS — DIASTOLIC BLOOD PRESSURE: 38 MMHG | SYSTOLIC BLOOD PRESSURE: 80 MMHG

## 2019-06-06 VITALS — SYSTOLIC BLOOD PRESSURE: 117 MMHG | DIASTOLIC BLOOD PRESSURE: 63 MMHG

## 2019-06-06 VITALS — DIASTOLIC BLOOD PRESSURE: 44 MMHG | SYSTOLIC BLOOD PRESSURE: 87 MMHG

## 2019-06-06 VITALS — DIASTOLIC BLOOD PRESSURE: 63 MMHG | SYSTOLIC BLOOD PRESSURE: 117 MMHG

## 2019-06-06 DIAGNOSIS — Z88.6: ICD-10-CM

## 2019-06-06 DIAGNOSIS — K31.89: ICD-10-CM

## 2019-06-06 DIAGNOSIS — K74.60: ICD-10-CM

## 2019-06-06 DIAGNOSIS — D62: ICD-10-CM

## 2019-06-06 DIAGNOSIS — Z79.899: ICD-10-CM

## 2019-06-06 DIAGNOSIS — K75.4: ICD-10-CM

## 2019-06-06 DIAGNOSIS — I48.91: ICD-10-CM

## 2019-06-06 DIAGNOSIS — E03.9: ICD-10-CM

## 2019-06-06 DIAGNOSIS — D46.9: ICD-10-CM

## 2019-06-06 DIAGNOSIS — K76.6: ICD-10-CM

## 2019-06-06 DIAGNOSIS — E44.0: ICD-10-CM

## 2019-06-06 DIAGNOSIS — R18.8: ICD-10-CM

## 2019-06-06 DIAGNOSIS — C95.90: ICD-10-CM

## 2019-06-06 DIAGNOSIS — D69.6: ICD-10-CM

## 2019-06-06 DIAGNOSIS — Z88.2: ICD-10-CM

## 2019-06-06 DIAGNOSIS — Z90.49: ICD-10-CM

## 2019-06-06 DIAGNOSIS — I50.23: ICD-10-CM

## 2019-06-06 DIAGNOSIS — Z88.0: ICD-10-CM

## 2019-06-06 DIAGNOSIS — I95.9: ICD-10-CM

## 2019-06-06 DIAGNOSIS — I85.01: Primary | ICD-10-CM

## 2019-06-06 DIAGNOSIS — I86.4: ICD-10-CM

## 2019-06-06 LAB
ABSOLUTE MONOCYTES: 0.3 THOU/UL (ref 0–1.2)
ALBUMIN SERPL-MCNC: 2.9 G/DL (ref 3.4–5)
ALP SERPL-CCNC: 66 U/L (ref 46–116)
ALT SERPL-CCNC: 18 U/L (ref 30–65)
ANION GAP SERPL CALC-SCNC: 12 MMOL/L (ref 7–16)
APTT BLD: 29 SECONDS (ref 25–31.3)
AST SERPL-CCNC: 19 U/L (ref 15–37)
BACTERIA-REFLEX: (no result) /HPF
BILIRUB SERPL-MCNC: 0.7 MG/DL
BILIRUB UR-MCNC: NEGATIVE MG/DL
BUN SERPL-MCNC: 39 MG/DL (ref 7–18)
CALCIUM SERPL-MCNC: 8.5 MG/DL (ref 8.5–10.1)
CHLORIDE SERPL-SCNC: 109 MMOL/L (ref 98–107)
CO2 SERPL-SCNC: 21 MMOL/L (ref 21–32)
COLOR UR: YELLOW
CREAT SERPL-MCNC: 1 MG/DL (ref 0.6–1.3)
GLUCOSE SERPL-MCNC: 117 MG/DL (ref 70–99)
GRANULOCYTES NFR BLD MANUAL: 73 %
HCT VFR BLD CALC: 20 % (ref 37–47)
HCT VFR BLD CALC: 22.6 % (ref 37–47)
HGB BLD-MCNC: 6.4 GM/DL (ref 12–15)
HGB BLD-MCNC: 7.2 GM/DL (ref 12–15)
HYALINE CASTS #/AREA URNS LPF: (no result) /LPF
INR PPP: 1.1
KETONES UR STRIP-MCNC: NEGATIVE MG/DL
LYMPHOCYTES # BLD: 1.7 THOU/UL (ref 0.8–5.3)
LYMPHOCYTES NFR BLD AUTO: 13 %
MCH RBC QN AUTO: 27.1 PG (ref 26–34)
MCHC RBC AUTO-ENTMCNC: 31.8 G/DL (ref 28–37)
MCV RBC: 85.2 FL (ref 80–100)
METAMYELOCYTES NFR BLD: 8 %
MONOCYTES NFR BLD: 2 %
MPV: 8.6 FL. (ref 7.2–11.1)
NEUTROPHILS # BLD: 10.8 THOU/UL (ref 1.6–8.1)
NEUTS BAND NFR BLD: 4 %
NUCLEATED RBCS: 3 /100WBC
PLATELET COUNT*: 24 THOU/UL (ref 150–400)
POTASSIUM SERPL-SCNC: 4.9 MMOL/L (ref 3.5–5.1)
PROT SERPL-MCNC: 5.8 G/DL (ref 6.4–8.2)
PROT UR QL STRIP: NEGATIVE
PROTHROMBIN TIME: 11.3 SECONDS (ref 9.2–11.5)
RBC # BLD AUTO: 2.65 MIL/UL (ref 4.2–5)
RBC # UR STRIP: (no result) /UL
RBC #/AREA URNS HPF: (no result) /HPF (ref 0–2)
RDW-CV: 19.9 % (ref 10.5–14.5)
SODIUM SERPL-SCNC: 142 MMOL/L (ref 136–145)
SP GR UR STRIP: 1.02 (ref 1–1.03)
SQUAMOUS: (no result) /LPF (ref 0–3)
URINE CLARITY: CLEAR
URINE GLUCOSE-RANDOM: NEGATIVE
URINE LEUKOCYTES-REFLEX: (no result)
URINE NITRITE-REFLEX: NEGATIVE
URINE WBC-REFLEX: (no result) /HPF (ref 0–5)
UROBILINOGEN UR STRIP-ACNC: 0.2 E.U./DL (ref 0.2–1)
WBC # BLD AUTO: 12.7 THOU/UL (ref 4–11)

## 2019-06-06 NOTE — NUR
RIGHT BASILIC VESSEL ACCESSED FOR 5 [place] DUAK KUMEN PICC.  LINE PRE-TRIMMED
TO 41 CM AND ADVANCED TO TH3E 1CM VIRGINIE WITH NO RESISTANCE MET.  UPPER ARM
CIRCUMFERENCE ABOVE INSERTION SITE = 7 1/2".  SHERLOCK MAGNET AND 3CG
CONFIRMATION OF TIP TERMINATION AT THE CAVOATRIAL JUNCTION APPRECIATED.
INSERTINO ASTIE DRESSED, GUIDWIRE REMOVED AND LINE FLUSHED.  REPORT GIVEN TO
NURSING STAFF.

## 2019-06-06 NOTE — CON
[address]  [address]                    CONSULTATION                  
_______________________________________________________________________________
 
Name:       CHRISTY LR                 Room:           [address] [place]  
[facility].#:  [id number]      Account #:      [id number]  
Admission:  06/06/19     Attend Phys:    JANNY Rizvi
Discharge:               Date of Birth:  [birth year]  
         Report #: [id number]
                                                                     [id number]  
_______________________________________________________________________________
THIS REPORT FOR:  //name//                      
 
CC: Janice Hagen
 
HISTORY OF PRESENT ILLNESS:  This is a pleasant 78-year-old female with past
medical history significant for cirrhosis, decompensated by presence of ascites
and varices, myelodysplastic syndrome, who is presenting with hematochezia.  The
patient reports she began experiencing these symptoms since yesterday. 
Initially, the stools were dark in color and progressed to getting dark red in
color.  She also reports associated dizziness and abdominal discomfort.
 
The patient was diagnosed with cirrhosis and varices during her last
hospitalization for similar upper GI bleed.  Initially in March of this year,
she underwent an EGD and banding of esophageal varices.  A subsequent EGD was
again performed in April for recurrent hematochezia and at this time, her
varices were found to be blackened to grade 1.  However, the patient did have a
solitary ulcer in the rectum, which was biopsied and this too was normal.
 
PAST MEDICAL HISTORY:  Significant for myelodysplastic syndrome and cirrhosis.
 
PAST SURGICAL HISTORY:  Appendectomy, tonsillectomy.
 
SOCIAL HISTORY:  There is no significant history of smoking, alcohol or
recreational drug use.
 
FAMILY HISTORY:  Negative for any colon or Martin related neoplasia.
 
REVIEW OF SYSTEMS:  Unable to obtain because the patient is currently in
significant distress.
 
PHYSICAL EXAMINATION:
VITAL SIGNS:  Temperature 37.3, pulse rate 93, respirations 16, blood pressure
94/50, pulse ox 99% on room air.
GENERAL:  The patient is alert, awake, responding appropriately.  Appears to be
in mild distress.  The patient also appears to be chronically ill with temporal
muscle wasting.  Spider angiomata scattered over the upper extremities.
HEENT:  Mucous membranes are moist.  There is no congestion.
LUNGS:  Clear to auscultation bilaterally.
CARDIOVASCULAR:  Rate and rhythm regular.  S1, S2 present.
ABDOMEN:  Soft.  There is no significant distention, guarding or rigidity.
EXTREMITIES:  Warm.  There is about 1-2+ pitting edema bilaterally.
 
LABORATORY DATA:  Hemoglobin 7.2, hematocrit 22.6, platelet count 24, WBC count
12.7.  Sodium 142, potassium 4.9, chloride 109, bicarbonate 21, BUN 39,
creatinine 1.  Total bilirubin 0.7, AST 19, ALT 18, alkaline phosphatase 66.
 
 
 
[place] [address]                    CONSULTATION                  
_______________________________________________________________________________
 
Name:       BECHRISTY C                 Room:           [address] [place]  
[facility]#:  [id number]      Account #:      [id number]  
Admission:  06/06/19     Attend Phys:    JANNY Rizvi
Discharge:               Date of Birth:  [birth year]  
         Report #: [id number]
                                                                     [id number]  
_______________________________________________________________________________
 
ASSESSMENT AND PLAN:  A pleasant 78-year-old female with history of
myelodysplastic syndrome and cirrhosis, known to have varices, who is presenting
with bright red blood per rectum.  The patient's platelets are too low for
safely allow endoscopic evaluation.  She will need platelet transfusion and her
platelets maintained at least above 50 before we can proceed.  I will place her
on PPI and octreotide drips in the interim for variceal upper GI bleeding as
well as 1 gram of ceftriaxone daily for antibiotic prophylaxis.  Plan to perform
the upper GI endoscopy tomorrow.
 
Thank you for this consultation.  Please do not hesitate to call if you have any
questions.
 
 
 
 
 
 
 
 
 
 
 
 
 
 
 
 
 
 
 
 
 
 
 
 
 
 
 
 
 
 
 
 
                       
                                        By:                                
                 
D: 06/06/19 2315_______________________________________
T: 06/07/19 2248Yonas Cunningham MD            /nt

## 2019-06-06 NOTE — NUR
RECEIVED PT FROM ER. PT HAD ONE BLOODY STOOL. PT ON LEVOPHED. TITRATING PER
PROTOCOL. PT 90-92% ON 02. 2L NC PLACED.  PT SATTING 98%. PER ER REPORT PT
RECEIVED ONE UNIT OF RBCS AND ONE UNIT OF PLATELETS IN ER. URINE SAMPLE
COLLECTED.

## 2019-06-06 NOTE — NUR
EGD PLANNED FOR TOMORROW PER DR LOPEZ IF PLATLETS ARE ABOVE 50. DR FELICIANO CALLED TO CHECK ON PATIENT, INFORMED OF PATIENT STATUS. LABS ORDERED
FOR AM.

## 2019-06-06 NOTE — EKG
[place] [address]
Phone:  [phone]                     ELECTROCARDIOGRAM REPORT      
_______________________________________________________________________________
 
Name:       CHRISTY LR                 Room:           [place] [address]    ADM [place]  
[facility].#:  [id number]      Account #:      [id number]  
Admission:  19     Attend Phys:    JANNY Rizvi
Discharge:               Date of Birth:  [birth year]  
         Report #: [id number]
    [id number]
_______________________________________________________________________________
THIS REPORT FOR:  //name//                      
 
                         [facility] ED
                                       
Test Date:    2019               Test Time:    11:58:58
Pat Name:     CHRISTY LR             Department:   
Patient ID:   [id number]            Room:         [place] [address]
Gender:       F                        Technician:   LETY
:          1940               Requested By: Arie Delatorre
Order Number: [id number] MD:   Paul Wilson
                                 Measurements
Intervals                              Axis          
Rate:         86                       P:            105
RI:           151                      QRS:          100
QRSD:         130                      T:            111
QT:           411                                    
QTc:          492                                    
                           Interpretive Statements
Right and left arm electrode reversal, interpretation assumes no reversal
Sinus rhythm
Probable left atrial enlargement
Nonspecific intraventricular conduction delay
Nonspecific T abnormalities, lateral leads
Compared to ECG 2019 11:48:12
Junctional rhythm no longer present
Ventricular premature complex(es) no longer present
 
T-wave abnormality still present
 
Electronically Signed On 2019 15:37:52 CDT by Paul Wilson
[email or web address]
 
 
 
 
 
 
 
 
 
 
 
 
  <ELECTRONICALLY SIGNED>
                                           By: Paul Wilson MD, [facility]      
  19     1537
D: 19 1158   _____________________________________
T: 19 1158   Paul Wilson MD, [facility]        /EPI

## 2019-06-06 NOTE — NUR
PT. WANTING TO GET UP TO COMMODE EVERY 30 MIN-1HOUR.  INFORMED HER THAT HER
HGB IS 6.4 AND SHE IS ON A MEDICATION FOR HER BLOOD PRESSURE, SHE CANNOT GET
OUT OF BED.  REFUSING BEDPAN.  REFUSING TOBIAS.  RADHA FEMALE EXTERNAL
CATHETER APPLIED.  PT. VERY RESTLESS, WANTS TO GET UP AND SIT ON SIDE OF BED
EVERY 30 MIN.  WILL CONTINUE TO MONITOR.

## 2019-06-06 NOTE — NUR
CALL BACK TO DR. DAWSON FOR PT'S CONTINUING LOW BLOOD PRESSURE, AT 1336 HAD
CALLED DR. DAWSON FOR A BLOOD PRESSURE 77/36 AND PER VERBAL ORDER STARTED A
LITER OF NORMAL SALINE WIDE OPEN. BLOOD PRESSURE HAS NOT IMPROVED.
DR. DAWSON HAS ASKED FOR PICC LINE TO BE PLACED, PICC LINE TEAM  
CALLED FOR PICC LINE TO BE PLACED. DR. DAWSON HAS COME TO ER AND TALKED WITH
THIS RN, PT IS UNDER DR. FELICIANO'S CARE. FAMILY INFORMED OF PLANS

## 2019-06-07 VITALS — DIASTOLIC BLOOD PRESSURE: 60 MMHG | SYSTOLIC BLOOD PRESSURE: 105 MMHG

## 2019-06-07 VITALS — SYSTOLIC BLOOD PRESSURE: 100 MMHG | DIASTOLIC BLOOD PRESSURE: 51 MMHG

## 2019-06-07 VITALS — DIASTOLIC BLOOD PRESSURE: 48 MMHG | SYSTOLIC BLOOD PRESSURE: 101 MMHG

## 2019-06-07 VITALS — DIASTOLIC BLOOD PRESSURE: 58 MMHG | SYSTOLIC BLOOD PRESSURE: 100 MMHG

## 2019-06-07 VITALS — SYSTOLIC BLOOD PRESSURE: 104 MMHG | DIASTOLIC BLOOD PRESSURE: 53 MMHG

## 2019-06-07 VITALS — SYSTOLIC BLOOD PRESSURE: 105 MMHG | DIASTOLIC BLOOD PRESSURE: 54 MMHG

## 2019-06-07 VITALS — DIASTOLIC BLOOD PRESSURE: 67 MMHG | SYSTOLIC BLOOD PRESSURE: 113 MMHG

## 2019-06-07 VITALS — SYSTOLIC BLOOD PRESSURE: 106 MMHG | DIASTOLIC BLOOD PRESSURE: 68 MMHG

## 2019-06-07 VITALS — DIASTOLIC BLOOD PRESSURE: 50 MMHG | SYSTOLIC BLOOD PRESSURE: 102 MMHG

## 2019-06-07 VITALS — SYSTOLIC BLOOD PRESSURE: 103 MMHG | DIASTOLIC BLOOD PRESSURE: 55 MMHG

## 2019-06-07 VITALS — SYSTOLIC BLOOD PRESSURE: 104 MMHG | DIASTOLIC BLOOD PRESSURE: 43 MMHG

## 2019-06-07 VITALS — DIASTOLIC BLOOD PRESSURE: 49 MMHG | SYSTOLIC BLOOD PRESSURE: 115 MMHG

## 2019-06-07 VITALS — DIASTOLIC BLOOD PRESSURE: 39 MMHG | SYSTOLIC BLOOD PRESSURE: 99 MMHG

## 2019-06-07 VITALS — SYSTOLIC BLOOD PRESSURE: 105 MMHG | DIASTOLIC BLOOD PRESSURE: 63 MMHG

## 2019-06-07 VITALS — DIASTOLIC BLOOD PRESSURE: 59 MMHG | SYSTOLIC BLOOD PRESSURE: 104 MMHG

## 2019-06-07 VITALS — SYSTOLIC BLOOD PRESSURE: 104 MMHG | DIASTOLIC BLOOD PRESSURE: 59 MMHG

## 2019-06-07 VITALS — SYSTOLIC BLOOD PRESSURE: 105 MMHG | DIASTOLIC BLOOD PRESSURE: 46 MMHG

## 2019-06-07 VITALS — SYSTOLIC BLOOD PRESSURE: 104 MMHG | DIASTOLIC BLOOD PRESSURE: 46 MMHG

## 2019-06-07 VITALS — DIASTOLIC BLOOD PRESSURE: 54 MMHG | SYSTOLIC BLOOD PRESSURE: 99 MMHG

## 2019-06-07 VITALS — DIASTOLIC BLOOD PRESSURE: 36 MMHG | SYSTOLIC BLOOD PRESSURE: 107 MMHG

## 2019-06-07 VITALS — DIASTOLIC BLOOD PRESSURE: 60 MMHG | SYSTOLIC BLOOD PRESSURE: 111 MMHG

## 2019-06-07 VITALS — DIASTOLIC BLOOD PRESSURE: 48 MMHG | SYSTOLIC BLOOD PRESSURE: 98 MMHG

## 2019-06-07 VITALS — DIASTOLIC BLOOD PRESSURE: 71 MMHG | SYSTOLIC BLOOD PRESSURE: 121 MMHG

## 2019-06-07 VITALS — SYSTOLIC BLOOD PRESSURE: 109 MMHG | DIASTOLIC BLOOD PRESSURE: 53 MMHG

## 2019-06-07 VITALS — DIASTOLIC BLOOD PRESSURE: 61 MMHG | SYSTOLIC BLOOD PRESSURE: 103 MMHG

## 2019-06-07 VITALS — SYSTOLIC BLOOD PRESSURE: 96 MMHG | DIASTOLIC BLOOD PRESSURE: 39 MMHG

## 2019-06-07 VITALS — SYSTOLIC BLOOD PRESSURE: 102 MMHG | DIASTOLIC BLOOD PRESSURE: 49 MMHG

## 2019-06-07 VITALS — DIASTOLIC BLOOD PRESSURE: 46 MMHG | SYSTOLIC BLOOD PRESSURE: 105 MMHG

## 2019-06-07 VITALS — SYSTOLIC BLOOD PRESSURE: 107 MMHG | DIASTOLIC BLOOD PRESSURE: 57 MMHG

## 2019-06-07 VITALS — DIASTOLIC BLOOD PRESSURE: 61 MMHG | SYSTOLIC BLOOD PRESSURE: 96 MMHG

## 2019-06-07 VITALS — DIASTOLIC BLOOD PRESSURE: 60 MMHG | SYSTOLIC BLOOD PRESSURE: 112 MMHG

## 2019-06-07 VITALS — SYSTOLIC BLOOD PRESSURE: 99 MMHG | DIASTOLIC BLOOD PRESSURE: 36 MMHG

## 2019-06-07 VITALS — DIASTOLIC BLOOD PRESSURE: 69 MMHG | SYSTOLIC BLOOD PRESSURE: 105 MMHG

## 2019-06-07 VITALS — SYSTOLIC BLOOD PRESSURE: 100 MMHG | DIASTOLIC BLOOD PRESSURE: 58 MMHG

## 2019-06-07 VITALS — SYSTOLIC BLOOD PRESSURE: 91 MMHG | DIASTOLIC BLOOD PRESSURE: 45 MMHG

## 2019-06-07 VITALS — DIASTOLIC BLOOD PRESSURE: 27 MMHG | SYSTOLIC BLOOD PRESSURE: 109 MMHG

## 2019-06-07 VITALS — SYSTOLIC BLOOD PRESSURE: 102 MMHG | DIASTOLIC BLOOD PRESSURE: 36 MMHG

## 2019-06-07 VITALS — DIASTOLIC BLOOD PRESSURE: 59 MMHG | SYSTOLIC BLOOD PRESSURE: 105 MMHG

## 2019-06-07 VITALS — SYSTOLIC BLOOD PRESSURE: 93 MMHG | DIASTOLIC BLOOD PRESSURE: 48 MMHG

## 2019-06-07 VITALS — DIASTOLIC BLOOD PRESSURE: 63 MMHG | SYSTOLIC BLOOD PRESSURE: 104 MMHG

## 2019-06-07 LAB
ANION GAP SERPL CALC-SCNC: 11 MMOL/L (ref 7–16)
BUN SERPL-MCNC: 46 MG/DL (ref 7–18)
CALCIUM SERPL-MCNC: 7.6 MG/DL (ref 8.5–10.1)
CHLORIDE SERPL-SCNC: 115 MMOL/L (ref 98–107)
CO2 SERPL-SCNC: 19 MMOL/L (ref 21–32)
CREAT SERPL-MCNC: 0.8 MG/DL (ref 0.6–1.3)
GLUCOSE SERPL-MCNC: 134 MG/DL (ref 70–99)
HCT VFR BLD CALC: 21.5 % (ref 37–47)
HGB BLD-MCNC: 7.1 GM/DL (ref 12–15)
MCH RBC QN AUTO: 28.7 PG (ref 26–34)
MCHC RBC AUTO-ENTMCNC: 32.9 G/DL (ref 28–37)
MCV RBC: 87.1 FL (ref 80–100)
MPV: 8.8 FL. (ref 7.2–11.1)
PLATELET COUNT*: 30 THOU/UL (ref 150–400)
POTASSIUM SERPL-SCNC: 4.4 MMOL/L (ref 3.5–5.1)
RBC # BLD AUTO: 2.47 MIL/UL (ref 4.2–5)
RDW-CV: 18.6 % (ref 10.5–14.5)
SODIUM SERPL-SCNC: 145 MMOL/L (ref 136–145)
WBC # BLD AUTO: 12.3 THOU/UL (ref 4–11)

## 2019-06-07 NOTE — NUR
PT KNOWN TO CASE MGT FROM PREVIOUS ADMISSION. SHE LIVES AT HOME WITH HER
 AND DAUGHTERS. SHE HAS BEEN GETTING 'NATURAL' TREATMENTS FOR HER
CANCER. SPOKE WITH PT, SHE SAID SHE HAD BEEN MANAGING FAIRLY WELL AT HOME. SHE
GETS AROUND THE HOUSE WITH HER WALKER, SHE HAS A BEDSIDE COMMODE. SHE HAD HOME
HEALTH FROM [facility] AFTER LAST HOSPITAL STAY BUT THEY ARE NO LONGER COMING.
PT PLANS ON RETURNING HOME WITH FAMILY. DISCUSSED ROLE OF JUS JOSHI, WILL
CONTINUE TO FOLLOW.

## 2019-06-07 NOTE — NUR
Nutrition: pt screnn for "pressure wound."  However, no documentation of
wound was found on this admit.  Nsg reports pt has scars from old wound but
nothing now.  Pt borderline underweight per BMI.  Pt is NPO.

## 2019-06-07 NOTE — NUR
PT. HAD ONE LARGE DARK MAROON LIQUID BM THIS SHIFT.  LEVOPHED GTT TURNED OFF
AT 0330.  PT. UP TO BSC AFTER LEVOPHED GTT TURNED OFF.  PT. REFUSES TO LAY ON
BACK, AGREED TO BE TURNED TO RIGHT SIDE ONCE THIS SHIFT. PT. FREQUENTLY ASKED
FOR DAUGHTER TO COME AND STAY IN ROOM WITH HER, EDUCATED ON VISITING HOURS AND
INFORMED PT. THAT HER DTR WAS IN THE WAITING ROOM.  REQUESTED SOMETHING TO
HELP HER SLEEP, SHE STATED AT HOME HER " MASSAGES HER TO SLEEP" EVERY
NIGHT. PT. VERY RESTLESS.  CALL LIGHT REMAINS IN REACH, WILL CONTINUE TO
MONITOR.

## 2019-06-07 NOTE — NUR
SOME PROGRESSION TOWARDS GOALS. TWO UNITS OF PLATELETS INFUSED. EGD COMPLETED
AT NOON. ABDOMINAL CT COMPLETED. LIQUID DIET RESUMED. DENIES NAUSEA. HAS
RESTLESS LEGS AND FAMILY HAS BROUGHT HER TENS UNIT FROM HOME. OFF OF PROTONIX
AND OCTREOTIDE DRIPS. ORAL MEDS RESUMED. MANY VISITORS. REMAINS OFF PRESSORS

## 2019-06-08 VITALS — DIASTOLIC BLOOD PRESSURE: 54 MMHG | SYSTOLIC BLOOD PRESSURE: 103 MMHG

## 2019-06-08 VITALS — SYSTOLIC BLOOD PRESSURE: 121 MMHG | DIASTOLIC BLOOD PRESSURE: 61 MMHG

## 2019-06-08 VITALS — SYSTOLIC BLOOD PRESSURE: 119 MMHG | DIASTOLIC BLOOD PRESSURE: 63 MMHG

## 2019-06-08 VITALS — DIASTOLIC BLOOD PRESSURE: 40 MMHG | SYSTOLIC BLOOD PRESSURE: 100 MMHG

## 2019-06-08 VITALS — DIASTOLIC BLOOD PRESSURE: 58 MMHG | SYSTOLIC BLOOD PRESSURE: 108 MMHG

## 2019-06-08 VITALS — SYSTOLIC BLOOD PRESSURE: 94 MMHG | DIASTOLIC BLOOD PRESSURE: 41 MMHG

## 2019-06-08 VITALS — SYSTOLIC BLOOD PRESSURE: 114 MMHG | DIASTOLIC BLOOD PRESSURE: 66 MMHG

## 2019-06-08 VITALS — SYSTOLIC BLOOD PRESSURE: 114 MMHG | DIASTOLIC BLOOD PRESSURE: 73 MMHG

## 2019-06-08 VITALS — SYSTOLIC BLOOD PRESSURE: 107 MMHG | DIASTOLIC BLOOD PRESSURE: 60 MMHG

## 2019-06-08 VITALS — SYSTOLIC BLOOD PRESSURE: 110 MMHG | DIASTOLIC BLOOD PRESSURE: 62 MMHG

## 2019-06-08 VITALS — SYSTOLIC BLOOD PRESSURE: 122 MMHG | DIASTOLIC BLOOD PRESSURE: 53 MMHG

## 2019-06-08 VITALS — DIASTOLIC BLOOD PRESSURE: 50 MMHG | SYSTOLIC BLOOD PRESSURE: 109 MMHG

## 2019-06-08 VITALS — SYSTOLIC BLOOD PRESSURE: 100 MMHG | DIASTOLIC BLOOD PRESSURE: 49 MMHG

## 2019-06-08 VITALS — SYSTOLIC BLOOD PRESSURE: 104 MMHG | DIASTOLIC BLOOD PRESSURE: 55 MMHG

## 2019-06-08 VITALS — DIASTOLIC BLOOD PRESSURE: 48 MMHG | SYSTOLIC BLOOD PRESSURE: 98 MMHG

## 2019-06-08 VITALS — SYSTOLIC BLOOD PRESSURE: 121 MMHG | DIASTOLIC BLOOD PRESSURE: 63 MMHG

## 2019-06-08 VITALS — SYSTOLIC BLOOD PRESSURE: 105 MMHG | DIASTOLIC BLOOD PRESSURE: 58 MMHG

## 2019-06-08 LAB
ABSOLUTE EOSINOPHILS: 0.1 THOU/UL (ref 0–0.7)
ABSOLUTE MONOCYTES: 0.7 THOU/UL (ref 0–1.2)
ANISOCYTOSIS BLD QL SMEAR: (no result)
EOSINOPHIL NFR BLD: 1 %
GRANULOCYTES NFR BLD MANUAL: 74 %
HCT VFR BLD CALC: 20.4 % (ref 37–47)
HCT VFR BLD CALC: 23.1 % (ref 37–47)
HGB BLD-MCNC: 6.6 GM/DL (ref 12–15)
HGB BLD-MCNC: 7.4 GM/DL (ref 12–15)
LYMPHOCYTES # BLD: 1.1 THOU/UL (ref 0.8–5.3)
LYMPHOCYTES NFR BLD AUTO: 11 %
MCH RBC QN AUTO: 28.8 PG (ref 26–34)
MCHC RBC AUTO-ENTMCNC: 32.5 G/DL (ref 28–37)
MCV RBC: 88.7 FL (ref 80–100)
METAMYELOCYTES NFR BLD: 1 %
MONOCYTES NFR BLD: 7 %
MPV: 11 FL. (ref 7.2–11.1)
MYELOCYTES NFR BLD: 1 %
NEUTROPHILS # BLD: 8.2 THOU/UL (ref 1.6–8.1)
NEUTS BAND NFR BLD: 5 %
NUCLEATED RBCS: 2 /100WBC
OVALOCYTES BLD QL SMEAR: (no result)
PLATELET # BLD EST: (no result) 10*3/UL
PLATELET COUNT*: 51 THOU/UL (ref 150–400)
POIKILOCYTOSIS BLD QL SMEAR: (no result)
POLYCHROMASIA BLD QL SMEAR: (no result)
RBC # BLD AUTO: 2.3 MIL/UL (ref 4.2–5)
RDW-CV: 18.9 % (ref 10.5–14.5)
TEARDROPS: (no result)
TOXIC GRANULES BLD QL SMEAR: (no result)
WBC # BLD AUTO: 10.1 THOU/UL (ref 4–11)

## 2019-06-08 NOTE — NUR
AT END OF BLOOD TRANSFUSION TEMP 100.3.  NOTIFIED. TEMP RECHECKED AND TEMP
98.2.  RECEIVED ORDERS TO NOTIFY DR WHEN TEMP >101.   NOTIFIED THAT PT ALSO
HAS RESPIRATIONS 25-32. RECEIVED ORDERS FOR LASIX.

## 2019-06-08 NOTE — NUR
PT UP MULTIPLE TIMES THIS SHIFT TO [facility]. PT APPEARS TIRED AND WEAK. BEDPAN
OFFERED AND PT STATED NO. CATHETER OFFERED AT END OF SHIFT AND PT STATED NO.
HEMOGLOBIN RECHECK AFTER 2 BLOOD TRANSFUSIONS AND 7.4. NO BLOODY BMS THIS
SHIFT. PT HAS HAD 3 DARK BROWN STOOLS AND 3-4 SMEARS OF DARK BROWN STOOL.

## 2019-06-08 NOTE — NUR
ASSUMED CARE AT 1910H.ON NC AT 2LPM.NO HYPOTENSION AND TACHYCARDIA NOTED.NO
ACTIVE BLEEDING NOTE, BM WITH SMALL [place] DARK BROWN.HGB 6.6, INFORM GI
DOCTOR WITH TO OF 2 UNITS PRBC.1ST UNIT OF PRBC ONGOING.CONTINUE CARE AND
MONITOR VS AND REACTION FROM BLOOD.

## 2019-06-08 NOTE — NUR
ASSUMED PT 0730. PT A/O X'S 4. NO C/O PAIN. ONE SMALL DARK BROWN STOOL. PT UP
TO BSC . NO S/S OF BLEEING. PT RECEIVING BLOOD TRANSFUSION PER ORDERS. PT
ENCOURAGED TO SIT IN RECLINER. RECLINER PLACED IN PT'S ROOM.  PT ASKING TO
EAT. PT EDUCATED ON CLEAR LIQUID DIET.

## 2019-06-08 NOTE — NUR
PT TRANSFER FROM ICU. RECEIVED REPORT FROM ALIREZA VILLALOBOS, I REVIEWED AGREE WITH
HER ASSESSMENT. VSS. TELE IN PLACED. GET SITUATED TO ROOM. HOURLY ROUNDING.
CALL LIGHT WITHIN REACH.  WILL CONTINUE TO MONITOR.

## 2019-06-08 NOTE — NUR
DISCUSSED WITH PT BATHROOM OPTIONS. PT APPEARS TIRED FROM GETTING UP
FREQUENTLY TO BSC. DISCUSSED TOBIAS CATHETER AND BED PAN. PT AGREEABLE TO USE
BED PAN AT THIS TIME.

## 2019-06-09 VITALS — SYSTOLIC BLOOD PRESSURE: 110 MMHG | DIASTOLIC BLOOD PRESSURE: 46 MMHG

## 2019-06-09 VITALS — DIASTOLIC BLOOD PRESSURE: 48 MMHG | SYSTOLIC BLOOD PRESSURE: 120 MMHG

## 2019-06-09 VITALS — SYSTOLIC BLOOD PRESSURE: 123 MMHG | DIASTOLIC BLOOD PRESSURE: 73 MMHG

## 2019-06-09 VITALS — SYSTOLIC BLOOD PRESSURE: 114 MMHG | DIASTOLIC BLOOD PRESSURE: 58 MMHG

## 2019-06-09 LAB
ABSOLUTE BASOPHILS: 0.1 THOU/UL (ref 0–0.2)
ABSOLUTE EOSINOPHILS: 0.1 THOU/UL (ref 0–0.7)
ABSOLUTE MONOCYTES: 0.6 THOU/UL (ref 0–1.2)
BASOPHILS NFR BLD AUTO: 1.4 %
EOSINOPHIL NFR BLD: 1.2 %
GRANULOCYTES NFR BLD MANUAL: 82.9 %
HCT VFR BLD CALC: 23.1 % (ref 37–47)
HGB BLD-MCNC: 7.6 GM/DL (ref 12–15)
LYMPHOCYTES # BLD: 0.7 THOU/UL (ref 0.8–5.3)
LYMPHOCYTES NFR BLD AUTO: 8.2 %
MCH RBC QN AUTO: 28.5 PG (ref 26–34)
MCHC RBC AUTO-ENTMCNC: 32.9 G/DL (ref 28–37)
MCV RBC: 86.7 FL (ref 80–100)
MONOCYTES NFR BLD: 6.3 %
MPV: 11.5 FL. (ref 7.2–11.1)
NEUTROPHILS # BLD: 7.4 THOU/UL (ref 1.6–8.1)
NUCLEATED RBCS: 1 /100WBC
PLATELET COUNT*: 39 THOU/UL (ref 150–400)
RBC # BLD AUTO: 2.66 MIL/UL (ref 4.2–5)
RDW-CV: 18 % (ref 10.5–14.5)
WBC # BLD AUTO: 8.9 THOU/UL (ref 4–11)

## 2019-06-09 NOTE — NUR
RECEIVED REPORT AND ASSUMED CARE AT 0700. VSS. CARDIAC MONITORING IN PLACE. PT
DENIES COMPLAINTS OF PAIN. ASSESSMENT COMPLETED AS CHARTED. PT UP WITH ASSIST
TO BSC. ON 3L NC. BED LOCKED IN LOWEST POSITION, CALL LIGHT WITHIN REACH.
POSITION CHANGED EVERY TWO HOURS AND PRN. HOURLY ROUNDING COMPLETED AND ALL
NEEDS MET.

## 2019-06-09 NOTE — NUR
PT SLEPT OFF AND ON OVERNIGHT, RESTLESS PER FAMILY BUT DENIES NEEDS TO STAFF.
FAMILY ATTENTIVE AT BEDSIDE WITH BACK RUBS, SOOTHING MUSIC AND REPOSITIONING
AS NEEDED. IVF INFUSING TO R PICC, UP WITH ASSIST TO VOID BSC, NO BMS
REPORTED. AM LABS DRAWN. FREQUENT REPOSITIONING PER FAMILY FOR COMFORT. O2 3L.
TELE SR PVC. ABLE TO USE CALL LITE AND MAKE NEEDS KNOWN. PT REQUEST INCREASE
DOSE OF LEVOTHYROXINE, DR NOTIFIED AND MED GIVEN. NO BLEEDING NOTED OVERNIGHT.

## 2019-06-10 VITALS — SYSTOLIC BLOOD PRESSURE: 110 MMHG | DIASTOLIC BLOOD PRESSURE: 61 MMHG

## 2019-06-10 VITALS — DIASTOLIC BLOOD PRESSURE: 71 MMHG | SYSTOLIC BLOOD PRESSURE: 119 MMHG

## 2019-06-10 VITALS — DIASTOLIC BLOOD PRESSURE: 64 MMHG | SYSTOLIC BLOOD PRESSURE: 116 MMHG

## 2019-06-10 VITALS — SYSTOLIC BLOOD PRESSURE: 110 MMHG | DIASTOLIC BLOOD PRESSURE: 56 MMHG

## 2019-06-10 LAB
ABSOLUTE BASOPHILS: 0.1 THOU/UL (ref 0–0.2)
ABSOLUTE EOSINOPHILS: 0.2 THOU/UL (ref 0–0.7)
ABSOLUTE MONOCYTES: 0.6 THOU/UL (ref 0–1.2)
ANION GAP SERPL CALC-SCNC: 10 MMOL/L (ref 7–16)
BASOPHILS NFR BLD AUTO: 1.2 %
BUN SERPL-MCNC: 24 MG/DL (ref 7–18)
CALCIUM SERPL-MCNC: 7.8 MG/DL (ref 8.5–10.1)
CHLORIDE SERPL-SCNC: 112 MMOL/L (ref 98–107)
CO2 SERPL-SCNC: 20 MMOL/L (ref 21–32)
CREAT SERPL-MCNC: 0.6 MG/DL (ref 0.6–1.3)
EOSINOPHIL NFR BLD: 1.7 %
GLUCOSE SERPL-MCNC: 81 MG/DL (ref 70–99)
GRANULOCYTES NFR BLD MANUAL: 81.2 %
HCT VFR BLD CALC: 23.2 % (ref 37–47)
HGB BLD-MCNC: 7.5 GM/DL (ref 12–15)
LYMPHOCYTES # BLD: 0.9 THOU/UL (ref 0.8–5.3)
LYMPHOCYTES NFR BLD AUTO: 9.7 %
MCH RBC QN AUTO: 28.7 PG (ref 26–34)
MCHC RBC AUTO-ENTMCNC: 32.4 G/DL (ref 28–37)
MCV RBC: 88.5 FL (ref 80–100)
MONOCYTES NFR BLD: 6.2 %
MPV: 11.8 FL. (ref 7.2–11.1)
NEUTROPHILS # BLD: 7.3 THOU/UL (ref 1.6–8.1)
NUCLEATED RBCS: 1 /100WBC
PLATELET COUNT*: 37 THOU/UL (ref 150–400)
POTASSIUM SERPL-SCNC: 3.1 MMOL/L (ref 3.5–5.1)
RBC # BLD AUTO: 2.62 MIL/UL (ref 4.2–5)
RDW-CV: 18.8 % (ref 10.5–14.5)
SODIUM SERPL-SCNC: 142 MMOL/L (ref 136–145)
WBC # BLD AUTO: 9 THOU/UL (ref 4–11)

## 2019-06-10 NOTE — NUR
REST/EXERCISE PULSE OX COMPLETED AT APPROX 1845 THIS SHIFT DUE TO PT'S
MULTIPLE REQUESTS FOR RESPIRATORY THERAPIST TO COME BACK AT LATER TIMES. PT
DOES QUALIFY FOR O2 AT REST AND WITH ACTIVITY. ORDERS FAXED TO [facility] AT 1900
AND CALL PLACED TO SALVADOR WITH [facility] AT 1915-VOICE MAIL LEFT WITH CALL BACK
NUMBER.

## 2019-06-10 NOTE — NUR
DISCHARGE PLANNER SPOKE TO THE PATIENT AND SPOUSE TO DISCUSS DISCHARGE
PLANNING, HH, AND A F/U APPOINTMENT WITH GI. PATIENT DECLINED HH, DESPITE
EDUCATION AND ENCOURAGEMENT. PATIENT'S SPOUSE INFORMS THAT THE PATIENT 'WAS
SUPPOSED TO HAVE A F/U APPOINTMENT SCHEDULED WITH [facility].  D/C
PLANNER ATTEMPTED TO SCHEDULE A F/U APPOINTMENT FOR THE PATIENT WITH GI AT
[facility], AND WAS INFORMED BY SCHEDULING THAT THE PATIENT HAD AN
APPOINTMENT SCHEDULED, BUT DECLINED THE VISIT AS SHE WAS UNABLE TO AFFORD THE
$1OO CO-PAY (DUE TO ONLY HAVE MEDICARE PART A INSURANCE), AND THE PATIENT
WOULD NEED TO PAY THIS PRIOR TO HAVING THE APPOINTMENT SCHEDULED AGAIN.
PATIENT AND SPOUSE INFORMED OF THIS INFO, AND THAT THEY ARE IN AGREEMENT. CM
WILL REMAIN AVAILABLE TO ASSIST AND FOLLOW AS NEEDED.
 
[facility]  PHONE: [phone]

## 2019-06-10 NOTE — NUR
PT ALERT AND ORIENTED. TELE TRACKING SR AND ALL VSS ON 3L. DENIES CP, SOA, BUT
DOES STATE NOSE FEELS CONGESTED, THUS IS MOUTH BREATHING- HUMIDITY ADDED.
C/O GENERALIZED WEAKNESS AND FATIGUE. HAD BROWN WATERY BM X1 SO FAR THIS
SHIFT. FAMILY AT BEDSIDE. EDUCATED ON SAFETY AND PLAN OF CARE. PLEASE SEE
ASSESSMENT FOR ADDITIONAL INFORMATION. WILL CONT TO MONITOR

## 2019-06-10 NOTE — NUR
ASSUMED CARE OF PT AT 1900. PT IS ALERT AND ORIENTED. VSS. PERRLA. NO
COMPLAINTS OF PAIN. NO BLOODY STOOLS. PT IS ON 3 LITERS OF O2. PT IS IN SINUS
RYTHM ON THE TELEMETRY. PT IS RESTING COMFORTABLY IN BED. RESPIRATIONS ARE
EVEN AND NONLABORED. WILL CONTINUE TO MONITOR PT.

## 2019-06-11 VITALS — DIASTOLIC BLOOD PRESSURE: 60 MMHG | SYSTOLIC BLOOD PRESSURE: 116 MMHG

## 2019-06-11 VITALS — SYSTOLIC BLOOD PRESSURE: 116 MMHG | DIASTOLIC BLOOD PRESSURE: 54 MMHG

## 2019-06-11 VITALS — DIASTOLIC BLOOD PRESSURE: 54 MMHG | SYSTOLIC BLOOD PRESSURE: 99 MMHG

## 2019-06-11 VITALS — DIASTOLIC BLOOD PRESSURE: 70 MMHG | SYSTOLIC BLOOD PRESSURE: 116 MMHG

## 2019-06-11 VITALS — DIASTOLIC BLOOD PRESSURE: 57 MMHG | SYSTOLIC BLOOD PRESSURE: 104 MMHG

## 2019-06-11 LAB
ABSOLUTE BASOPHILS: 0.4 THOU/UL (ref 0–0.2)
ABSOLUTE EOSINOPHILS: 0.3 THOU/UL (ref 0–0.7)
ABSOLUTE MONOCYTES: 0.8 THOU/UL (ref 0–1.2)
ANION GAP SERPL CALC-SCNC: 10 MMOL/L (ref 7–16)
ANISOCYTOSIS BLD QL SMEAR: (no result)
BASOPHILS NFR BLD AUTO: 3 %
BUN SERPL-MCNC: 18 MG/DL (ref 7–18)
CALCIUM SERPL-MCNC: 8.3 MG/DL (ref 8.5–10.1)
CHLORIDE SERPL-SCNC: 110 MMOL/L (ref 98–107)
CO2 SERPL-SCNC: 20 MMOL/L (ref 21–32)
CREAT SERPL-MCNC: 0.6 MG/DL (ref 0.6–1.3)
EOSINOPHIL NFR BLD: 2 %
GLUCOSE SERPL-MCNC: 90 MG/DL (ref 70–99)
GRANULOCYTES NFR BLD MANUAL: 75 %
HCT VFR BLD CALC: 25.7 % (ref 37–47)
HGB BLD-MCNC: 8.3 GM/DL (ref 12–15)
LG PLATELETS BLD QL SMEAR: (no result)
LYMPHOCYTES # BLD: 1.4 THOU/UL (ref 0.8–5.3)
LYMPHOCYTES NFR BLD AUTO: 11 %
MCH RBC QN AUTO: 28.3 PG (ref 26–34)
MCHC RBC AUTO-ENTMCNC: 32.2 G/DL (ref 28–37)
MCV RBC: 87.9 FL (ref 80–100)
MONOCYTES NFR BLD: 6 %
MPV: 11.5 FL. (ref 7.2–11.1)
NEUTROPHILS # BLD: 9.9 THOU/UL (ref 1.6–8.1)
NEUTS BAND NFR BLD: 3 %
NUCLEATED RBCS: 0 /100WBC
OVALOCYTES BLD QL SMEAR: (no result)
PLATELET # BLD EST: (no result) 10*3/UL
PLATELET COUNT*: 44 THOU/UL (ref 150–400)
POIKILOCYTOSIS BLD QL SMEAR: (no result)
POLYCHROMASIA BLD QL SMEAR: (no result)
POTASSIUM SERPL-SCNC: 4.1 MMOL/L (ref 3.5–5.1)
RBC # BLD AUTO: 2.93 MIL/UL (ref 4.2–5)
RDW-CV: 19.1 % (ref 10.5–14.5)
SCHISTOCYTES BLD QL SMEAR: (no result)
SODIUM SERPL-SCNC: 140 MMOL/L (ref 136–145)
WBC # BLD AUTO: 12.7 THOU/UL (ref 4–11)

## 2019-06-11 NOTE — NUR
ASSUMED CARE OF PT AROUND 0730 THIS AM. REFER TO ASSESSMENT. PT AFIB WITH RATE
AROUND 114 THIS AM. PT GETTING DIGOXIN LOAD TODAY. PT'S DAUGHTER REFUSED ORAL
LOW DOSE METOPROLOL D/T HISTORY WITH HYPOTENSION WITH PREVIOUS
ADMINISTRATIONS. NO OTHER CONCERNS AT THIS TIME. CLWR. WCTM.

## 2019-06-11 NOTE — NUR
PATIENT DISCHARGE WAS PLACED ON HOLD D/T CHANGE IN STATUS. PATIENT WILL NOW
NEED HOME OXYGEN AT D/C. ATTEMPTED TO ARRANGE WITH [facility], BUT THE COST
($241.60/MONTH) IS UNAFFORDABLE ACCORDING TO THE PATEINT'S DTR. D/C PLANNER
PRICED HOME OXYGEN WITH MULTIPLE OTHER COMPANIES AND FOUND THAT [facility]
PATIENT WILL PROVIDE HOME OXYGEN ($107/MONTH) WITH CONCENTRATOR, TANK, AND
SUPPLIES. D/C PLANNER INFORMED PATIENT'S DTR CHRISTY OF THE COST WITH [facility] AND
SHE IS IN AGREEMENT WITH THAT COST. D/C PLANNER FAXED PATIENT'S FACESHEET,
H&P, OXYGEN ORDERS, AND TESTING TO [facility]. PHYSICIAN IN-CHARGE OF THE PATIENT
INFORMS THAT THE PATIENT WILL REMAIN IN THE HOSPITAL TODAY, BUT MAY D/C
TOMORROW. CM WILL REMAIN AVAILABLE TO ASSIST AND FOLLOW AS NEEDED.
 
[facility] PATIENT  PHONE:  [phone]  FAX:  [phone]

## 2019-06-11 NOTE — NUR
ASSUMED CARE OF PT AT 1900. PT IS ALERT AND ORIENTED. VSS. PERRLA. NO
COMPLAINTS OF PAIN. UP WITH 1 ASSIST. PT IS IN A FIB ON THE TELEMETRY. PT IS
RESTING COMFORTABLY IN BED. RESPIRATIONS ARE EVEN AND NONLABORED. WILL
CONTINUE TO MONITOR PT.

## 2019-06-11 NOTE — NUR
PT SOMEWHAT PROGRESSING TOWARDS GOALS THIS SHIFT. TELE CONTINUES AFIB WITH
RATE IN THE 90'S. PT AND DAUGHTER REFUSED LOW DOSE METOPROLOL FOR RATE CONTROL
REPORTING THAT IT ALWAYS LOWERS HER BLOOD PRESSURE TOO LOW WITH PREVIOUS
ADMINISTRATIONS. ATTEMPTING TO CONTROL RATE WITH DIGOXIN LOAD. WILL ASSESS FOR
APPROPRIATENESS OF DC HOME TOMORROW. NO OTHER CONCERNS AT THIS TIME. CLWR.
WCTM.

## 2019-06-11 NOTE — EKG
[place] [address]
Phone:  [phone]                     ELECTROCARDIOGRAM REPORT      
_______________________________________________________________________________
 
Name:       CHRISTY LR                 Room:           [address]    ADM IN  
[facility].#:  [id number]      Account #:      [id number]  
Admission:  19     Attend Phys:    JANNY Rizvi
Discharge:               Date of Birth:  [birth year]  
         Report #: [id number]
    [id number]
_______________________________________________________________________________
THIS REPORT FOR:  //name//                      
 
                          [facility]
                                       
Test Date:    2019               Test Time:    05:47:29
Pat Name:     CHRISTY LR             Department:   
Patient ID:   [id number]            Room:         [address]
Gender:       F                        Technician:   DOMINGUEZ
:          1940               Requested By: Junior Hagen
Order Number: [id number]    Isaiah MD:   Tacho Ortega
                                 Measurements
Intervals                              Axis          
Rate:         123                      P:            
CO:                                    QRS:          54
QRSD:         127                      T:            197
QT:           315                                    
QTc:          451                                    
                           Interpretive Statements
Atrial fibrillation
Nonspecific intraventricular conduction delay
Repol abnrm suggests ischemia, anterolateral
Compared to ECG 2019 11:58:58
Early repolarization now present
Possible ischemia now present
Sinus rhythm no longer present
T-wave abnormality no longer present
 
Electronically Signed On 2019 15:43:00 CDT by Tacho Ortega
[email or web address]
 
 
 
 
 
 
 
 
 
 
 
 
 
 
  <ELECTRONICALLY SIGNED>
                                           By: Tacho Ortega MD, FAC   
  19     1543
D: 19 0547   _____________________________________
T: 19 0547   Tacho Ortega MD, FAC     /EPI

## 2019-06-12 VITALS — DIASTOLIC BLOOD PRESSURE: 69 MMHG | SYSTOLIC BLOOD PRESSURE: 113 MMHG

## 2019-06-12 VITALS — DIASTOLIC BLOOD PRESSURE: 60 MMHG | SYSTOLIC BLOOD PRESSURE: 105 MMHG

## 2019-06-12 VITALS — SYSTOLIC BLOOD PRESSURE: 101 MMHG | DIASTOLIC BLOOD PRESSURE: 43 MMHG

## 2019-06-12 VITALS — SYSTOLIC BLOOD PRESSURE: 108 MMHG | DIASTOLIC BLOOD PRESSURE: 40 MMHG

## 2019-06-12 NOTE — NUR
DISCHARGE PLAN DISCUSSED WITH PT AND .  TELE, IV PICC LINE REMOVED.
MEDICATION PACKET/SCRIPT GIVEN. REMINDED TO FOLLOW UP WITH PCP, CARDIOLOGY,
GI. ALL BELONGINGS PACKED AND CHECKED. LEFT THE UNIT WITH VIA WHEELCHAIR, O2
1840.

## 2019-06-12 NOTE — NUR
DC orders written. PAYTON spoke with Myra, at [facility] regarding o2,
referral was not received yesterday, CM refaxed, asked nurse to order a new ex
ox, anticipate that [facility] will need an updated testing. Myra to
contact Pt's dtr regarding payment, and will call Cm back with tank delivery
time. CM updated Pt and . CM inqired about HH at dc, Pt declined. One
of Pt's children will provide dc transportation.

## 2019-06-12 NOTE — NUR
ASSUMED PT CARE AT 0800. AOX4, UP WITH ASSIST. O2 SAT AT 90'S 2L O2 SAT.
TRACING AFIB ON MONITOR. PT FOR DISCHARGE TODAY. PT DENIES PT. PT ABDOMEN
SISTENDED. LAST BM 6/10/19/. PT HAS PICC LINE INTACT. IV AT R FOREARM INTACT.
PT LOWER EXTREMITY EDEMA NOTED. MEDS GIVEN PER MAR. VSS, AM ASSESSMENT AS
CHARTED. HOURLY ROUNDING. CALL LIGHT WITHIN REACH. WILL CONTINUE TO MONITOR.

## 2019-06-12 NOTE — NUR
ASSUMED CARE OF PT AT 1900. PT IS ALERT AND ORIENTED. VSS. PERRLA. NO
COMPLAINTS OF PAIN. PT IS IN A FIB ON THE TELEMETRY. PT IS RESTING COMFORTABLY
IN BED. RESPIRATIONS ARE EVEN AND NONLABORED. WILL CONTNUE TO MONITOR PT.

## 2019-07-18 ENCOUNTER — HOSPITAL ENCOUNTER (INPATIENT)
Dept: HOSPITAL 96 - M.ERS | Age: 79
LOS: 6 days | Discharge: HOME HEALTH | DRG: 871 | End: 2019-07-24
Attending: INTERNAL MEDICINE | Admitting: INTERNAL MEDICINE
Payer: MEDICARE

## 2019-07-18 VITALS — DIASTOLIC BLOOD PRESSURE: 52 MMHG | SYSTOLIC BLOOD PRESSURE: 95 MMHG

## 2019-07-18 VITALS — BODY MASS INDEX: 19.63 KG/M2 | WEIGHT: 117.8 LBS | HEIGHT: 65 IN

## 2019-07-18 DIAGNOSIS — D46.9: ICD-10-CM

## 2019-07-18 DIAGNOSIS — Z79.899: ICD-10-CM

## 2019-07-18 DIAGNOSIS — E72.20: ICD-10-CM

## 2019-07-18 DIAGNOSIS — D63.1: ICD-10-CM

## 2019-07-18 DIAGNOSIS — Z88.0: ICD-10-CM

## 2019-07-18 DIAGNOSIS — N39.0: ICD-10-CM

## 2019-07-18 DIAGNOSIS — Z88.2: ICD-10-CM

## 2019-07-18 DIAGNOSIS — I48.91: ICD-10-CM

## 2019-07-18 DIAGNOSIS — B96.20: ICD-10-CM

## 2019-07-18 DIAGNOSIS — I85.10: ICD-10-CM

## 2019-07-18 DIAGNOSIS — A41.9: Primary | ICD-10-CM

## 2019-07-18 DIAGNOSIS — Z66: ICD-10-CM

## 2019-07-18 DIAGNOSIS — E03.9: ICD-10-CM

## 2019-07-18 DIAGNOSIS — R18.8: ICD-10-CM

## 2019-07-18 DIAGNOSIS — D68.59: ICD-10-CM

## 2019-07-18 DIAGNOSIS — K74.69: ICD-10-CM

## 2019-07-18 DIAGNOSIS — E43: ICD-10-CM

## 2019-07-18 DIAGNOSIS — K76.6: ICD-10-CM

## 2019-07-18 DIAGNOSIS — C95.90: ICD-10-CM

## 2019-07-18 DIAGNOSIS — I42.9: ICD-10-CM

## 2019-07-18 DIAGNOSIS — N18.3: ICD-10-CM

## 2019-07-18 DIAGNOSIS — I13.0: ICD-10-CM

## 2019-07-18 DIAGNOSIS — Z88.6: ICD-10-CM

## 2019-07-18 DIAGNOSIS — J96.00: ICD-10-CM

## 2019-07-18 DIAGNOSIS — I50.23: ICD-10-CM

## 2019-07-18 LAB
ALBUMIN SERPL-MCNC: 3.2 G/DL (ref 3.4–5)
ALP SERPL-CCNC: 74 U/L (ref 46–116)
ALT SERPL-CCNC: 16 U/L (ref 30–65)
ANION GAP SERPL CALC-SCNC: 13 MMOL/L (ref 7–16)
AST SERPL-CCNC: 19 U/L (ref 15–37)
BILIRUB SERPL-MCNC: 1 MG/DL
BUN SERPL-MCNC: 45 MG/DL (ref 7–18)
CALCIUM SERPL-MCNC: 8.9 MG/DL (ref 8.5–10.1)
CHLORIDE SERPL-SCNC: 106 MMOL/L (ref 98–107)
CO2 SERPL-SCNC: 20 MMOL/L (ref 21–32)
CREAT SERPL-MCNC: 1.1 MG/DL (ref 0.6–1.3)
GLUCOSE SERPL-MCNC: 129 MG/DL (ref 70–99)
HCT VFR BLD CALC: 30 % (ref 37–47)
HGB BLD-MCNC: 9 GM/DL (ref 12–15)
MAGNESIUM SERPL-MCNC: 2.5 MG/DL (ref 1.8–2.4)
MCH RBC QN AUTO: 31.1 PG (ref 26–34)
MCHC RBC AUTO-ENTMCNC: 30.1 G/DL (ref 28–37)
MCV RBC: 103.5 FL (ref 80–100)
MPV: 8.9 FL. (ref 7.2–11.1)
NUCLEATED RBCS: 1 /100WBC
PHOSPHATE SERPL-MCNC: 4.9 MG/DL (ref 2.5–4.9)
PLATELET COUNT*: 166 THOU/UL (ref 150–400)
POTASSIUM SERPL-SCNC: 4.7 MMOL/L (ref 3.5–5.1)
PROT SERPL-MCNC: 6.7 G/DL (ref 6.4–8.2)
RBC # BLD AUTO: 2.9 MIL/UL (ref 4.2–5)
RDW-CV: 22.6 % (ref 10.5–14.5)
SODIUM SERPL-SCNC: 139 MMOL/L (ref 136–145)
WBC # BLD AUTO: 15.3 THOU/UL (ref 4–11)

## 2019-07-18 NOTE — CON
[address]  [address]                    CONSULTATION                  
_______________________________________________________________________________
 
Name:       CHRISTY LR                 Room:           [address] [place]  
..#:  [id number]      Account #:      [id number]  
Admission:  07/18/19     Attend Phys:    Richar Hall MD 
Discharge:               Date of Birth:  [birth year]  
         Report #: [id number]
                                                                     [id number]  
_______________________________________________________________________________
THIS REPORT FOR:  //name//                      
 
CC: Richar Ibrahim MD
 
DICTATED BY: Goldie Meier [facility]
 
DATE OF SERVICE:  07/22/2019
 
 
REASON FOR CONSULTATION:  Recurrent ascites.
 
HISTORY OF PRESENT ILLNESS:  This is a 78-year-old female who presented to the
Emergency Room complaining of abdominal discomfort and distention, which had
worsened over the last 2 days.  In discussing with her , she was not
taking her 20 mg of Lasix on a regular basis.  She was taking her spironolactone
50 mg daily, but not her Lasix.  She would only take her Lasix whenever her feet
would swell, which was just once in a while, which likely has contributed to the
worsening of her ascites.  The patient was diagnosed in March of this year with
decompensated cirrhosis.  She had a GI bleed at that time and underwent variceal
banding in March x 4.  She had repeat EGD on 06/01 for similar findings, noting
severe portal hypertension, in which she did have some oozing of those and her
varices at that time were very small.  She also underwent a colonoscopy back in
March since she has never had one.  This showed some localized inflammation that
was negative with biopsy and nonthrombosed external hemorrhoids.  The patient
also has been diagnosed with untreated leukemia 2 years ago and does not want
any treatment for this and is more in the holistic nature of treatment.
 
ALLERGIES:  PENICILLIN, SULFA, AND CODEINE.
 
MEDICATIONS:  From home include spironolactone 50, Synthroid 200, midodrine,
vitamin D, and Lasix 20 mg was p.r.n.
 
PAST MEDICAL HISTORY:  Significant for anemia, ascites, cardiomyopathy with an
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
thrombocytopenia, and history of varices.
 
PAST SURGICAL HISTORY:  Negative.
 
FAMILY HISTORY:  Noncontributory.
 
SOCIAL HISTORY:  Denies any alcohol, tobacco or illegal drug use.
 
REVIEW OF SYSTEMS:  Twelve-point review of systems is essentially negative,
except what is mentioned in the HPI.
 
 
 
[place] [address]                    CONSULTATION                  
_______________________________________________________________________________
 
Name:       CHRISTY LR MONCHO                 Room:           [address] [place]  
[facility]#:  [id number]      Account #:      [id number]  
Admission:  07/18/19     Attend Phys:    Richar Hall MD 
Discharge:               Date of Birth:  [birth year]  
         Report #: [id number]
                                                                     [id number]  
_______________________________________________________________________________
 
PHYSICAL EXAMINATION:
VITAL SIGNS:  Temperature 36.9, pulse 104, respirations 16, blood pressure
82/47.
HEART:  Irregular rate and rhythm.
LUNGS:  Diminished bilaterally, more so on the right than the left.
ABDOMEN:  Soft, positive bowel sounds in all 4 quadrants, with no masses or
tenderness noted.
 
LABORATORY DATA:  Hemoglobin is 8.4, white count is 11.7, platelets 152.  PT
11.3, INR 1.1.  GFR is 48.  Total bilirubin 0.5, alkaline phosphatase 64, ALT
10, AST is 19.  Her ammonia level was 45, total protein 6.3, albumin was 2.8. 
The patient did undergo a paracentesis on the 19th and they pulled off 2370. 
She went down for a repeat this morning and there was minimal to remove.
 
IMPRESSION:
1.  Recurrent refractory ascites.
2.  History of cirrhosis of the liver.
3.  Esophageal varices.
4.  Leukemia.
5.  Urinary tract infection.
 
PLAN:
1.  Instructed , the patient, and daughter regarding taking Lasix and
spironolactone together on a regular basis and that was likely a contributing
factor for her reentering the hospital with an accumulation of ascetic fluid.
2.  We will decrease her lactulose down to 30 once daily and see how that helps
with her bowel movements.
3.  We will allow the patient to resume her diet.
4.  No further recommendations to be made regarding any adjustments in her
diuretics at this time.
 
Thank you for allowing us to participate in this patient's care.  Please do not
hesitate to call with any questions in regard to this consult.
 
 
 
 
 
 
 
 
 
 
                       
                                        By:                                
                 
D: 07/22/19 1148_______________________________________
T: 07/22/19 2214Nelson Dawson DO             /nt

## 2019-07-19 VITALS — SYSTOLIC BLOOD PRESSURE: 75 MMHG | DIASTOLIC BLOOD PRESSURE: 48 MMHG

## 2019-07-19 VITALS — SYSTOLIC BLOOD PRESSURE: 98 MMHG | DIASTOLIC BLOOD PRESSURE: 58 MMHG

## 2019-07-19 VITALS — DIASTOLIC BLOOD PRESSURE: 39 MMHG | SYSTOLIC BLOOD PRESSURE: 78 MMHG

## 2019-07-19 VITALS — SYSTOLIC BLOOD PRESSURE: 84 MMHG | DIASTOLIC BLOOD PRESSURE: 58 MMHG

## 2019-07-19 VITALS — SYSTOLIC BLOOD PRESSURE: 87 MMHG | DIASTOLIC BLOOD PRESSURE: 63 MMHG

## 2019-07-19 VITALS — DIASTOLIC BLOOD PRESSURE: 58 MMHG | SYSTOLIC BLOOD PRESSURE: 75 MMHG

## 2019-07-19 VITALS — DIASTOLIC BLOOD PRESSURE: 55 MMHG | SYSTOLIC BLOOD PRESSURE: 84 MMHG

## 2019-07-19 VITALS — SYSTOLIC BLOOD PRESSURE: 73 MMHG | DIASTOLIC BLOOD PRESSURE: 46 MMHG

## 2019-07-19 LAB
ABSOLUTE BASOPHILS: 0.2 THOU/UL (ref 0–0.2)
ABSOLUTE MONOCYTES: 0.9 THOU/UL (ref 0–1.2)
ANISOCYTOSIS BLD QL SMEAR: (no result)
APTT BLD: 29.7 SECONDS (ref 25–31.3)
BACTERIA-REFLEX: (no result) /HPF
BASOPHILS NFR BLD AUTO: 1 %
BF LYMPHOCYTES: 20 %
BF POLYS: 80 %
BF RBC: 3008 /MM3
BF TISSUE: 25 /100 WBC
BILIRUB UR-MCNC: NEGATIVE MG/DL
CLARITY UR: (no result)
COLOR UR: YELLOW
GRANULOCYTES NFR BLD MANUAL: 85 %
INR PPP: 1.1
KETONES UR STRIP-MCNC: NEGATIVE MG/DL
LYMPHOCYTES # BLD: 0.6 THOU/UL (ref 0.8–5.3)
LYMPHOCYTES NFR BLD AUTO: 4 %
MONOCYTES NFR BLD: 6 %
MUCUS: (no result) STRN/LPF
NEUTROPHILS # BLD: 13.6 THOU/UL (ref 1.6–8.1)
NEUTS BAND NFR BLD: 4 %
PLATELET # BLD EST: ADEQUATE 10*3/UL
POIKILOCYTOSIS BLD QL SMEAR: (no result)
PROT UR QL STRIP: (no result)
PROTHROMBIN TIME: 11.6 SECONDS (ref 9.2–11.5)
RBC # UR STRIP: (no result) /UL
RBC #/AREA URNS HPF: (no result) /HPF (ref 0–2)
SOURCE: (no result)
SP GR UR STRIP: 1.02 (ref 1–1.03)
SPECIMEN VOL 24H UR: 2370 ML
SQUAMOUS: (no result) /LPF (ref 0–3)
TOTAL CELL COUNT: 414 /MM3
TOXIC GRANULES BLD QL SMEAR: (no result)
URINE CLARITY: CLEAR
URINE GLUCOSE-RANDOM: NEGATIVE
URINE LEUKOCYTES-REFLEX: (no result)
URINE NITRITE-REFLEX: NEGATIVE
URINE WBC-REFLEX: (no result) /HPF (ref 0–5)
UROBILINOGEN UR STRIP-ACNC: 0.2 E.U./DL (ref 0.2–1)

## 2019-07-19 PROCEDURE — 0W9G3ZZ DRAINAGE OF PERITONEAL CAVITY, PERCUTANEOUS APPROACH: ICD-10-PCS

## 2019-07-19 NOTE — EKG
[place] [address]
Phone:  [phone]                     ELECTROCARDIOGRAM REPORT      
_______________________________________________________________________________
 
Name:       CHRISTY LR                 Room:           [address]    ADM IN  
.R.#:  [id number]      Account #:      [id number]  
Admission:  19     Attend Phys:    Richar Hall MD 
Discharge:               Date of Birth:  [birth year]  
         Report #: [id number]
    [id number]
_______________________________________________________________________________
THIS REPORT FOR:  //name//                      
 
                         [facility] ED
                                       
Test Date:    2019               Test Time:    23:14:35
Pat Name:     CHRISTY LR             Department:   
Patient ID:   [id number]            Room:         [facility]
Gender:       F                        Technician:   FL
:          1940               Requested By: Clover Rowe
Order Number: [id number] MD:   Nando Mckeon
                                 Measurements
Intervals                              Axis          
Rate:         126                      P:            
TX:                                    QRS:          20
QRSD:         125                      T:            132
QT:           331                                    
QTc:          480                                    
                           Interpretive Statements
Atrial fibrillation
LVH with secondary repolarization abnormality
Compared to ECG 2019 05:47:29
Left ventricular hypertrophy now present
 
Electronically Signed On 2019 16:44:08 CDT by Nando Mckeon
[email or web address]
 
 
 
 
 
 
 
 
 
 
 
 
 
 
 
 
 
 
  <ELECTRONICALLY SIGNED>
                                           By: Nando Mckeon MD, [facility]     
  19     1644
D: 19 2314   _____________________________________
T: 19 2314   Nando Mckeon MD, [facility]       /EPI

## 2019-07-20 VITALS — SYSTOLIC BLOOD PRESSURE: 111 MMHG | DIASTOLIC BLOOD PRESSURE: 57 MMHG

## 2019-07-20 VITALS — SYSTOLIC BLOOD PRESSURE: 125 MMHG | DIASTOLIC BLOOD PRESSURE: 84 MMHG

## 2019-07-20 VITALS — SYSTOLIC BLOOD PRESSURE: 76 MMHG | DIASTOLIC BLOOD PRESSURE: 46 MMHG

## 2019-07-20 VITALS — SYSTOLIC BLOOD PRESSURE: 75 MMHG | DIASTOLIC BLOOD PRESSURE: 30 MMHG

## 2019-07-20 VITALS — SYSTOLIC BLOOD PRESSURE: 101 MMHG | DIASTOLIC BLOOD PRESSURE: 56 MMHG

## 2019-07-20 VITALS — SYSTOLIC BLOOD PRESSURE: 86 MMHG | DIASTOLIC BLOOD PRESSURE: 48 MMHG

## 2019-07-20 LAB
ABSOLUTE BASOPHILS: 0.2 THOU/UL (ref 0–0.2)
ABSOLUTE EOSINOPHILS: 0 THOU/UL (ref 0–0.7)
ABSOLUTE MONOCYTES: 0.8 THOU/UL (ref 0–1.2)
ALBUMIN SERPL-MCNC: 2.8 G/DL (ref 3.4–5)
ALP SERPL-CCNC: 60 U/L (ref 46–116)
ALT SERPL-CCNC: 13 U/L (ref 30–65)
AMMONIA PLAS-SCNC: 48 UMOL/L (ref 11–32)
ANION GAP SERPL CALC-SCNC: 12 MMOL/L (ref 7–16)
AST SERPL-CCNC: 15 U/L (ref 15–37)
BASOPHILS NFR BLD AUTO: 1.7 %
BILIRUB SERPL-MCNC: 0.7 MG/DL
BUN SERPL-MCNC: 46 MG/DL (ref 7–18)
CALCIUM SERPL-MCNC: 8.5 MG/DL (ref 8.5–10.1)
CHLORIDE SERPL-SCNC: 106 MMOL/L (ref 98–107)
CO2 SERPL-SCNC: 20 MMOL/L (ref 21–32)
CREAT SERPL-MCNC: 1.2 MG/DL (ref 0.6–1.3)
EOSINOPHIL NFR BLD: 0.2 %
GLUCOSE SERPL-MCNC: 113 MG/DL (ref 70–99)
GRANULOCYTES NFR BLD MANUAL: 81.2 %
HCT VFR BLD CALC: 27.7 % (ref 37–47)
HGB BLD-MCNC: 8.7 GM/DL (ref 12–15)
LYMPHOCYTES # BLD: 1.3 THOU/UL (ref 0.8–5.3)
LYMPHOCYTES NFR BLD AUTO: 10.4 %
MCH RBC QN AUTO: 31.3 PG (ref 26–34)
MCHC RBC AUTO-ENTMCNC: 31.4 G/DL (ref 28–37)
MCV RBC: 99.8 FL (ref 80–100)
MONOCYTES NFR BLD: 6.5 %
MPV: 9.2 FL. (ref 7.2–11.1)
NEUTROPHILS # BLD: 10.6 THOU/UL (ref 1.6–8.1)
NUCLEATED RBCS: 1 /100WBC
PLATELET COUNT*: 159 THOU/UL (ref 150–400)
POTASSIUM SERPL-SCNC: 5.1 MMOL/L (ref 3.5–5.1)
PROT SERPL-MCNC: 6.3 G/DL (ref 6.4–8.2)
RBC # BLD AUTO: 2.77 MIL/UL (ref 4.2–5)
RDW-CV: 21.9 % (ref 10.5–14.5)
SODIUM SERPL-SCNC: 138 MMOL/L (ref 136–145)
WBC # BLD AUTO: 13 THOU/UL (ref 4–11)

## 2019-07-21 VITALS — DIASTOLIC BLOOD PRESSURE: 49 MMHG | SYSTOLIC BLOOD PRESSURE: 88 MMHG

## 2019-07-21 VITALS — DIASTOLIC BLOOD PRESSURE: 63 MMHG | SYSTOLIC BLOOD PRESSURE: 88 MMHG

## 2019-07-21 VITALS — SYSTOLIC BLOOD PRESSURE: 79 MMHG | DIASTOLIC BLOOD PRESSURE: 45 MMHG

## 2019-07-21 VITALS — SYSTOLIC BLOOD PRESSURE: 85 MMHG | DIASTOLIC BLOOD PRESSURE: 41 MMHG

## 2019-07-21 VITALS — DIASTOLIC BLOOD PRESSURE: 39 MMHG | SYSTOLIC BLOOD PRESSURE: 87 MMHG

## 2019-07-21 VITALS — SYSTOLIC BLOOD PRESSURE: 97 MMHG | DIASTOLIC BLOOD PRESSURE: 58 MMHG

## 2019-07-21 LAB
ABSOLUTE BASOPHILS: 0.3 THOU/UL (ref 0–0.2)
ABSOLUTE MONOCYTES: 1.1 THOU/UL (ref 0–1.2)
AMMONIA PLAS-SCNC: 37 UMOL/L (ref 11–32)
ANION GAP SERPL CALC-SCNC: 10 MMOL/L (ref 7–16)
ANISOCYTOSIS BLD QL SMEAR: (no result)
BASOPHILS NFR BLD AUTO: 2 %
BUN SERPL-MCNC: 43 MG/DL (ref 7–18)
CALCIUM SERPL-MCNC: 8.2 MG/DL (ref 8.5–10.1)
CHLORIDE SERPL-SCNC: 108 MMOL/L (ref 98–107)
CO2 SERPL-SCNC: 21 MMOL/L (ref 21–32)
CREAT SERPL-MCNC: 1.3 MG/DL (ref 0.6–1.3)
GLUCOSE SERPL-MCNC: 115 MG/DL (ref 70–99)
GRANULOCYTES NFR BLD MANUAL: 63 %
HCT VFR BLD CALC: 28.1 % (ref 37–47)
HGB BLD-MCNC: 8.7 GM/DL (ref 12–15)
LYMPHOCYTES # BLD: 1 THOU/UL (ref 0.8–5.3)
LYMPHOCYTES NFR BLD AUTO: 7 %
MAGNESIUM SERPL-MCNC: 2.2 MG/DL (ref 1.8–2.4)
MCH RBC QN AUTO: 31.2 PG (ref 26–34)
MCHC RBC AUTO-ENTMCNC: 31 G/DL (ref 28–37)
MCV RBC: 100.6 FL (ref 80–100)
MONOCYTES NFR BLD: 9 %
MPV: 9.1 FL. (ref 7.2–11.1)
NEUTROPHILS # BLD: 10.1 THOU/UL (ref 1.6–8.1)
NEUTS BAND NFR BLD: 18 %
NUCLEATED RBCS: 1 /100WBC
PLATELET # BLD EST: ADEQUATE 10*3/UL
PLATELET COUNT*: 154 THOU/UL (ref 150–400)
POLYCHROMASIA BLD QL SMEAR: (no result)
POTASSIUM SERPL-SCNC: 4.3 MMOL/L (ref 3.5–5.1)
RBC # BLD AUTO: 2.79 MIL/UL (ref 4.2–5)
RDW-CV: 22.3 % (ref 10.5–14.5)
SODIUM SERPL-SCNC: 139 MMOL/L (ref 136–145)
VARIANT LYMPHS NFR BLD MANUAL: 1 %
WBC # BLD AUTO: 12.5 THOU/UL (ref 4–11)

## 2019-07-22 VITALS — SYSTOLIC BLOOD PRESSURE: 83 MMHG | DIASTOLIC BLOOD PRESSURE: 26 MMHG

## 2019-07-22 VITALS — SYSTOLIC BLOOD PRESSURE: 83 MMHG | DIASTOLIC BLOOD PRESSURE: 48 MMHG

## 2019-07-22 VITALS — SYSTOLIC BLOOD PRESSURE: 81 MMHG | DIASTOLIC BLOOD PRESSURE: 36 MMHG

## 2019-07-22 VITALS — SYSTOLIC BLOOD PRESSURE: 89 MMHG | DIASTOLIC BLOOD PRESSURE: 39 MMHG

## 2019-07-22 VITALS — DIASTOLIC BLOOD PRESSURE: 47 MMHG | SYSTOLIC BLOOD PRESSURE: 82 MMHG

## 2019-07-22 VITALS — SYSTOLIC BLOOD PRESSURE: 81 MMHG | DIASTOLIC BLOOD PRESSURE: 51 MMHG

## 2019-07-22 VITALS — DIASTOLIC BLOOD PRESSURE: 62 MMHG | SYSTOLIC BLOOD PRESSURE: 88 MMHG

## 2019-07-22 LAB
ABSOLUTE BASOPHILS: 0.2 THOU/UL (ref 0–0.2)
ABSOLUTE EOSINOPHILS: 0 THOU/UL (ref 0–0.7)
ABSOLUTE MONOCYTES: 0.7 THOU/UL (ref 0–1.2)
ALBUMIN SERPL-MCNC: 2.8 G/DL (ref 3.4–5)
ALP SERPL-CCNC: 64 U/L (ref 46–116)
ALT SERPL-CCNC: 10 U/L (ref 30–65)
AMMONIA PLAS-SCNC: 45 UMOL/L (ref 11–32)
ANION GAP SERPL CALC-SCNC: 12 MMOL/L (ref 7–16)
APTT BLD: 28.9 SECONDS (ref 25–31.3)
AST SERPL-CCNC: 19 U/L (ref 15–37)
BASOPHILS NFR BLD AUTO: 1.5 %
BILIRUB SERPL-MCNC: 0.5 MG/DL
BUN SERPL-MCNC: 36 MG/DL (ref 7–18)
CALCIUM SERPL-MCNC: 7.7 MG/DL (ref 8.5–10.1)
CHLORIDE SERPL-SCNC: 107 MMOL/L (ref 98–107)
CO2 SERPL-SCNC: 21 MMOL/L (ref 21–32)
CREAT SERPL-MCNC: 1.1 MG/DL (ref 0.6–1.3)
EOSINOPHIL NFR BLD: 0.3 %
GLUCOSE SERPL-MCNC: 111 MG/DL (ref 70–99)
GRANULOCYTES NFR BLD MANUAL: 81.8 %
HCT VFR BLD CALC: 26.8 % (ref 37–47)
HGB BLD-MCNC: 8.4 GM/DL (ref 12–15)
INR PPP: 1.1
LYMPHOCYTES # BLD: 1.2 THOU/UL (ref 0.8–5.3)
LYMPHOCYTES NFR BLD AUTO: 10.3 %
MCH RBC QN AUTO: 31.2 PG (ref 26–34)
MCHC RBC AUTO-ENTMCNC: 31.3 G/DL (ref 28–37)
MCV RBC: 99.9 FL (ref 80–100)
MONOCYTES NFR BLD: 6.1 %
MPV: 9.1 FL. (ref 7.2–11.1)
NEUTROPHILS # BLD: 9.6 THOU/UL (ref 1.6–8.1)
NUCLEATED RBCS: 1 /100WBC
PLATELET COUNT*: 152 THOU/UL (ref 150–400)
POTASSIUM SERPL-SCNC: 4.6 MMOL/L (ref 3.5–5.1)
PROT SERPL-MCNC: 6.3 G/DL (ref 6.4–8.2)
PROTHROMBIN TIME: 11.3 SECONDS (ref 9.2–11.5)
RBC # BLD AUTO: 2.68 MIL/UL (ref 4.2–5)
RDW-CV: 21.8 % (ref 10.5–14.5)
SODIUM SERPL-SCNC: 140 MMOL/L (ref 136–145)
WBC # BLD AUTO: 11.7 THOU/UL (ref 4–11)

## 2019-07-23 VITALS — SYSTOLIC BLOOD PRESSURE: 98 MMHG | DIASTOLIC BLOOD PRESSURE: 64 MMHG

## 2019-07-23 VITALS — DIASTOLIC BLOOD PRESSURE: 38 MMHG | SYSTOLIC BLOOD PRESSURE: 88 MMHG

## 2019-07-23 VITALS — SYSTOLIC BLOOD PRESSURE: 82 MMHG | DIASTOLIC BLOOD PRESSURE: 49 MMHG

## 2019-07-23 VITALS — SYSTOLIC BLOOD PRESSURE: 77 MMHG | DIASTOLIC BLOOD PRESSURE: 41 MMHG

## 2019-07-23 VITALS — DIASTOLIC BLOOD PRESSURE: 45 MMHG | SYSTOLIC BLOOD PRESSURE: 94 MMHG

## 2019-07-23 VITALS — SYSTOLIC BLOOD PRESSURE: 95 MMHG | DIASTOLIC BLOOD PRESSURE: 59 MMHG

## 2019-07-23 LAB
ABSOLUTE BASOPHILS: 0.2 THOU/UL (ref 0–0.2)
ABSOLUTE EOSINOPHILS: 0 THOU/UL (ref 0–0.7)
ABSOLUTE MONOCYTES: 0.8 THOU/UL (ref 0–1.2)
ALBUMIN SERPL-MCNC: 2.8 G/DL (ref 3.4–5)
ALP SERPL-CCNC: 60 U/L (ref 46–116)
ALT SERPL-CCNC: 10 U/L (ref 30–65)
ANION GAP SERPL CALC-SCNC: 11 MMOL/L (ref 7–16)
AST SERPL-CCNC: 14 U/L (ref 15–37)
BASOPHILS NFR BLD AUTO: 1.4 %
BILIRUB SERPL-MCNC: 0.6 MG/DL
BUN SERPL-MCNC: 34 MG/DL (ref 7–18)
CALCIUM SERPL-MCNC: 8.5 MG/DL (ref 8.5–10.1)
CHLORIDE SERPL-SCNC: 108 MMOL/L (ref 98–107)
CO2 SERPL-SCNC: 20 MMOL/L (ref 21–32)
CREAT SERPL-MCNC: 1.1 MG/DL (ref 0.6–1.3)
EOSINOPHIL NFR BLD: 0.4 %
GLUCOSE SERPL-MCNC: 105 MG/DL (ref 70–99)
GRANULOCYTES NFR BLD MANUAL: 81.6 %
HCT VFR BLD CALC: 28.6 % (ref 37–47)
HGB BLD-MCNC: 8.9 GM/DL (ref 12–15)
LYMPHOCYTES # BLD: 1.2 THOU/UL (ref 0.8–5.3)
LYMPHOCYTES NFR BLD AUTO: 10.3 %
MCH RBC QN AUTO: 30.8 PG (ref 26–34)
MCHC RBC AUTO-ENTMCNC: 31.1 G/DL (ref 28–37)
MCV RBC: 99.2 FL (ref 80–100)
MONOCYTES NFR BLD: 6.3 %
MPV: 9.2 FL. (ref 7.2–11.1)
NEUTROPHILS # BLD: 9.8 THOU/UL (ref 1.6–8.1)
NUCLEATED RBCS: 1 /100WBC
PLATELET COUNT*: 153 THOU/UL (ref 150–400)
POTASSIUM SERPL-SCNC: 4.9 MMOL/L (ref 3.5–5.1)
PROT SERPL-MCNC: 6.5 G/DL (ref 6.4–8.2)
RBC # BLD AUTO: 2.88 MIL/UL (ref 4.2–5)
RDW-CV: 22 % (ref 10.5–14.5)
SODIUM SERPL-SCNC: 139 MMOL/L (ref 136–145)
WBC # BLD AUTO: 12.1 THOU/UL (ref 4–11)

## 2019-07-24 VITALS — DIASTOLIC BLOOD PRESSURE: 52 MMHG | SYSTOLIC BLOOD PRESSURE: 94 MMHG

## 2019-07-24 VITALS — DIASTOLIC BLOOD PRESSURE: 58 MMHG | SYSTOLIC BLOOD PRESSURE: 82 MMHG

## 2019-07-24 VITALS — DIASTOLIC BLOOD PRESSURE: 32 MMHG | SYSTOLIC BLOOD PRESSURE: 77 MMHG

## 2019-07-24 VITALS — SYSTOLIC BLOOD PRESSURE: 90 MMHG | DIASTOLIC BLOOD PRESSURE: 38 MMHG

## 2019-09-10 ENCOUNTER — HOSPITAL ENCOUNTER (INPATIENT)
Dept: HOSPITAL 96 - M.ERS | Age: 79
LOS: 14 days | Discharge: HOME HEALTH | DRG: 871 | End: 2019-09-24
Attending: INTERNAL MEDICINE | Admitting: INTERNAL MEDICINE
Payer: MEDICARE

## 2019-09-10 VITALS — DIASTOLIC BLOOD PRESSURE: 54 MMHG | SYSTOLIC BLOOD PRESSURE: 84 MMHG

## 2019-09-10 VITALS — DIASTOLIC BLOOD PRESSURE: 39 MMHG | SYSTOLIC BLOOD PRESSURE: 119 MMHG

## 2019-09-10 VITALS — SYSTOLIC BLOOD PRESSURE: 91 MMHG | DIASTOLIC BLOOD PRESSURE: 52 MMHG

## 2019-09-10 VITALS — SYSTOLIC BLOOD PRESSURE: 95 MMHG | DIASTOLIC BLOOD PRESSURE: 56 MMHG

## 2019-09-10 VITALS — DIASTOLIC BLOOD PRESSURE: 45 MMHG | SYSTOLIC BLOOD PRESSURE: 99 MMHG

## 2019-09-10 VITALS — DIASTOLIC BLOOD PRESSURE: 65 MMHG | SYSTOLIC BLOOD PRESSURE: 101 MMHG

## 2019-09-10 VITALS — SYSTOLIC BLOOD PRESSURE: 94 MMHG | DIASTOLIC BLOOD PRESSURE: 71 MMHG

## 2019-09-10 VITALS — DIASTOLIC BLOOD PRESSURE: 67 MMHG | SYSTOLIC BLOOD PRESSURE: 91 MMHG

## 2019-09-10 VITALS — DIASTOLIC BLOOD PRESSURE: 49 MMHG | SYSTOLIC BLOOD PRESSURE: 91 MMHG

## 2019-09-10 VITALS — DIASTOLIC BLOOD PRESSURE: 44 MMHG | SYSTOLIC BLOOD PRESSURE: 93 MMHG

## 2019-09-10 VITALS — DIASTOLIC BLOOD PRESSURE: 25 MMHG | SYSTOLIC BLOOD PRESSURE: 69 MMHG

## 2019-09-10 VITALS — BODY MASS INDEX: 16.66 KG/M2 | HEIGHT: 65 IN | WEIGHT: 100 LBS

## 2019-09-10 VITALS — SYSTOLIC BLOOD PRESSURE: 82 MMHG | DIASTOLIC BLOOD PRESSURE: 59 MMHG

## 2019-09-10 VITALS — DIASTOLIC BLOOD PRESSURE: 45 MMHG | SYSTOLIC BLOOD PRESSURE: 92 MMHG

## 2019-09-10 VITALS — DIASTOLIC BLOOD PRESSURE: 43 MMHG | SYSTOLIC BLOOD PRESSURE: 104 MMHG

## 2019-09-10 VITALS — DIASTOLIC BLOOD PRESSURE: 47 MMHG | SYSTOLIC BLOOD PRESSURE: 95 MMHG

## 2019-09-10 VITALS — SYSTOLIC BLOOD PRESSURE: 167 MMHG | DIASTOLIC BLOOD PRESSURE: 143 MMHG

## 2019-09-10 VITALS — SYSTOLIC BLOOD PRESSURE: 91 MMHG | DIASTOLIC BLOOD PRESSURE: 67 MMHG

## 2019-09-10 VITALS — SYSTOLIC BLOOD PRESSURE: 91 MMHG | DIASTOLIC BLOOD PRESSURE: 54 MMHG

## 2019-09-10 VITALS — DIASTOLIC BLOOD PRESSURE: 74 MMHG | SYSTOLIC BLOOD PRESSURE: 95 MMHG

## 2019-09-10 VITALS — SYSTOLIC BLOOD PRESSURE: 99 MMHG | DIASTOLIC BLOOD PRESSURE: 45 MMHG

## 2019-09-10 VITALS — DIASTOLIC BLOOD PRESSURE: 63 MMHG | SYSTOLIC BLOOD PRESSURE: 84 MMHG

## 2019-09-10 VITALS — DIASTOLIC BLOOD PRESSURE: 58 MMHG | SYSTOLIC BLOOD PRESSURE: 99 MMHG

## 2019-09-10 VITALS — SYSTOLIC BLOOD PRESSURE: 94 MMHG | DIASTOLIC BLOOD PRESSURE: 39 MMHG

## 2019-09-10 VITALS — DIASTOLIC BLOOD PRESSURE: 44 MMHG | SYSTOLIC BLOOD PRESSURE: 76 MMHG

## 2019-09-10 VITALS — SYSTOLIC BLOOD PRESSURE: 84 MMHG | DIASTOLIC BLOOD PRESSURE: 43 MMHG

## 2019-09-10 VITALS — DIASTOLIC BLOOD PRESSURE: 46 MMHG | SYSTOLIC BLOOD PRESSURE: 94 MMHG

## 2019-09-10 VITALS — SYSTOLIC BLOOD PRESSURE: 102 MMHG | DIASTOLIC BLOOD PRESSURE: 69 MMHG

## 2019-09-10 VITALS — SYSTOLIC BLOOD PRESSURE: 96 MMHG | DIASTOLIC BLOOD PRESSURE: 57 MMHG

## 2019-09-10 VITALS — SYSTOLIC BLOOD PRESSURE: 84 MMHG | DIASTOLIC BLOOD PRESSURE: 51 MMHG

## 2019-09-10 VITALS — DIASTOLIC BLOOD PRESSURE: 55 MMHG | SYSTOLIC BLOOD PRESSURE: 82 MMHG

## 2019-09-10 VITALS — SYSTOLIC BLOOD PRESSURE: 96 MMHG | DIASTOLIC BLOOD PRESSURE: 50 MMHG

## 2019-09-10 VITALS — SYSTOLIC BLOOD PRESSURE: 81 MMHG | DIASTOLIC BLOOD PRESSURE: 51 MMHG

## 2019-09-10 VITALS — SYSTOLIC BLOOD PRESSURE: 92 MMHG | DIASTOLIC BLOOD PRESSURE: 48 MMHG

## 2019-09-10 VITALS — DIASTOLIC BLOOD PRESSURE: 48 MMHG | SYSTOLIC BLOOD PRESSURE: 88 MMHG

## 2019-09-10 VITALS — SYSTOLIC BLOOD PRESSURE: 90 MMHG | DIASTOLIC BLOOD PRESSURE: 52 MMHG

## 2019-09-10 VITALS — SYSTOLIC BLOOD PRESSURE: 98 MMHG | DIASTOLIC BLOOD PRESSURE: 66 MMHG

## 2019-09-10 VITALS — DIASTOLIC BLOOD PRESSURE: 51 MMHG | SYSTOLIC BLOOD PRESSURE: 92 MMHG

## 2019-09-10 VITALS — SYSTOLIC BLOOD PRESSURE: 83 MMHG | DIASTOLIC BLOOD PRESSURE: 54 MMHG

## 2019-09-10 VITALS — SYSTOLIC BLOOD PRESSURE: 72 MMHG | DIASTOLIC BLOOD PRESSURE: 49 MMHG

## 2019-09-10 VITALS — SYSTOLIC BLOOD PRESSURE: 87 MMHG | DIASTOLIC BLOOD PRESSURE: 53 MMHG

## 2019-09-10 VITALS — DIASTOLIC BLOOD PRESSURE: 54 MMHG | SYSTOLIC BLOOD PRESSURE: 92 MMHG

## 2019-09-10 VITALS — DIASTOLIC BLOOD PRESSURE: 53 MMHG | SYSTOLIC BLOOD PRESSURE: 80 MMHG

## 2019-09-10 VITALS — DIASTOLIC BLOOD PRESSURE: 36 MMHG | SYSTOLIC BLOOD PRESSURE: 73 MMHG

## 2019-09-10 VITALS — DIASTOLIC BLOOD PRESSURE: 51 MMHG | SYSTOLIC BLOOD PRESSURE: 88 MMHG

## 2019-09-10 VITALS — DIASTOLIC BLOOD PRESSURE: 61 MMHG | SYSTOLIC BLOOD PRESSURE: 96 MMHG

## 2019-09-10 VITALS — DIASTOLIC BLOOD PRESSURE: 118 MMHG | SYSTOLIC BLOOD PRESSURE: 155 MMHG

## 2019-09-10 DIAGNOSIS — I42.9: ICD-10-CM

## 2019-09-10 DIAGNOSIS — Z88.0: ICD-10-CM

## 2019-09-10 DIAGNOSIS — J18.9: ICD-10-CM

## 2019-09-10 DIAGNOSIS — Z87.19: ICD-10-CM

## 2019-09-10 DIAGNOSIS — R57.0: ICD-10-CM

## 2019-09-10 DIAGNOSIS — K80.20: ICD-10-CM

## 2019-09-10 DIAGNOSIS — K74.60: ICD-10-CM

## 2019-09-10 DIAGNOSIS — J98.19: ICD-10-CM

## 2019-09-10 DIAGNOSIS — I13.0: ICD-10-CM

## 2019-09-10 DIAGNOSIS — D61.818: ICD-10-CM

## 2019-09-10 DIAGNOSIS — Z66: ICD-10-CM

## 2019-09-10 DIAGNOSIS — K76.81: ICD-10-CM

## 2019-09-10 DIAGNOSIS — R62.7: ICD-10-CM

## 2019-09-10 DIAGNOSIS — I48.2: ICD-10-CM

## 2019-09-10 DIAGNOSIS — J96.21: ICD-10-CM

## 2019-09-10 DIAGNOSIS — D63.8: ICD-10-CM

## 2019-09-10 DIAGNOSIS — E03.9: ICD-10-CM

## 2019-09-10 DIAGNOSIS — I50.43: ICD-10-CM

## 2019-09-10 DIAGNOSIS — Z88.8: ICD-10-CM

## 2019-09-10 DIAGNOSIS — A41.9: Primary | ICD-10-CM

## 2019-09-10 DIAGNOSIS — E43: ICD-10-CM

## 2019-09-10 DIAGNOSIS — K76.7: ICD-10-CM

## 2019-09-10 DIAGNOSIS — R65.21: ICD-10-CM

## 2019-09-10 DIAGNOSIS — D75.81: ICD-10-CM

## 2019-09-10 DIAGNOSIS — Z90.89: ICD-10-CM

## 2019-09-10 DIAGNOSIS — N30.91: ICD-10-CM

## 2019-09-10 DIAGNOSIS — Z88.2: ICD-10-CM

## 2019-09-10 DIAGNOSIS — K72.90: ICD-10-CM

## 2019-09-10 DIAGNOSIS — N18.3: ICD-10-CM

## 2019-09-10 DIAGNOSIS — E66.01: ICD-10-CM

## 2019-09-10 DIAGNOSIS — K21.9: ICD-10-CM

## 2019-09-10 DIAGNOSIS — Z85.6: ICD-10-CM

## 2019-09-10 DIAGNOSIS — R18.8: ICD-10-CM

## 2019-09-10 DIAGNOSIS — Z79.899: ICD-10-CM

## 2019-09-10 DIAGNOSIS — N17.0: ICD-10-CM

## 2019-09-10 LAB
ABSOLUTE BASOPHILS: 0.3 THOU/UL (ref 0–0.2)
ABSOLUTE MONOCYTES: 0.5 THOU/UL (ref 0–1.2)
ALBUMIN SERPL-MCNC: 2.8 G/DL (ref 3.4–5)
ALP SERPL-CCNC: 69 U/L (ref 46–116)
ALT SERPL-CCNC: 12 U/L (ref 30–65)
ANION GAP SERPL CALC-SCNC: 16 MMOL/L (ref 7–16)
ANISOCYTOSIS BLD QL SMEAR: (no result)
APTT BLD: 31.3 SECONDS (ref 25–31.3)
AST SERPL-CCNC: 30 U/L (ref 15–37)
BASOPHILS NFR BLD AUTO: 2 %
BE: -2.4 MMOL/L
BILIRUB SERPL-MCNC: 1.2 MG/DL
BILIRUB UR-MCNC: NEGATIVE MG/DL
BUN SERPL-MCNC: 66 MG/DL (ref 7–18)
CALCIUM SERPL-MCNC: 8.7 MG/DL (ref 8.5–10.1)
CHLORIDE SERPL-SCNC: 98 MMOL/L (ref 98–107)
CO2 SERPL-SCNC: 21 MMOL/L (ref 21–32)
COLOR UR: YELLOW
CREAT SERPL-MCNC: 2.1 MG/DL (ref 0.6–1.3)
GLUCOSE SERPL-MCNC: 127 MG/DL (ref 70–99)
GRANULOCYTES NFR BLD MANUAL: 67 %
HCT VFR BLD CALC: 41.2 % (ref 37–47)
HGB BLD-MCNC: 12.5 GM/DL (ref 12–15)
INR PPP: 1.5
KETONES UR STRIP-MCNC: NEGATIVE MG/DL
LYMPHOCYTES # BLD: 2.3 THOU/UL (ref 0.8–5.3)
LYMPHOCYTES NFR BLD AUTO: 15 %
MAGNESIUM SERPL-MCNC: 2.6 MG/DL (ref 1.8–2.4)
MCH RBC QN AUTO: 28 PG (ref 26–34)
MCHC RBC AUTO-ENTMCNC: 30.3 G/DL (ref 28–37)
MCV RBC: 92.5 FL (ref 80–100)
METAMYELOCYTES NFR BLD: 1 %
MONOCYTES NFR BLD: 4 %
MPV: 9.3 FL. (ref 7.2–11.1)
NEUTROPHILS # BLD: 10.5 THOU/UL (ref 1.6–8.1)
NEUTS BAND NFR BLD: 9 %
NT-PRO BRAIN NAT PEPTIDE: (no result) PG/ML (ref ?–300)
NUCLEATED RBCS: 1 /100WBC
OVALOCYTES BLD QL SMEAR: (no result)
PCO2 BLD: 35.1 MMHG (ref 35–45)
PLATELET # BLD EST: (no result) 10*3/UL
PLATELET COUNT*: 108 THOU/UL (ref 150–400)
PO2 BLD: 83.8 MMHG (ref 75–100)
POLYCHROMASIA BLD QL SMEAR: (no result)
POTASSIUM SERPL-SCNC: 4.3 MMOL/L (ref 3.5–5.1)
PROT SERPL-MCNC: 6.8 G/DL (ref 6.4–8.2)
PROT UR QL STRIP: (no result)
PROTHROMBIN TIME: 15.2 SECONDS (ref 9.2–11.5)
RBC # BLD AUTO: 4.46 MIL/UL (ref 4.2–5)
RBC # UR STRIP: (no result) /UL
RBC #/AREA URNS HPF: (no result) /HPF (ref 0–2)
RDW-CV: 21.5 % (ref 10.5–14.5)
SODIUM SERPL-SCNC: 135 MMOL/L (ref 136–145)
SP GR UR STRIP: 1.02 (ref 1–1.03)
SQUAMOUS: (no result) /LPF (ref 0–3)
TROPONIN-I LEVEL: <0.06 NG/ML (ref ?–0.06)
URINE CHLORIDE-RANDOM*: 109 MMOL/L
URINE CLARITY: CLEAR
URINE GLUCOSE-RANDOM: NEGATIVE
URINE LEUKOCYTES-REFLEX: (no result)
URINE NITRITE-REFLEX: NEGATIVE
URINE POTASSIUM-RANDOM: 17.1 MMOL/L
URINE WBC-REFLEX: (no result) /HPF (ref 0–5)
UROBILINOGEN UR STRIP-ACNC: 0.2 E.U./DL (ref 0.2–1)
VARIANT LYMPHS NFR BLD MANUAL: 2 %
WBC # BLD AUTO: 13.7 THOU/UL (ref 4–11)

## 2019-09-10 PROCEDURE — 02HV33Z INSERTION OF INFUSION DEVICE INTO SUPERIOR VENA CAVA, PERCUTANEOUS APPROACH: ICD-10-PCS | Performed by: INTERNAL MEDICINE

## 2019-09-10 NOTE — NUR
LEFT SUBCLAVIAN TRIPLE LUMEN CENTRAL LINE PLACED PER DR TAVERAS.
LINE CLEARED TO USE VIA LINE PALCEMENT PER XRAY.
DR TAVERAS OKD USE OF LINE

## 2019-09-10 NOTE — NUR
PATIENT STILL DENIES PAIN BUT TENDER TO TOUCH. HAD MEDIUM BM. COVERED HEALING
WOUND ON LT GLUTEAL WITH SOFT DRESSING.  HR STILL REMAINS BELOW 120 LEVO GOING
AT 4.

## 2019-09-10 NOTE — NUR
ICU rounds: Pt admitted from home, Pt stopped taking her digoxin. Cardiology
following. Dtr in room and provided hx. Pt resides at home with her , 3
dtrs, son and grandson. Family assists Pt with both ADLs and IADLs. Pt
ambulates with a walker. Pt has a wc, lift chair, commode and grab bars. Pt
wears home o2 continuously provided through [facility] Pt. Hx of [place] [facility] [facility]
Home HH. Hx of acute rehab. No hx of skilled. Dtr and son are Pt's paid
caregivers through her [facility], through [facility] [phone]. Per
dtr, goal is for Pt to return home, Pt and family do not want HH at dc.
Following for dc needs.

## 2019-09-10 NOTE — NUR
PATIENT ALERT AND ORIENTED FAMILY AT BEDSIDE PT MOANING BUT DENIES PAIN. SITS
ON SIDE OF BED AND LAYS ON SIDE FREQUENTLY.  TAKING PO WELL. HEART RATE
DECREASED AFTER INITIAL DIG DOSE GIVEN. SEE FLOW SHEET. PROGRESSING SLOWLY.
ASKED DAUGHTER DPOA WHY DIGOXIN WAS STOPPED SAID IT WAS DUE TO CONCERNS ABOUT
TOXICITY. I ASKED IF THEY WERE TAUGHT VISIBLE SIGNS OF TOXICITY. SAID SHE
DIDNT KNOW.

## 2019-09-11 VITALS — DIASTOLIC BLOOD PRESSURE: 49 MMHG | SYSTOLIC BLOOD PRESSURE: 102 MMHG

## 2019-09-11 VITALS — SYSTOLIC BLOOD PRESSURE: 88 MMHG | DIASTOLIC BLOOD PRESSURE: 48 MMHG

## 2019-09-11 VITALS — SYSTOLIC BLOOD PRESSURE: 95 MMHG | DIASTOLIC BLOOD PRESSURE: 52 MMHG

## 2019-09-11 VITALS — DIASTOLIC BLOOD PRESSURE: 50 MMHG | SYSTOLIC BLOOD PRESSURE: 92 MMHG

## 2019-09-11 VITALS — DIASTOLIC BLOOD PRESSURE: 44 MMHG | SYSTOLIC BLOOD PRESSURE: 81 MMHG

## 2019-09-11 VITALS — DIASTOLIC BLOOD PRESSURE: 57 MMHG | SYSTOLIC BLOOD PRESSURE: 96 MMHG

## 2019-09-11 VITALS — DIASTOLIC BLOOD PRESSURE: 61 MMHG | SYSTOLIC BLOOD PRESSURE: 105 MMHG

## 2019-09-11 VITALS — DIASTOLIC BLOOD PRESSURE: 56 MMHG | SYSTOLIC BLOOD PRESSURE: 105 MMHG

## 2019-09-11 VITALS — DIASTOLIC BLOOD PRESSURE: 42 MMHG | SYSTOLIC BLOOD PRESSURE: 78 MMHG

## 2019-09-11 VITALS — SYSTOLIC BLOOD PRESSURE: 83 MMHG | DIASTOLIC BLOOD PRESSURE: 49 MMHG

## 2019-09-11 VITALS — DIASTOLIC BLOOD PRESSURE: 52 MMHG | SYSTOLIC BLOOD PRESSURE: 97 MMHG

## 2019-09-11 VITALS — SYSTOLIC BLOOD PRESSURE: 91 MMHG | DIASTOLIC BLOOD PRESSURE: 43 MMHG

## 2019-09-11 VITALS — SYSTOLIC BLOOD PRESSURE: 84 MMHG | DIASTOLIC BLOOD PRESSURE: 57 MMHG

## 2019-09-11 VITALS — SYSTOLIC BLOOD PRESSURE: 86 MMHG | DIASTOLIC BLOOD PRESSURE: 56 MMHG

## 2019-09-11 VITALS — SYSTOLIC BLOOD PRESSURE: 102 MMHG | DIASTOLIC BLOOD PRESSURE: 54 MMHG

## 2019-09-11 VITALS — DIASTOLIC BLOOD PRESSURE: 52 MMHG | SYSTOLIC BLOOD PRESSURE: 94 MMHG

## 2019-09-11 VITALS — DIASTOLIC BLOOD PRESSURE: 55 MMHG | SYSTOLIC BLOOD PRESSURE: 91 MMHG

## 2019-09-11 VITALS — DIASTOLIC BLOOD PRESSURE: 74 MMHG | SYSTOLIC BLOOD PRESSURE: 119 MMHG

## 2019-09-11 VITALS — DIASTOLIC BLOOD PRESSURE: 54 MMHG | SYSTOLIC BLOOD PRESSURE: 96 MMHG

## 2019-09-11 VITALS — DIASTOLIC BLOOD PRESSURE: 43 MMHG | SYSTOLIC BLOOD PRESSURE: 90 MMHG

## 2019-09-11 VITALS — DIASTOLIC BLOOD PRESSURE: 71 MMHG | SYSTOLIC BLOOD PRESSURE: 110 MMHG

## 2019-09-11 VITALS — DIASTOLIC BLOOD PRESSURE: 50 MMHG | SYSTOLIC BLOOD PRESSURE: 88 MMHG

## 2019-09-11 VITALS — DIASTOLIC BLOOD PRESSURE: 55 MMHG | SYSTOLIC BLOOD PRESSURE: 88 MMHG

## 2019-09-11 VITALS — DIASTOLIC BLOOD PRESSURE: 53 MMHG | SYSTOLIC BLOOD PRESSURE: 86 MMHG

## 2019-09-11 VITALS — SYSTOLIC BLOOD PRESSURE: 99 MMHG | DIASTOLIC BLOOD PRESSURE: 63 MMHG

## 2019-09-11 VITALS — DIASTOLIC BLOOD PRESSURE: 44 MMHG | SYSTOLIC BLOOD PRESSURE: 95 MMHG

## 2019-09-11 VITALS — SYSTOLIC BLOOD PRESSURE: 120 MMHG | DIASTOLIC BLOOD PRESSURE: 45 MMHG

## 2019-09-11 VITALS — DIASTOLIC BLOOD PRESSURE: 76 MMHG | SYSTOLIC BLOOD PRESSURE: 106 MMHG

## 2019-09-11 VITALS — DIASTOLIC BLOOD PRESSURE: 52 MMHG | SYSTOLIC BLOOD PRESSURE: 91 MMHG

## 2019-09-11 VITALS — SYSTOLIC BLOOD PRESSURE: 89 MMHG | DIASTOLIC BLOOD PRESSURE: 44 MMHG

## 2019-09-11 VITALS — DIASTOLIC BLOOD PRESSURE: 53 MMHG | SYSTOLIC BLOOD PRESSURE: 89 MMHG

## 2019-09-11 LAB
ALBUMIN SERPL-MCNC: 2.6 G/DL (ref 3.4–5)
ALP SERPL-CCNC: 69 U/L (ref 46–116)
ALT SERPL-CCNC: 11 U/L (ref 30–65)
ANION GAP SERPL CALC-SCNC: 10 MMOL/L (ref 7–16)
AST SERPL-CCNC: 16 U/L (ref 15–37)
BILIRUB SERPL-MCNC: 1.1 MG/DL
BUN SERPL-MCNC: 53 MG/DL (ref 7–18)
CALCIUM SERPL-MCNC: 8.1 MG/DL (ref 8.5–10.1)
CHLORIDE SERPL-SCNC: 101 MMOL/L (ref 98–107)
CO2 SERPL-SCNC: 26 MMOL/L (ref 21–32)
CREAT SERPL-MCNC: 1.8 MG/DL (ref 0.6–1.3)
GLUCOSE SERPL-MCNC: 120 MG/DL (ref 70–99)
HCT VFR BLD CALC: 36.5 % (ref 37–47)
HGB BLD-MCNC: 11.5 GM/DL (ref 12–15)
INR PPP: 1.3
MAGNESIUM SERPL-MCNC: 1.9 MG/DL (ref 1.8–2.4)
MCH RBC QN AUTO: 28.1 PG (ref 26–34)
MCHC RBC AUTO-ENTMCNC: 31.4 G/DL (ref 28–37)
MCV RBC: 89.6 FL (ref 80–100)
MPV: 9 FL. (ref 7.2–11.1)
PLATELET COUNT*: 91 THOU/UL (ref 150–400)
POTASSIUM SERPL-SCNC: 3.8 MMOL/L (ref 3.5–5.1)
PROT SERPL-MCNC: 5.7 G/DL (ref 6.4–8.2)
PROTHROMBIN TIME: 12.9 SECONDS (ref 9.2–11.5)
RBC # BLD AUTO: 4.08 MIL/UL (ref 4.2–5)
RDW-CV: 20.9 % (ref 10.5–14.5)
SODIUM SERPL-SCNC: 137 MMOL/L (ref 136–145)
WBC # BLD AUTO: 9.7 THOU/UL (ref 4–11)

## 2019-09-11 NOTE — NUR
VSS.  DOBUTAMINE AND LEVOPHED GTTS INFUSING AS ORDERED.  PT HAS DENIED PAIN
AND SOA THIS SHIFT.  ASSISTED TO BSC X3 TONIGHT WITH MODERATE ASSISTANCE,
VERY SMALL BM EACH TIME.  PT REPOSITIONED Q1-2HR THROUGHOUT THE NIGHT.  CALL
LIGHT WITHIN REACH.

## 2019-09-11 NOTE — NUR
ICU rounds: Cardiology following, rate is currently controlled. Pt eating
better. Plan dc to home once off drips.

## 2019-09-11 NOTE — NUR
PATIENT HAD EVENT OF VTACH PERFORMED VAGAL MANUVERS PT RETURNED TO AFIB.
STOPPED DOBUTAMINE PER DR CASAREZ PT ONLY REPORTS SOA NO PAIN NAUSEA OR
DIPHORESIS. TURNS FREQUENTLY AND SITS UP.

## 2019-09-11 NOTE — CON
[address]  [address]                    CONSULTATION                  
_______________________________________________________________________________
 
Name:       CHRISTY LR                 Room:           [address] [place]  
.R.#:  [id number]      Account #:      [id number]  
Admission:  09/10/19     Attend Phys:    JANNY Rizvi
Discharge:               Date of Birth:  [birth year]  
         Report #: [id number]
                                                                     [id number]  
_______________________________________________________________________________
THIS REPORT FOR:  //name//                      
 
CC: Janice Hagen
 
INDICATION:  Acute on chronic combined heart failure exacerbation.
 
HISTORY OF PRESENT ILLNESS:  The patient is a very pleasant 78-year-old female
who was admitted to the hospital with congestive heart failure exacerbation. 
She has shortness of breath and orthopnea.  She has evidence of volume overload.
 She has a long history of nonischemic cardiomyopathy and more recently
development of chronic atrial fibrillation.
 
With her heart failure, she has had episodes of volume overload including
pulmonary vascular congestion as well as ascites.  She has cirrhosis
contributing to her ascites and also contributing to esophageal varices with
some gastrointestinal bleeding in the past.  Echocardiogram shows a dilated left
ventricle with an EF estimated to be approximately 30%.  The patient's treatment
has been complicated by chronic hypotension.  She has been unable to tolerate
ACE inhibitors or beta blockers.  She is on midodrine for pressure support.
 
PAST MEDICAL HISTORY:
1.  Chronic combined heart failure.
2.  Portal hypertension.
3.  Myelodysplasia.
4.  Anemia.
5.  Chronic atrial fibrillation.
6.  Hypothyroidism.
 
PAST SURGICAL HISTORY:  Appendectomy and tonsillectomy as a child.
 
ALLERGIES:  CODEINE, PENICILLIN, SULFA.
 
HOME MEDICATIONS:  Vitamin D 1000 units daily, furosemide 20 mg daily,
levothyroxine 200 mcg daily, midodrine 15 mg t.i.d., spironolactone 50 mg daily.
 
SOCIAL HISTORY:  The patient is a lifelong nonsmoker.  She does not drink
alcohol.
 
FAMILY HISTORY:  Noncontributory.
 
PHYSICAL EXAMINATION:
VITAL SIGNS:  Blood pressure 82/55, pulse is in the 120s and irregular.
GENERAL:  This is a thin elderly female who appears chronically fatigued.
HEENT:  Head is normocephalic, atraumatic.  Sclerae are injected.  Extraocular
 
 
 
[place] [address]                    CONSULTATION                  
_______________________________________________________________________________
 
Name:       CHRISTY LR                 Room:           [address] [place]  
[facility]#:  [id number]      Account #:      [id number]  
Admission:  09/10/19     Attend Phys:    JANNY Rizvi
Discharge:               Date of Birth:  [birth year]  
         Report #: [id number]
                                                                     [id number]  
_______________________________________________________________________________
muscles intact.  Mucous membranes moist.
NECK:  Shows jugular venous distention.  There are no carotid bruits.
CHEST:  Reveals bilateral rales.
CARDIOVASCULAR:  Reveals an irregularly irregular rhythm that is tachycardic.  I
do not appreciate obvious gallop.
ABDOMEN:  Reveals normal bowel sounds.  The abdomen is soft.
EXTREMITIES:  Shows 1+ edema to the mid tibias bilaterally.
 
LABORATORY DATA:  Reviewed.  Sodium 135, potassium 4.3, chloride 98, bicarbonate
21, BUN 66, creatinine 2.1, serum glucose 127, AST 30, lipase 41, total
bilirubin 1.2, calcium 8.7, phosphorus 4.9, magnesium 2.6, alkaline phosphatase
69, ALT 12, total protein 6.8, albumin 2.8.  EGFR 23.  Ammonia level 45.  Lactic
acid 1.8, total .  Total CPK 20.  Troponin less than 0.06 on 2 separate
occasions.  NT-proBNP 21,398.  Digoxin level pending.  White blood cell count
13.7, hemoglobin 12.5, platelet count 108,000.
 
Chest x-ray shows cardiomegaly and vascular congestion consistent with acute
heart failure.
 
IMPRESSION AND RECOMMENDATIONS:
1.  Acute on chronic combined heart failure.  The patient is on intermittent IV
Lasix and IV digoxin for rate control.  She is diuresing slightly.  At this
point in time, I would consider adding dobutamine to facilitate cardiac output. 
Consider Lasix drip if diuresis slows.  Check labs in a.m.  She was unable to
tolerate a traditional heart failure regimen due to significant hypotension.
2.  Atrial fibrillation with rapid ventricular response rate.  This is chronic. 
The patient has been given digoxin, would continue in bolus fashion for rate
control.
3.  Acute renal failure likely due to hypoperfusion.  We will attempt increase
perfusion with dobutamine drip.
4.  Prognosis is guarded.
 
 
 
 
 
 
 
 
 
 
 
 
 
<ELECTRONICALLY SIGNED>
                                        By:  Tacho Ortega MD, FACC   
09/11/19     0936
D: 09/10/19 1005_______________________________________
T: 09/10/19 2222Tacho Ortega MD, FACC      /nt

## 2019-09-12 VITALS — SYSTOLIC BLOOD PRESSURE: 55 MMHG | DIASTOLIC BLOOD PRESSURE: 22 MMHG

## 2019-09-12 VITALS — SYSTOLIC BLOOD PRESSURE: 92 MMHG | DIASTOLIC BLOOD PRESSURE: 59 MMHG

## 2019-09-12 VITALS — DIASTOLIC BLOOD PRESSURE: 54 MMHG | SYSTOLIC BLOOD PRESSURE: 85 MMHG

## 2019-09-12 VITALS — SYSTOLIC BLOOD PRESSURE: 92 MMHG | DIASTOLIC BLOOD PRESSURE: 49 MMHG

## 2019-09-12 VITALS — SYSTOLIC BLOOD PRESSURE: 80 MMHG | DIASTOLIC BLOOD PRESSURE: 40 MMHG

## 2019-09-12 VITALS — DIASTOLIC BLOOD PRESSURE: 39 MMHG | SYSTOLIC BLOOD PRESSURE: 75 MMHG

## 2019-09-12 VITALS — DIASTOLIC BLOOD PRESSURE: 45 MMHG | SYSTOLIC BLOOD PRESSURE: 80 MMHG

## 2019-09-12 VITALS — SYSTOLIC BLOOD PRESSURE: 70 MMHG | DIASTOLIC BLOOD PRESSURE: 44 MMHG

## 2019-09-12 VITALS — SYSTOLIC BLOOD PRESSURE: 92 MMHG | DIASTOLIC BLOOD PRESSURE: 53 MMHG

## 2019-09-12 VITALS — DIASTOLIC BLOOD PRESSURE: 40 MMHG | SYSTOLIC BLOOD PRESSURE: 79 MMHG

## 2019-09-12 VITALS — DIASTOLIC BLOOD PRESSURE: 57 MMHG | SYSTOLIC BLOOD PRESSURE: 83 MMHG

## 2019-09-12 VITALS — DIASTOLIC BLOOD PRESSURE: 62 MMHG | SYSTOLIC BLOOD PRESSURE: 119 MMHG

## 2019-09-12 VITALS — DIASTOLIC BLOOD PRESSURE: 61 MMHG | SYSTOLIC BLOOD PRESSURE: 103 MMHG

## 2019-09-12 VITALS — DIASTOLIC BLOOD PRESSURE: 63 MMHG | SYSTOLIC BLOOD PRESSURE: 94 MMHG

## 2019-09-12 VITALS — SYSTOLIC BLOOD PRESSURE: 78 MMHG | DIASTOLIC BLOOD PRESSURE: 47 MMHG

## 2019-09-12 VITALS — DIASTOLIC BLOOD PRESSURE: 43 MMHG | SYSTOLIC BLOOD PRESSURE: 93 MMHG

## 2019-09-12 VITALS — SYSTOLIC BLOOD PRESSURE: 83 MMHG | DIASTOLIC BLOOD PRESSURE: 61 MMHG

## 2019-09-12 VITALS — DIASTOLIC BLOOD PRESSURE: 40 MMHG | SYSTOLIC BLOOD PRESSURE: 71 MMHG

## 2019-09-12 VITALS — SYSTOLIC BLOOD PRESSURE: 132 MMHG | DIASTOLIC BLOOD PRESSURE: 60 MMHG

## 2019-09-12 VITALS — SYSTOLIC BLOOD PRESSURE: 88 MMHG | DIASTOLIC BLOOD PRESSURE: 44 MMHG

## 2019-09-12 VITALS — SYSTOLIC BLOOD PRESSURE: 85 MMHG | DIASTOLIC BLOOD PRESSURE: 38 MMHG

## 2019-09-12 VITALS — DIASTOLIC BLOOD PRESSURE: 41 MMHG | SYSTOLIC BLOOD PRESSURE: 86 MMHG

## 2019-09-12 VITALS — SYSTOLIC BLOOD PRESSURE: 83 MMHG | DIASTOLIC BLOOD PRESSURE: 38 MMHG

## 2019-09-12 VITALS — SYSTOLIC BLOOD PRESSURE: 89 MMHG | DIASTOLIC BLOOD PRESSURE: 45 MMHG

## 2019-09-12 VITALS — DIASTOLIC BLOOD PRESSURE: 43 MMHG | SYSTOLIC BLOOD PRESSURE: 88 MMHG

## 2019-09-12 VITALS — DIASTOLIC BLOOD PRESSURE: 48 MMHG | SYSTOLIC BLOOD PRESSURE: 81 MMHG

## 2019-09-12 VITALS — DIASTOLIC BLOOD PRESSURE: 56 MMHG | SYSTOLIC BLOOD PRESSURE: 82 MMHG

## 2019-09-12 VITALS — DIASTOLIC BLOOD PRESSURE: 58 MMHG | SYSTOLIC BLOOD PRESSURE: 94 MMHG

## 2019-09-12 VITALS — SYSTOLIC BLOOD PRESSURE: 95 MMHG | DIASTOLIC BLOOD PRESSURE: 52 MMHG

## 2019-09-12 VITALS — SYSTOLIC BLOOD PRESSURE: 84 MMHG | DIASTOLIC BLOOD PRESSURE: 43 MMHG

## 2019-09-12 VITALS — SYSTOLIC BLOOD PRESSURE: 81 MMHG | DIASTOLIC BLOOD PRESSURE: 48 MMHG

## 2019-09-12 VITALS — SYSTOLIC BLOOD PRESSURE: 107 MMHG | DIASTOLIC BLOOD PRESSURE: 53 MMHG

## 2019-09-12 VITALS — SYSTOLIC BLOOD PRESSURE: 71 MMHG | DIASTOLIC BLOOD PRESSURE: 40 MMHG

## 2019-09-12 VITALS — DIASTOLIC BLOOD PRESSURE: 51 MMHG | SYSTOLIC BLOOD PRESSURE: 90 MMHG

## 2019-09-12 VITALS — DIASTOLIC BLOOD PRESSURE: 44 MMHG | SYSTOLIC BLOOD PRESSURE: 85 MMHG

## 2019-09-12 VITALS — DIASTOLIC BLOOD PRESSURE: 53 MMHG | SYSTOLIC BLOOD PRESSURE: 100 MMHG

## 2019-09-12 VITALS — DIASTOLIC BLOOD PRESSURE: 42 MMHG | SYSTOLIC BLOOD PRESSURE: 88 MMHG

## 2019-09-12 VITALS — DIASTOLIC BLOOD PRESSURE: 34 MMHG | SYSTOLIC BLOOD PRESSURE: 90 MMHG

## 2019-09-12 VITALS — SYSTOLIC BLOOD PRESSURE: 74 MMHG | DIASTOLIC BLOOD PRESSURE: 43 MMHG

## 2019-09-12 VITALS — DIASTOLIC BLOOD PRESSURE: 42 MMHG | SYSTOLIC BLOOD PRESSURE: 87 MMHG

## 2019-09-12 VITALS — DIASTOLIC BLOOD PRESSURE: 48 MMHG | SYSTOLIC BLOOD PRESSURE: 79 MMHG

## 2019-09-12 VITALS — SYSTOLIC BLOOD PRESSURE: 91 MMHG | DIASTOLIC BLOOD PRESSURE: 52 MMHG

## 2019-09-12 VITALS — DIASTOLIC BLOOD PRESSURE: 36 MMHG | SYSTOLIC BLOOD PRESSURE: 75 MMHG

## 2019-09-12 VITALS — SYSTOLIC BLOOD PRESSURE: 84 MMHG | DIASTOLIC BLOOD PRESSURE: 44 MMHG

## 2019-09-12 VITALS — DIASTOLIC BLOOD PRESSURE: 45 MMHG | SYSTOLIC BLOOD PRESSURE: 73 MMHG

## 2019-09-12 VITALS — SYSTOLIC BLOOD PRESSURE: 93 MMHG | DIASTOLIC BLOOD PRESSURE: 51 MMHG

## 2019-09-12 VITALS — DIASTOLIC BLOOD PRESSURE: 49 MMHG | SYSTOLIC BLOOD PRESSURE: 86 MMHG

## 2019-09-12 VITALS — SYSTOLIC BLOOD PRESSURE: 80 MMHG | DIASTOLIC BLOOD PRESSURE: 50 MMHG

## 2019-09-12 VITALS — SYSTOLIC BLOOD PRESSURE: 82 MMHG | DIASTOLIC BLOOD PRESSURE: 42 MMHG

## 2019-09-12 VITALS — DIASTOLIC BLOOD PRESSURE: 43 MMHG | SYSTOLIC BLOOD PRESSURE: 75 MMHG

## 2019-09-12 VITALS — SYSTOLIC BLOOD PRESSURE: 97 MMHG | DIASTOLIC BLOOD PRESSURE: 49 MMHG

## 2019-09-12 VITALS — DIASTOLIC BLOOD PRESSURE: 46 MMHG | SYSTOLIC BLOOD PRESSURE: 83 MMHG

## 2019-09-12 VITALS — SYSTOLIC BLOOD PRESSURE: 86 MMHG | DIASTOLIC BLOOD PRESSURE: 49 MMHG

## 2019-09-12 VITALS — DIASTOLIC BLOOD PRESSURE: 47 MMHG | SYSTOLIC BLOOD PRESSURE: 85 MMHG

## 2019-09-12 VITALS — DIASTOLIC BLOOD PRESSURE: 71 MMHG | SYSTOLIC BLOOD PRESSURE: 107 MMHG

## 2019-09-12 LAB
ANION GAP SERPL CALC-SCNC: 8 MMOL/L (ref 7–16)
BUN SERPL-MCNC: 44 MG/DL (ref 7–18)
CALCIUM SERPL-MCNC: 8.5 MG/DL (ref 8.5–10.1)
CHLORIDE SERPL-SCNC: 99 MMOL/L (ref 98–107)
CO2 SERPL-SCNC: 27 MMOL/L (ref 21–32)
CREAT SERPL-MCNC: 1.5 MG/DL (ref 0.6–1.3)
GLUCOSE SERPL-MCNC: 122 MG/DL (ref 70–99)
HCT VFR BLD CALC: 37.2 % (ref 37–47)
HGB BLD-MCNC: 11.9 GM/DL (ref 12–15)
MCH RBC QN AUTO: 28.5 PG (ref 26–34)
MCHC RBC AUTO-ENTMCNC: 32 G/DL (ref 28–37)
MCV RBC: 89.2 FL (ref 80–100)
MPV: 9.3 FL. (ref 7.2–11.1)
PLATELET COUNT*: 77 THOU/UL (ref 150–400)
POTASSIUM SERPL-SCNC: 3.8 MMOL/L (ref 3.5–5.1)
RBC # BLD AUTO: 4.17 MIL/UL (ref 4.2–5)
RDW-CV: 21.3 % (ref 10.5–14.5)
SODIUM SERPL-SCNC: 134 MMOL/L (ref 136–145)
WBC # BLD AUTO: 12.8 THOU/UL (ref 4–11)

## 2019-09-12 NOTE — EKG
[place] [address]
Phone:  [phone]                     ELECTROCARDIOGRAM REPORT      
_______________________________________________________________________________
 
Name:       CHRISTY LR                 Room:           [address]    ADM [place]  
M.R.#:  [id number]      Account #:      [id number]  
Admission:  09/10/19     Attend Phys:    JANNY Rizvi
Discharge:               Date of Birth:  [birth year]  
         Report #: [id number]
    [id number]
_______________________________________________________________________________
THIS REPORT FOR:  //name//                      
 
                          [facility]
                                       
Test Date:    2019               Test Time:    11:07:51
Pat Name:     CHRISTY LR             Department:   
Patient ID:   [id number]            Room:         [address]
Gender:       F                        Technician:   JULIA
:          1940               Requested By: Tacho Ortega
Order Number: [id number]    Reading MD:   Zafar Pitts
                                 Measurements
Intervals                              Axis          
Rate:         103                      P:            
AZ:                                    QRS:          93
QRSD:         139                      T:            -86
QT:           382                                    
QTc:          500                                    
                           Interpretive Statements
Atrial fibrillation
Consider left ventricular hypertrophy
Possible Lateral infarct, acute (LAD)
Prolonged QT interval
Compared to ECG 09/10/2019 03:08:21
Myocardial infarct finding now present
Q waves no longer present
 
Electronically Signed On 2019 17:57:40 CDT by Zafar Pitts
[email or web address]
 
 
 
 
 
 
 
 
 
 
 
 
 
 
 
  <ELECTRONICALLY SIGNED>
                                           By: JOEY Pitts MD, [facility]    
  19     1757
D: 19 1107   _____________________________________
T: 19 1107   JOEY Pitts MD, [facility]      /EPI

## 2019-09-12 NOTE — NUR
PATIENT NOT PROGRESSING TOWARDS GOALS. EATS VERY LITTLE AND HAD EMESIS AFTER
LUNCH,RESOLVED NAUSEA WITH ZOFRAN. NOREPINEPHRINE INCREASED TODAY. OUT OF BED
ONCE FOR STOOL ON BSC. FAMILY PRESENT AND ATTENTIVE.

## 2019-09-12 NOTE — NUR
PT. NOT PROGRESSING TOWARDS GOALS.  LEVO GTT REMAINS AT 10MCG/MIN.  5L O2.
FAMILY AT BEDSIDE THROUGHOUT SHIFT.  WILL CONTINUE TO MONITOR.

## 2019-09-12 NOTE — NUR
6814 ASSUMED CARE OF PATIENT. PLEASE SEE DOCUMENTED ASSESSMENT. SON AT
BEDSIDE. LEVOPHED AND DOBUTAMINE DRIPS AS CHARTED

## 2019-09-13 VITALS — DIASTOLIC BLOOD PRESSURE: 48 MMHG | SYSTOLIC BLOOD PRESSURE: 102 MMHG

## 2019-09-13 VITALS — SYSTOLIC BLOOD PRESSURE: 88 MMHG | DIASTOLIC BLOOD PRESSURE: 54 MMHG

## 2019-09-13 VITALS — DIASTOLIC BLOOD PRESSURE: 51 MMHG | SYSTOLIC BLOOD PRESSURE: 96 MMHG

## 2019-09-13 VITALS — SYSTOLIC BLOOD PRESSURE: 102 MMHG | DIASTOLIC BLOOD PRESSURE: 57 MMHG

## 2019-09-13 VITALS — SYSTOLIC BLOOD PRESSURE: 90 MMHG | DIASTOLIC BLOOD PRESSURE: 50 MMHG

## 2019-09-13 VITALS — SYSTOLIC BLOOD PRESSURE: 85 MMHG | DIASTOLIC BLOOD PRESSURE: 50 MMHG

## 2019-09-13 VITALS — DIASTOLIC BLOOD PRESSURE: 55 MMHG | SYSTOLIC BLOOD PRESSURE: 92 MMHG

## 2019-09-13 VITALS — SYSTOLIC BLOOD PRESSURE: 91 MMHG | DIASTOLIC BLOOD PRESSURE: 53 MMHG

## 2019-09-13 VITALS — SYSTOLIC BLOOD PRESSURE: 97 MMHG | DIASTOLIC BLOOD PRESSURE: 56 MMHG

## 2019-09-13 VITALS — DIASTOLIC BLOOD PRESSURE: 62 MMHG | SYSTOLIC BLOOD PRESSURE: 102 MMHG

## 2019-09-13 VITALS — DIASTOLIC BLOOD PRESSURE: 45 MMHG | SYSTOLIC BLOOD PRESSURE: 103 MMHG

## 2019-09-13 VITALS — DIASTOLIC BLOOD PRESSURE: 47 MMHG | SYSTOLIC BLOOD PRESSURE: 101 MMHG

## 2019-09-13 VITALS — DIASTOLIC BLOOD PRESSURE: 54 MMHG | SYSTOLIC BLOOD PRESSURE: 101 MMHG

## 2019-09-13 VITALS — SYSTOLIC BLOOD PRESSURE: 106 MMHG | DIASTOLIC BLOOD PRESSURE: 57 MMHG

## 2019-09-13 VITALS — DIASTOLIC BLOOD PRESSURE: 64 MMHG | SYSTOLIC BLOOD PRESSURE: 93 MMHG

## 2019-09-13 VITALS — SYSTOLIC BLOOD PRESSURE: 105 MMHG | DIASTOLIC BLOOD PRESSURE: 42 MMHG

## 2019-09-13 VITALS — DIASTOLIC BLOOD PRESSURE: 39 MMHG | SYSTOLIC BLOOD PRESSURE: 73 MMHG

## 2019-09-13 VITALS — DIASTOLIC BLOOD PRESSURE: 52 MMHG | SYSTOLIC BLOOD PRESSURE: 91 MMHG

## 2019-09-13 VITALS — DIASTOLIC BLOOD PRESSURE: 59 MMHG | SYSTOLIC BLOOD PRESSURE: 91 MMHG

## 2019-09-13 VITALS — SYSTOLIC BLOOD PRESSURE: 104 MMHG | DIASTOLIC BLOOD PRESSURE: 64 MMHG

## 2019-09-13 VITALS — DIASTOLIC BLOOD PRESSURE: 52 MMHG | SYSTOLIC BLOOD PRESSURE: 85 MMHG

## 2019-09-13 VITALS — DIASTOLIC BLOOD PRESSURE: 47 MMHG | SYSTOLIC BLOOD PRESSURE: 87 MMHG

## 2019-09-13 VITALS — SYSTOLIC BLOOD PRESSURE: 97 MMHG | DIASTOLIC BLOOD PRESSURE: 62 MMHG

## 2019-09-13 VITALS — DIASTOLIC BLOOD PRESSURE: 45 MMHG | SYSTOLIC BLOOD PRESSURE: 84 MMHG

## 2019-09-13 VITALS — SYSTOLIC BLOOD PRESSURE: 91 MMHG | DIASTOLIC BLOOD PRESSURE: 41 MMHG

## 2019-09-13 VITALS — DIASTOLIC BLOOD PRESSURE: 49 MMHG | SYSTOLIC BLOOD PRESSURE: 89 MMHG

## 2019-09-13 VITALS — SYSTOLIC BLOOD PRESSURE: 91 MMHG | DIASTOLIC BLOOD PRESSURE: 62 MMHG

## 2019-09-13 VITALS — DIASTOLIC BLOOD PRESSURE: 48 MMHG | SYSTOLIC BLOOD PRESSURE: 83 MMHG

## 2019-09-13 VITALS — DIASTOLIC BLOOD PRESSURE: 47 MMHG | SYSTOLIC BLOOD PRESSURE: 89 MMHG

## 2019-09-13 VITALS — DIASTOLIC BLOOD PRESSURE: 46 MMHG | SYSTOLIC BLOOD PRESSURE: 77 MMHG

## 2019-09-13 VITALS — SYSTOLIC BLOOD PRESSURE: 113 MMHG | DIASTOLIC BLOOD PRESSURE: 66 MMHG

## 2019-09-13 VITALS — SYSTOLIC BLOOD PRESSURE: 107 MMHG | DIASTOLIC BLOOD PRESSURE: 68 MMHG

## 2019-09-13 VITALS — DIASTOLIC BLOOD PRESSURE: 47 MMHG | SYSTOLIC BLOOD PRESSURE: 93 MMHG

## 2019-09-13 VITALS — DIASTOLIC BLOOD PRESSURE: 62 MMHG | SYSTOLIC BLOOD PRESSURE: 96 MMHG

## 2019-09-13 VITALS — SYSTOLIC BLOOD PRESSURE: 94 MMHG | DIASTOLIC BLOOD PRESSURE: 56 MMHG

## 2019-09-13 VITALS — SYSTOLIC BLOOD PRESSURE: 82 MMHG | DIASTOLIC BLOOD PRESSURE: 46 MMHG

## 2019-09-13 VITALS — DIASTOLIC BLOOD PRESSURE: 42 MMHG | SYSTOLIC BLOOD PRESSURE: 70 MMHG

## 2019-09-13 VITALS — DIASTOLIC BLOOD PRESSURE: 53 MMHG | SYSTOLIC BLOOD PRESSURE: 78 MMHG

## 2019-09-13 VITALS — SYSTOLIC BLOOD PRESSURE: 93 MMHG | DIASTOLIC BLOOD PRESSURE: 60 MMHG

## 2019-09-13 VITALS — SYSTOLIC BLOOD PRESSURE: 93 MMHG | DIASTOLIC BLOOD PRESSURE: 43 MMHG

## 2019-09-13 VITALS — DIASTOLIC BLOOD PRESSURE: 43 MMHG | SYSTOLIC BLOOD PRESSURE: 85 MMHG

## 2019-09-13 VITALS — DIASTOLIC BLOOD PRESSURE: 48 MMHG | SYSTOLIC BLOOD PRESSURE: 76 MMHG

## 2019-09-13 VITALS — DIASTOLIC BLOOD PRESSURE: 61 MMHG | SYSTOLIC BLOOD PRESSURE: 97 MMHG

## 2019-09-13 VITALS — DIASTOLIC BLOOD PRESSURE: 41 MMHG | SYSTOLIC BLOOD PRESSURE: 101 MMHG

## 2019-09-13 VITALS — SYSTOLIC BLOOD PRESSURE: 106 MMHG | DIASTOLIC BLOOD PRESSURE: 59 MMHG

## 2019-09-13 VITALS — SYSTOLIC BLOOD PRESSURE: 93 MMHG | DIASTOLIC BLOOD PRESSURE: 52 MMHG

## 2019-09-13 VITALS — SYSTOLIC BLOOD PRESSURE: 99 MMHG | DIASTOLIC BLOOD PRESSURE: 47 MMHG

## 2019-09-13 VITALS — DIASTOLIC BLOOD PRESSURE: 57 MMHG | SYSTOLIC BLOOD PRESSURE: 108 MMHG

## 2019-09-13 VITALS — SYSTOLIC BLOOD PRESSURE: 104 MMHG | DIASTOLIC BLOOD PRESSURE: 49 MMHG

## 2019-09-13 VITALS — DIASTOLIC BLOOD PRESSURE: 43 MMHG | SYSTOLIC BLOOD PRESSURE: 77 MMHG

## 2019-09-13 VITALS — SYSTOLIC BLOOD PRESSURE: 105 MMHG | DIASTOLIC BLOOD PRESSURE: 55 MMHG

## 2019-09-13 VITALS — DIASTOLIC BLOOD PRESSURE: 66 MMHG | SYSTOLIC BLOOD PRESSURE: 91 MMHG

## 2019-09-13 VITALS — DIASTOLIC BLOOD PRESSURE: 55 MMHG | SYSTOLIC BLOOD PRESSURE: 94 MMHG

## 2019-09-13 VITALS — SYSTOLIC BLOOD PRESSURE: 106 MMHG | DIASTOLIC BLOOD PRESSURE: 58 MMHG

## 2019-09-13 VITALS — SYSTOLIC BLOOD PRESSURE: 67 MMHG | DIASTOLIC BLOOD PRESSURE: 46 MMHG

## 2019-09-13 VITALS — SYSTOLIC BLOOD PRESSURE: 83 MMHG | DIASTOLIC BLOOD PRESSURE: 48 MMHG

## 2019-09-13 VITALS — SYSTOLIC BLOOD PRESSURE: 80 MMHG | DIASTOLIC BLOOD PRESSURE: 41 MMHG

## 2019-09-13 VITALS — DIASTOLIC BLOOD PRESSURE: 65 MMHG | SYSTOLIC BLOOD PRESSURE: 96 MMHG

## 2019-09-13 VITALS — DIASTOLIC BLOOD PRESSURE: 57 MMHG | SYSTOLIC BLOOD PRESSURE: 101 MMHG

## 2019-09-13 VITALS — SYSTOLIC BLOOD PRESSURE: 96 MMHG | DIASTOLIC BLOOD PRESSURE: 43 MMHG

## 2019-09-13 VITALS — DIASTOLIC BLOOD PRESSURE: 43 MMHG | SYSTOLIC BLOOD PRESSURE: 89 MMHG

## 2019-09-13 VITALS — SYSTOLIC BLOOD PRESSURE: 70 MMHG | DIASTOLIC BLOOD PRESSURE: 49 MMHG

## 2019-09-13 VITALS — DIASTOLIC BLOOD PRESSURE: 27 MMHG | SYSTOLIC BLOOD PRESSURE: 89 MMHG

## 2019-09-13 VITALS — DIASTOLIC BLOOD PRESSURE: 58 MMHG | SYSTOLIC BLOOD PRESSURE: 100 MMHG

## 2019-09-13 VITALS — SYSTOLIC BLOOD PRESSURE: 79 MMHG | DIASTOLIC BLOOD PRESSURE: 50 MMHG

## 2019-09-13 VITALS — DIASTOLIC BLOOD PRESSURE: 50 MMHG | SYSTOLIC BLOOD PRESSURE: 95 MMHG

## 2019-09-13 VITALS — DIASTOLIC BLOOD PRESSURE: 55 MMHG | SYSTOLIC BLOOD PRESSURE: 97 MMHG

## 2019-09-13 VITALS — DIASTOLIC BLOOD PRESSURE: 45 MMHG | SYSTOLIC BLOOD PRESSURE: 89 MMHG

## 2019-09-13 LAB
ANION GAP SERPL CALC-SCNC: 7 MMOL/L (ref 7–16)
BUN SERPL-MCNC: 43 MG/DL (ref 7–18)
CALCIUM SERPL-MCNC: 8.3 MG/DL (ref 8.5–10.1)
CHLORIDE SERPL-SCNC: 99 MMOL/L (ref 98–107)
CO2 SERPL-SCNC: 28 MMOL/L (ref 21–32)
CREAT SERPL-MCNC: 1.4 MG/DL (ref 0.6–1.3)
GLUCOSE SERPL-MCNC: 107 MG/DL (ref 70–99)
POTASSIUM SERPL-SCNC: 4.2 MMOL/L (ref 3.5–5.1)
SODIUM SERPL-SCNC: 134 MMOL/L (ref 136–145)

## 2019-09-14 VITALS — SYSTOLIC BLOOD PRESSURE: 85 MMHG | DIASTOLIC BLOOD PRESSURE: 57 MMHG

## 2019-09-14 VITALS — SYSTOLIC BLOOD PRESSURE: 83 MMHG | DIASTOLIC BLOOD PRESSURE: 56 MMHG

## 2019-09-14 VITALS — DIASTOLIC BLOOD PRESSURE: 53 MMHG | SYSTOLIC BLOOD PRESSURE: 100 MMHG

## 2019-09-14 VITALS — DIASTOLIC BLOOD PRESSURE: 39 MMHG | SYSTOLIC BLOOD PRESSURE: 90 MMHG

## 2019-09-14 VITALS — DIASTOLIC BLOOD PRESSURE: 53 MMHG | SYSTOLIC BLOOD PRESSURE: 99 MMHG

## 2019-09-14 VITALS — DIASTOLIC BLOOD PRESSURE: 57 MMHG | SYSTOLIC BLOOD PRESSURE: 94 MMHG

## 2019-09-14 VITALS — DIASTOLIC BLOOD PRESSURE: 68 MMHG | SYSTOLIC BLOOD PRESSURE: 106 MMHG

## 2019-09-14 VITALS — SYSTOLIC BLOOD PRESSURE: 100 MMHG | DIASTOLIC BLOOD PRESSURE: 68 MMHG

## 2019-09-14 VITALS — DIASTOLIC BLOOD PRESSURE: 41 MMHG | SYSTOLIC BLOOD PRESSURE: 84 MMHG

## 2019-09-14 VITALS — DIASTOLIC BLOOD PRESSURE: 73 MMHG | SYSTOLIC BLOOD PRESSURE: 105 MMHG

## 2019-09-14 VITALS — DIASTOLIC BLOOD PRESSURE: 70 MMHG | SYSTOLIC BLOOD PRESSURE: 100 MMHG

## 2019-09-14 VITALS — DIASTOLIC BLOOD PRESSURE: 61 MMHG | SYSTOLIC BLOOD PRESSURE: 96 MMHG

## 2019-09-14 VITALS — SYSTOLIC BLOOD PRESSURE: 104 MMHG | DIASTOLIC BLOOD PRESSURE: 56 MMHG

## 2019-09-14 VITALS — DIASTOLIC BLOOD PRESSURE: 46 MMHG | SYSTOLIC BLOOD PRESSURE: 90 MMHG

## 2019-09-14 VITALS — DIASTOLIC BLOOD PRESSURE: 58 MMHG | SYSTOLIC BLOOD PRESSURE: 95 MMHG

## 2019-09-14 VITALS — SYSTOLIC BLOOD PRESSURE: 100 MMHG | DIASTOLIC BLOOD PRESSURE: 65 MMHG

## 2019-09-14 VITALS — DIASTOLIC BLOOD PRESSURE: 65 MMHG | SYSTOLIC BLOOD PRESSURE: 109 MMHG

## 2019-09-14 VITALS — DIASTOLIC BLOOD PRESSURE: 57 MMHG | SYSTOLIC BLOOD PRESSURE: 96 MMHG

## 2019-09-14 VITALS — DIASTOLIC BLOOD PRESSURE: 61 MMHG | SYSTOLIC BLOOD PRESSURE: 105 MMHG

## 2019-09-14 VITALS — DIASTOLIC BLOOD PRESSURE: 73 MMHG | SYSTOLIC BLOOD PRESSURE: 93 MMHG

## 2019-09-14 VITALS — SYSTOLIC BLOOD PRESSURE: 97 MMHG | DIASTOLIC BLOOD PRESSURE: 51 MMHG

## 2019-09-14 VITALS — SYSTOLIC BLOOD PRESSURE: 92 MMHG | DIASTOLIC BLOOD PRESSURE: 47 MMHG

## 2019-09-14 VITALS — SYSTOLIC BLOOD PRESSURE: 108 MMHG | DIASTOLIC BLOOD PRESSURE: 67 MMHG

## 2019-09-14 VITALS — DIASTOLIC BLOOD PRESSURE: 57 MMHG | SYSTOLIC BLOOD PRESSURE: 105 MMHG

## 2019-09-14 VITALS — DIASTOLIC BLOOD PRESSURE: 42 MMHG | SYSTOLIC BLOOD PRESSURE: 88 MMHG

## 2019-09-14 VITALS — DIASTOLIC BLOOD PRESSURE: 58 MMHG | SYSTOLIC BLOOD PRESSURE: 83 MMHG

## 2019-09-14 VITALS — SYSTOLIC BLOOD PRESSURE: 109 MMHG | DIASTOLIC BLOOD PRESSURE: 68 MMHG

## 2019-09-14 VITALS — DIASTOLIC BLOOD PRESSURE: 61 MMHG | SYSTOLIC BLOOD PRESSURE: 112 MMHG

## 2019-09-14 VITALS — DIASTOLIC BLOOD PRESSURE: 71 MMHG | SYSTOLIC BLOOD PRESSURE: 107 MMHG

## 2019-09-14 VITALS — DIASTOLIC BLOOD PRESSURE: 47 MMHG | SYSTOLIC BLOOD PRESSURE: 80 MMHG

## 2019-09-14 VITALS — SYSTOLIC BLOOD PRESSURE: 115 MMHG | DIASTOLIC BLOOD PRESSURE: 62 MMHG

## 2019-09-14 VITALS — SYSTOLIC BLOOD PRESSURE: 94 MMHG | DIASTOLIC BLOOD PRESSURE: 48 MMHG

## 2019-09-14 VITALS — SYSTOLIC BLOOD PRESSURE: 113 MMHG | DIASTOLIC BLOOD PRESSURE: 68 MMHG

## 2019-09-14 VITALS — DIASTOLIC BLOOD PRESSURE: 47 MMHG | SYSTOLIC BLOOD PRESSURE: 86 MMHG

## 2019-09-14 VITALS — SYSTOLIC BLOOD PRESSURE: 84 MMHG | DIASTOLIC BLOOD PRESSURE: 48 MMHG

## 2019-09-14 VITALS — DIASTOLIC BLOOD PRESSURE: 43 MMHG | SYSTOLIC BLOOD PRESSURE: 87 MMHG

## 2019-09-14 VITALS — SYSTOLIC BLOOD PRESSURE: 85 MMHG | DIASTOLIC BLOOD PRESSURE: 50 MMHG

## 2019-09-14 VITALS — DIASTOLIC BLOOD PRESSURE: 62 MMHG | SYSTOLIC BLOOD PRESSURE: 103 MMHG

## 2019-09-14 VITALS — SYSTOLIC BLOOD PRESSURE: 87 MMHG | DIASTOLIC BLOOD PRESSURE: 50 MMHG

## 2019-09-14 VITALS — DIASTOLIC BLOOD PRESSURE: 55 MMHG | SYSTOLIC BLOOD PRESSURE: 107 MMHG

## 2019-09-14 VITALS — DIASTOLIC BLOOD PRESSURE: 47 MMHG | SYSTOLIC BLOOD PRESSURE: 97 MMHG

## 2019-09-14 VITALS — DIASTOLIC BLOOD PRESSURE: 48 MMHG | SYSTOLIC BLOOD PRESSURE: 86 MMHG

## 2019-09-14 VITALS — DIASTOLIC BLOOD PRESSURE: 63 MMHG | SYSTOLIC BLOOD PRESSURE: 97 MMHG

## 2019-09-14 VITALS — DIASTOLIC BLOOD PRESSURE: 52 MMHG | SYSTOLIC BLOOD PRESSURE: 85 MMHG

## 2019-09-14 VITALS — DIASTOLIC BLOOD PRESSURE: 61 MMHG | SYSTOLIC BLOOD PRESSURE: 103 MMHG

## 2019-09-14 VITALS — DIASTOLIC BLOOD PRESSURE: 43 MMHG | SYSTOLIC BLOOD PRESSURE: 99 MMHG

## 2019-09-14 VITALS — SYSTOLIC BLOOD PRESSURE: 109 MMHG | DIASTOLIC BLOOD PRESSURE: 56 MMHG

## 2019-09-14 VITALS — SYSTOLIC BLOOD PRESSURE: 88 MMHG | DIASTOLIC BLOOD PRESSURE: 50 MMHG

## 2019-09-14 VITALS — SYSTOLIC BLOOD PRESSURE: 107 MMHG | DIASTOLIC BLOOD PRESSURE: 51 MMHG

## 2019-09-14 VITALS — SYSTOLIC BLOOD PRESSURE: 105 MMHG | DIASTOLIC BLOOD PRESSURE: 71 MMHG

## 2019-09-14 VITALS — DIASTOLIC BLOOD PRESSURE: 42 MMHG | SYSTOLIC BLOOD PRESSURE: 82 MMHG

## 2019-09-14 VITALS — DIASTOLIC BLOOD PRESSURE: 69 MMHG | SYSTOLIC BLOOD PRESSURE: 106 MMHG

## 2019-09-14 VITALS — SYSTOLIC BLOOD PRESSURE: 86 MMHG | DIASTOLIC BLOOD PRESSURE: 51 MMHG

## 2019-09-14 VITALS — SYSTOLIC BLOOD PRESSURE: 102 MMHG | DIASTOLIC BLOOD PRESSURE: 71 MMHG

## 2019-09-14 VITALS — SYSTOLIC BLOOD PRESSURE: 93 MMHG | DIASTOLIC BLOOD PRESSURE: 54 MMHG

## 2019-09-14 VITALS — DIASTOLIC BLOOD PRESSURE: 60 MMHG | SYSTOLIC BLOOD PRESSURE: 107 MMHG

## 2019-09-14 VITALS — DIASTOLIC BLOOD PRESSURE: 59 MMHG | SYSTOLIC BLOOD PRESSURE: 89 MMHG

## 2019-09-14 VITALS — SYSTOLIC BLOOD PRESSURE: 97 MMHG | DIASTOLIC BLOOD PRESSURE: 50 MMHG

## 2019-09-14 VITALS — SYSTOLIC BLOOD PRESSURE: 98 MMHG | DIASTOLIC BLOOD PRESSURE: 74 MMHG

## 2019-09-14 VITALS — DIASTOLIC BLOOD PRESSURE: 39 MMHG | SYSTOLIC BLOOD PRESSURE: 94 MMHG

## 2019-09-14 VITALS — DIASTOLIC BLOOD PRESSURE: 63 MMHG | SYSTOLIC BLOOD PRESSURE: 98 MMHG

## 2019-09-14 VITALS — SYSTOLIC BLOOD PRESSURE: 98 MMHG | DIASTOLIC BLOOD PRESSURE: 61 MMHG

## 2019-09-14 VITALS — SYSTOLIC BLOOD PRESSURE: 84 MMHG | DIASTOLIC BLOOD PRESSURE: 47 MMHG

## 2019-09-14 VITALS — SYSTOLIC BLOOD PRESSURE: 106 MMHG | DIASTOLIC BLOOD PRESSURE: 68 MMHG

## 2019-09-14 NOTE — NUR
VITALS STABLE, AFEBRILE. LEVO GTT RUNNING AT 25 MCG/MIN AT THIS TIME, UP FROM
18 MCG/MIN AT THE BEGINNING OF SHIFT. AFIB ON THE MONITOR WITH HR B/N 80-120s.
MAIN COMPLAINT IS BODY ITCHING, REPORTS THIS IS FROM HER LEUKEMIA AND
MEDICATIONS DON'T HELP. OTHERWISE UNEVENTFUL NIGHT, SLEEPS INTERMITTENTLY.
DENIES PAIN. UOP 1000 CC, NO BM. Q2 TURNS FOR SKIN INTEGRITY. CALL LIGHT
WITHIN REACH.

## 2019-09-15 VITALS — DIASTOLIC BLOOD PRESSURE: 27 MMHG | SYSTOLIC BLOOD PRESSURE: 96 MMHG

## 2019-09-15 VITALS — SYSTOLIC BLOOD PRESSURE: 83 MMHG | DIASTOLIC BLOOD PRESSURE: 39 MMHG

## 2019-09-15 VITALS — SYSTOLIC BLOOD PRESSURE: 83 MMHG | DIASTOLIC BLOOD PRESSURE: 46 MMHG

## 2019-09-15 VITALS — DIASTOLIC BLOOD PRESSURE: 63 MMHG | SYSTOLIC BLOOD PRESSURE: 84 MMHG

## 2019-09-15 VITALS — SYSTOLIC BLOOD PRESSURE: 90 MMHG | DIASTOLIC BLOOD PRESSURE: 51 MMHG

## 2019-09-15 VITALS — DIASTOLIC BLOOD PRESSURE: 42 MMHG | SYSTOLIC BLOOD PRESSURE: 82 MMHG

## 2019-09-15 VITALS — SYSTOLIC BLOOD PRESSURE: 103 MMHG | DIASTOLIC BLOOD PRESSURE: 52 MMHG

## 2019-09-15 VITALS — SYSTOLIC BLOOD PRESSURE: 84 MMHG | DIASTOLIC BLOOD PRESSURE: 57 MMHG

## 2019-09-15 VITALS — DIASTOLIC BLOOD PRESSURE: 41 MMHG | SYSTOLIC BLOOD PRESSURE: 84 MMHG

## 2019-09-15 VITALS — DIASTOLIC BLOOD PRESSURE: 29 MMHG | SYSTOLIC BLOOD PRESSURE: 84 MMHG

## 2019-09-15 VITALS — SYSTOLIC BLOOD PRESSURE: 100 MMHG | DIASTOLIC BLOOD PRESSURE: 41 MMHG

## 2019-09-15 VITALS — DIASTOLIC BLOOD PRESSURE: 57 MMHG | SYSTOLIC BLOOD PRESSURE: 92 MMHG

## 2019-09-15 VITALS — DIASTOLIC BLOOD PRESSURE: 55 MMHG | SYSTOLIC BLOOD PRESSURE: 82 MMHG

## 2019-09-15 VITALS — DIASTOLIC BLOOD PRESSURE: 39 MMHG | SYSTOLIC BLOOD PRESSURE: 88 MMHG

## 2019-09-15 VITALS — DIASTOLIC BLOOD PRESSURE: 38 MMHG | SYSTOLIC BLOOD PRESSURE: 80 MMHG

## 2019-09-15 VITALS — SYSTOLIC BLOOD PRESSURE: 88 MMHG | DIASTOLIC BLOOD PRESSURE: 54 MMHG

## 2019-09-15 VITALS — SYSTOLIC BLOOD PRESSURE: 92 MMHG | DIASTOLIC BLOOD PRESSURE: 56 MMHG

## 2019-09-15 VITALS — SYSTOLIC BLOOD PRESSURE: 103 MMHG | DIASTOLIC BLOOD PRESSURE: 62 MMHG

## 2019-09-15 VITALS — DIASTOLIC BLOOD PRESSURE: 48 MMHG | SYSTOLIC BLOOD PRESSURE: 98 MMHG

## 2019-09-15 VITALS — SYSTOLIC BLOOD PRESSURE: 73 MMHG | DIASTOLIC BLOOD PRESSURE: 49 MMHG

## 2019-09-15 VITALS — SYSTOLIC BLOOD PRESSURE: 84 MMHG | DIASTOLIC BLOOD PRESSURE: 52 MMHG

## 2019-09-15 VITALS — SYSTOLIC BLOOD PRESSURE: 103 MMHG | DIASTOLIC BLOOD PRESSURE: 67 MMHG

## 2019-09-15 VITALS — SYSTOLIC BLOOD PRESSURE: 101 MMHG | DIASTOLIC BLOOD PRESSURE: 62 MMHG

## 2019-09-15 VITALS — SYSTOLIC BLOOD PRESSURE: 91 MMHG | DIASTOLIC BLOOD PRESSURE: 31 MMHG

## 2019-09-15 VITALS — SYSTOLIC BLOOD PRESSURE: 91 MMHG | DIASTOLIC BLOOD PRESSURE: 50 MMHG

## 2019-09-15 VITALS — DIASTOLIC BLOOD PRESSURE: 48 MMHG | SYSTOLIC BLOOD PRESSURE: 93 MMHG

## 2019-09-15 VITALS — SYSTOLIC BLOOD PRESSURE: 83 MMHG | DIASTOLIC BLOOD PRESSURE: 44 MMHG

## 2019-09-15 VITALS — SYSTOLIC BLOOD PRESSURE: 94 MMHG | DIASTOLIC BLOOD PRESSURE: 49 MMHG

## 2019-09-15 VITALS — SYSTOLIC BLOOD PRESSURE: 105 MMHG | DIASTOLIC BLOOD PRESSURE: 67 MMHG

## 2019-09-15 VITALS — DIASTOLIC BLOOD PRESSURE: 58 MMHG | SYSTOLIC BLOOD PRESSURE: 96 MMHG

## 2019-09-15 VITALS — DIASTOLIC BLOOD PRESSURE: 73 MMHG | SYSTOLIC BLOOD PRESSURE: 102 MMHG

## 2019-09-15 VITALS — SYSTOLIC BLOOD PRESSURE: 92 MMHG | DIASTOLIC BLOOD PRESSURE: 59 MMHG

## 2019-09-15 VITALS — DIASTOLIC BLOOD PRESSURE: 41 MMHG | SYSTOLIC BLOOD PRESSURE: 85 MMHG

## 2019-09-15 VITALS — SYSTOLIC BLOOD PRESSURE: 85 MMHG | DIASTOLIC BLOOD PRESSURE: 57 MMHG

## 2019-09-15 VITALS — DIASTOLIC BLOOD PRESSURE: 58 MMHG | SYSTOLIC BLOOD PRESSURE: 82 MMHG

## 2019-09-15 VITALS — SYSTOLIC BLOOD PRESSURE: 99 MMHG | DIASTOLIC BLOOD PRESSURE: 64 MMHG

## 2019-09-15 VITALS — SYSTOLIC BLOOD PRESSURE: 91 MMHG | DIASTOLIC BLOOD PRESSURE: 43 MMHG

## 2019-09-15 VITALS — SYSTOLIC BLOOD PRESSURE: 80 MMHG | DIASTOLIC BLOOD PRESSURE: 45 MMHG

## 2019-09-15 VITALS — DIASTOLIC BLOOD PRESSURE: 63 MMHG | SYSTOLIC BLOOD PRESSURE: 98 MMHG

## 2019-09-15 VITALS — SYSTOLIC BLOOD PRESSURE: 88 MMHG | DIASTOLIC BLOOD PRESSURE: 38 MMHG

## 2019-09-15 VITALS — SYSTOLIC BLOOD PRESSURE: 100 MMHG | DIASTOLIC BLOOD PRESSURE: 46 MMHG

## 2019-09-15 VITALS — SYSTOLIC BLOOD PRESSURE: 74 MMHG | DIASTOLIC BLOOD PRESSURE: 38 MMHG

## 2019-09-15 VITALS — DIASTOLIC BLOOD PRESSURE: 50 MMHG | SYSTOLIC BLOOD PRESSURE: 90 MMHG

## 2019-09-15 VITALS — DIASTOLIC BLOOD PRESSURE: 57 MMHG | SYSTOLIC BLOOD PRESSURE: 98 MMHG

## 2019-09-15 VITALS — DIASTOLIC BLOOD PRESSURE: 50 MMHG | SYSTOLIC BLOOD PRESSURE: 87 MMHG

## 2019-09-15 LAB
ANION GAP SERPL CALC-SCNC: 5 MMOL/L (ref 7–16)
BUN SERPL-MCNC: 32 MG/DL (ref 7–18)
CALCIUM SERPL-MCNC: 8.2 MG/DL (ref 8.5–10.1)
CHLORIDE SERPL-SCNC: 98 MMOL/L (ref 98–107)
CO2 SERPL-SCNC: 30 MMOL/L (ref 21–32)
CREAT SERPL-MCNC: 1.2 MG/DL (ref 0.6–1.3)
GLUCOSE SERPL-MCNC: 122 MG/DL (ref 70–99)
HCT VFR BLD CALC: 36.6 % (ref 37–47)
HGB BLD-MCNC: 11.7 GM/DL (ref 12–15)
MCH RBC QN AUTO: 27.8 PG (ref 26–34)
MCHC RBC AUTO-ENTMCNC: 32 G/DL (ref 28–37)
MCV RBC: 86.9 FL (ref 80–100)
MPV: 8.9 FL. (ref 7.2–11.1)
PLATELET COUNT*: 85 THOU/UL (ref 150–400)
POTASSIUM SERPL-SCNC: 4.1 MMOL/L (ref 3.5–5.1)
RBC # BLD AUTO: 4.21 MIL/UL (ref 4.2–5)
RDW-CV: 21.4 % (ref 10.5–14.5)
SODIUM SERPL-SCNC: 133 MMOL/L (ref 136–145)
WBC # BLD AUTO: 13.5 THOU/UL (ref 4–11)

## 2019-09-15 NOTE — NUR
PT MAXED ON LEVO GTT AT THIS TIME, BUT WAS ON 27 MCG/MIN THROUGH MOST OF THE
NIGHT. DENIES PAIN, BUT VERY UNCOMFORTABLE. UNABLE TO GET MUCH SLEEP. HR
MOSTLY B/N 110-130 BPM. BM X1, 900 CC UOP. Q2 TURNS FOR SKIN INTEGRITY. CALL
LIGHT WITHIN REACH.

## 2019-09-15 NOTE — NUR
DR FELICIANO TALKED TO THE FAMILY REGARDING COMFORT CARE, FAMILY WANTS TO
CONTINUE THE SAME TREATMENT AND NO COMFORT CARE FOR NOW. PT BECOMING LESS
RESPONSIVE AND DROWSY. ON MAX SUPPORT OF LEVOPHED. DOBUTAMINE AT 2.5
MCG/KG/MIN. NO OTHER PRESSORS TO BE ADDED PER CARDIOLOGY. ATE 30-40% OF HER
BREAKFAST BUT REFUSED LUNCH AND DINNER. FAMILY WITH THE PATIENT THE WHOLE
TIME. ON O2 SUPPORT OF NC 5L/MIN. A -140s ON THE MONITOR.

## 2019-09-15 NOTE — NUR
NOTE 9/14 SHIFT- PT ALERT AND CONFUSED AT TIMES, PT ASK TO BE PUT BACK INTO
BED WHEN SHE WAS ALREADY IN BED. DR FELICIANO HAD A LENGTHY DISCUSSION
WITH PT, SON AND  REGARDING POOR PROGNOSIS. FURTHER OPTIONS INCUDING
HOSPICE, TURNING OFF PRESSORS VERSUS TITRATING DOWN PRESSORS. LOTS OF FAMILY
VISITING TODAY. DURING ALBARO DISCUSSION, PT VERBALIZED, TO SON AND
. "I'M AT [facility], IT'S OKAY".
PT REMAINS ON LEVOPHED 25MCG, DOBUTAMINE 2.5MCG, VITAL SIGNS DOCUMENTED, WILL
MONITOR CLOSELY.

## 2019-09-16 VITALS — SYSTOLIC BLOOD PRESSURE: 95 MMHG | DIASTOLIC BLOOD PRESSURE: 49 MMHG

## 2019-09-16 VITALS — DIASTOLIC BLOOD PRESSURE: 66 MMHG | SYSTOLIC BLOOD PRESSURE: 102 MMHG

## 2019-09-16 VITALS — SYSTOLIC BLOOD PRESSURE: 103 MMHG | DIASTOLIC BLOOD PRESSURE: 29 MMHG

## 2019-09-16 VITALS — DIASTOLIC BLOOD PRESSURE: 38 MMHG | SYSTOLIC BLOOD PRESSURE: 74 MMHG

## 2019-09-16 VITALS — SYSTOLIC BLOOD PRESSURE: 107 MMHG | DIASTOLIC BLOOD PRESSURE: 47 MMHG

## 2019-09-16 VITALS — SYSTOLIC BLOOD PRESSURE: 86 MMHG | DIASTOLIC BLOOD PRESSURE: 40 MMHG

## 2019-09-16 VITALS — SYSTOLIC BLOOD PRESSURE: 84 MMHG | DIASTOLIC BLOOD PRESSURE: 60 MMHG

## 2019-09-16 VITALS — DIASTOLIC BLOOD PRESSURE: 28 MMHG | SYSTOLIC BLOOD PRESSURE: 78 MMHG

## 2019-09-16 VITALS — SYSTOLIC BLOOD PRESSURE: 77 MMHG | DIASTOLIC BLOOD PRESSURE: 35 MMHG

## 2019-09-16 VITALS — DIASTOLIC BLOOD PRESSURE: 67 MMHG | SYSTOLIC BLOOD PRESSURE: 95 MMHG

## 2019-09-16 VITALS — SYSTOLIC BLOOD PRESSURE: 105 MMHG | DIASTOLIC BLOOD PRESSURE: 72 MMHG

## 2019-09-16 VITALS — DIASTOLIC BLOOD PRESSURE: 42 MMHG | SYSTOLIC BLOOD PRESSURE: 90 MMHG

## 2019-09-16 VITALS — SYSTOLIC BLOOD PRESSURE: 101 MMHG | DIASTOLIC BLOOD PRESSURE: 61 MMHG

## 2019-09-16 VITALS — DIASTOLIC BLOOD PRESSURE: 41 MMHG | SYSTOLIC BLOOD PRESSURE: 88 MMHG

## 2019-09-16 VITALS — DIASTOLIC BLOOD PRESSURE: 40 MMHG | SYSTOLIC BLOOD PRESSURE: 73 MMHG

## 2019-09-16 VITALS — SYSTOLIC BLOOD PRESSURE: 78 MMHG | DIASTOLIC BLOOD PRESSURE: 52 MMHG

## 2019-09-16 VITALS — DIASTOLIC BLOOD PRESSURE: 58 MMHG | SYSTOLIC BLOOD PRESSURE: 94 MMHG

## 2019-09-16 VITALS — SYSTOLIC BLOOD PRESSURE: 93 MMHG | DIASTOLIC BLOOD PRESSURE: 57 MMHG

## 2019-09-16 VITALS — DIASTOLIC BLOOD PRESSURE: 55 MMHG | SYSTOLIC BLOOD PRESSURE: 105 MMHG

## 2019-09-16 VITALS — SYSTOLIC BLOOD PRESSURE: 94 MMHG | DIASTOLIC BLOOD PRESSURE: 51 MMHG

## 2019-09-16 VITALS — DIASTOLIC BLOOD PRESSURE: 56 MMHG | SYSTOLIC BLOOD PRESSURE: 88 MMHG

## 2019-09-16 VITALS — DIASTOLIC BLOOD PRESSURE: 64 MMHG | SYSTOLIC BLOOD PRESSURE: 111 MMHG

## 2019-09-16 VITALS — SYSTOLIC BLOOD PRESSURE: 86 MMHG | DIASTOLIC BLOOD PRESSURE: 42 MMHG

## 2019-09-16 VITALS — SYSTOLIC BLOOD PRESSURE: 105 MMHG | DIASTOLIC BLOOD PRESSURE: 58 MMHG

## 2019-09-16 VITALS — SYSTOLIC BLOOD PRESSURE: 64 MMHG | DIASTOLIC BLOOD PRESSURE: 18 MMHG

## 2019-09-16 VITALS — DIASTOLIC BLOOD PRESSURE: 47 MMHG | SYSTOLIC BLOOD PRESSURE: 68 MMHG

## 2019-09-16 VITALS — DIASTOLIC BLOOD PRESSURE: 57 MMHG | SYSTOLIC BLOOD PRESSURE: 98 MMHG

## 2019-09-16 VITALS — SYSTOLIC BLOOD PRESSURE: 64 MMHG | DIASTOLIC BLOOD PRESSURE: 38 MMHG

## 2019-09-16 VITALS — DIASTOLIC BLOOD PRESSURE: 53 MMHG | SYSTOLIC BLOOD PRESSURE: 86 MMHG

## 2019-09-16 VITALS — DIASTOLIC BLOOD PRESSURE: 58 MMHG | SYSTOLIC BLOOD PRESSURE: 99 MMHG

## 2019-09-16 VITALS — DIASTOLIC BLOOD PRESSURE: 47 MMHG | SYSTOLIC BLOOD PRESSURE: 94 MMHG

## 2019-09-16 VITALS — DIASTOLIC BLOOD PRESSURE: 49 MMHG | SYSTOLIC BLOOD PRESSURE: 101 MMHG

## 2019-09-16 VITALS — DIASTOLIC BLOOD PRESSURE: 55 MMHG | SYSTOLIC BLOOD PRESSURE: 88 MMHG

## 2019-09-16 VITALS — SYSTOLIC BLOOD PRESSURE: 98 MMHG | DIASTOLIC BLOOD PRESSURE: 45 MMHG

## 2019-09-16 VITALS — SYSTOLIC BLOOD PRESSURE: 83 MMHG | DIASTOLIC BLOOD PRESSURE: 54 MMHG

## 2019-09-16 VITALS — DIASTOLIC BLOOD PRESSURE: 52 MMHG | SYSTOLIC BLOOD PRESSURE: 89 MMHG

## 2019-09-16 VITALS — DIASTOLIC BLOOD PRESSURE: 55 MMHG | SYSTOLIC BLOOD PRESSURE: 93 MMHG

## 2019-09-16 VITALS — SYSTOLIC BLOOD PRESSURE: 80 MMHG | DIASTOLIC BLOOD PRESSURE: 51 MMHG

## 2019-09-16 VITALS — SYSTOLIC BLOOD PRESSURE: 90 MMHG | DIASTOLIC BLOOD PRESSURE: 51 MMHG

## 2019-09-16 VITALS — SYSTOLIC BLOOD PRESSURE: 99 MMHG | DIASTOLIC BLOOD PRESSURE: 58 MMHG

## 2019-09-16 VITALS — DIASTOLIC BLOOD PRESSURE: 54 MMHG | SYSTOLIC BLOOD PRESSURE: 101 MMHG

## 2019-09-16 VITALS — SYSTOLIC BLOOD PRESSURE: 75 MMHG | DIASTOLIC BLOOD PRESSURE: 59 MMHG

## 2019-09-16 VITALS — SYSTOLIC BLOOD PRESSURE: 101 MMHG | DIASTOLIC BLOOD PRESSURE: 53 MMHG

## 2019-09-16 VITALS — DIASTOLIC BLOOD PRESSURE: 44 MMHG | SYSTOLIC BLOOD PRESSURE: 95 MMHG

## 2019-09-16 VITALS — DIASTOLIC BLOOD PRESSURE: 58 MMHG | SYSTOLIC BLOOD PRESSURE: 109 MMHG

## 2019-09-16 VITALS — DIASTOLIC BLOOD PRESSURE: 48 MMHG | SYSTOLIC BLOOD PRESSURE: 77 MMHG

## 2019-09-16 VITALS — SYSTOLIC BLOOD PRESSURE: 87 MMHG | DIASTOLIC BLOOD PRESSURE: 42 MMHG

## 2019-09-16 VITALS — DIASTOLIC BLOOD PRESSURE: 28 MMHG | SYSTOLIC BLOOD PRESSURE: 64 MMHG

## 2019-09-16 VITALS — DIASTOLIC BLOOD PRESSURE: 61 MMHG | SYSTOLIC BLOOD PRESSURE: 99 MMHG

## 2019-09-16 VITALS — SYSTOLIC BLOOD PRESSURE: 89 MMHG | DIASTOLIC BLOOD PRESSURE: 54 MMHG

## 2019-09-16 VITALS — SYSTOLIC BLOOD PRESSURE: 96 MMHG | DIASTOLIC BLOOD PRESSURE: 58 MMHG

## 2019-09-16 VITALS — DIASTOLIC BLOOD PRESSURE: 56 MMHG | SYSTOLIC BLOOD PRESSURE: 103 MMHG

## 2019-09-16 VITALS — DIASTOLIC BLOOD PRESSURE: 62 MMHG | SYSTOLIC BLOOD PRESSURE: 95 MMHG

## 2019-09-16 VITALS — SYSTOLIC BLOOD PRESSURE: 92 MMHG | DIASTOLIC BLOOD PRESSURE: 31 MMHG

## 2019-09-16 VITALS — SYSTOLIC BLOOD PRESSURE: 100 MMHG | DIASTOLIC BLOOD PRESSURE: 46 MMHG

## 2019-09-16 VITALS — SYSTOLIC BLOOD PRESSURE: 64 MMHG | DIASTOLIC BLOOD PRESSURE: 14 MMHG

## 2019-09-16 VITALS — SYSTOLIC BLOOD PRESSURE: 97 MMHG | DIASTOLIC BLOOD PRESSURE: 57 MMHG

## 2019-09-16 VITALS — DIASTOLIC BLOOD PRESSURE: 61 MMHG | SYSTOLIC BLOOD PRESSURE: 103 MMHG

## 2019-09-16 VITALS — DIASTOLIC BLOOD PRESSURE: 64 MMHG | SYSTOLIC BLOOD PRESSURE: 97 MMHG

## 2019-09-16 VITALS — DIASTOLIC BLOOD PRESSURE: 49 MMHG | SYSTOLIC BLOOD PRESSURE: 103 MMHG

## 2019-09-16 VITALS — DIASTOLIC BLOOD PRESSURE: 53 MMHG | SYSTOLIC BLOOD PRESSURE: 83 MMHG

## 2019-09-16 LAB
ANION GAP SERPL CALC-SCNC: 6 MMOL/L (ref 7–16)
BUN SERPL-MCNC: 32 MG/DL (ref 7–18)
CALCIUM SERPL-MCNC: 8.2 MG/DL (ref 8.5–10.1)
CHLORIDE SERPL-SCNC: 99 MMOL/L (ref 98–107)
CO2 SERPL-SCNC: 31 MMOL/L (ref 21–32)
CREAT SERPL-MCNC: 1.2 MG/DL (ref 0.6–1.3)
GLUCOSE SERPL-MCNC: 153 MG/DL (ref 70–99)
HCT VFR BLD CALC: 29.4 % (ref 37–47)
HGB BLD-MCNC: 9.6 GM/DL (ref 12–15)
MCH RBC QN AUTO: 28.7 PG (ref 26–34)
MCHC RBC AUTO-ENTMCNC: 32.7 G/DL (ref 28–37)
MCV RBC: 87.7 FL (ref 80–100)
MPV: 8.9 FL. (ref 7.2–11.1)
PLATELET COUNT*: 80 THOU/UL (ref 150–400)
POTASSIUM SERPL-SCNC: 4.2 MMOL/L (ref 3.5–5.1)
RBC # BLD AUTO: 3.36 MIL/UL (ref 4.2–5)
RDW-CV: 21.6 % (ref 10.5–14.5)
SODIUM SERPL-SCNC: 136 MMOL/L (ref 136–145)
WBC # BLD AUTO: 11 THOU/UL (ref 4–11)

## 2019-09-16 NOTE — NUR
PT'S APPETITE BETTER THAN YESTERDAY. LEVOPHED TITRATED DOWN TO 26 MCG/MIN. ON
HEATED HIGH FLOW OXYGEN SUPPORT, TITRATED BY RT. TURNS HERSELF. HAD 3 EPISODES
OF BM, SOFT AND FORMED. GETS UPTO THE BSC WITH MIN ASSIST.

## 2019-09-16 NOTE — NUR
SPOKE WITH DR. FELICIANO, HE SAID HE TALKED WITH , SON, AND DTR OVER THE
WEEKEND ABOUT PATIENT'S CONDITION. HE SAID FAMILY BECAME UPSET WHEN HE
MENTIONED HOSPICE, BUT IF HE USED THE WORD COMFORT CARE, THEY SEEMED MORE
RECEPTIVE. SPOKE WITH SON AT BEDSIDE AFTER HE SPOKE WITH DR. FELICIANO THIS
MORNING. HE SAID HE WILL TALK WITH HIS FATHER AND OTHER FAMILY MEMBERS, HE
SAID IF IT DIDN'T MEAN THAT SHE WOULD IMMEDIATELY BECOME COMATOSE, FAMILY
MIGHT BE OPEN TO TAKING HER HOME 'TO KEEP HER COMFORTABLE, IF WE COULD HAVE
THE OXYGEN AND EVERYTHING THAT WE NEED.'    TOLD HIM THAT WE COULD
SET UP A MEETING WITH WHATEVER FAMILY MEMBERS TO DISCUSS FURTHER. DID BRIEFLY
MENTION TO HIM THAT HOSPICE WOULD BE THE SERVICE THAT COULD HELP KEEP HER
COMFORTABLE AT HOME, BUT THE FAMILY COULD DETERMINE HOW MUCH PRESENCE THEY
WANTED HOSPICE TO HAVE IN THE HOME. SON SEEMED RECEPTIVE TO THAT, ASSURED HIM
THAT HOSPICE DIDN'T MEAN HOSPICE WOULD COME IN AND TAKE OVER. PT CURRENTLY ON
TWO PRESSORS, WITH A THIRD ORDERED IF NEEDED. DR. FELICIANO EXPLAINED TO THE SON
THAT HIS GOAL WAS TO TRY AND TITRATE PT OFF THE PRESSORS, NOT HAVING TO ADD
MORE.

## 2019-09-17 VITALS — DIASTOLIC BLOOD PRESSURE: 30 MMHG | SYSTOLIC BLOOD PRESSURE: 70 MMHG

## 2019-09-17 VITALS — DIASTOLIC BLOOD PRESSURE: 54 MMHG | SYSTOLIC BLOOD PRESSURE: 117 MMHG

## 2019-09-17 VITALS — SYSTOLIC BLOOD PRESSURE: 125 MMHG | DIASTOLIC BLOOD PRESSURE: 101 MMHG

## 2019-09-17 VITALS — SYSTOLIC BLOOD PRESSURE: 113 MMHG | DIASTOLIC BLOOD PRESSURE: 93 MMHG

## 2019-09-17 VITALS — SYSTOLIC BLOOD PRESSURE: 92 MMHG | DIASTOLIC BLOOD PRESSURE: 56 MMHG

## 2019-09-17 VITALS — DIASTOLIC BLOOD PRESSURE: 93 MMHG | SYSTOLIC BLOOD PRESSURE: 113 MMHG

## 2019-09-17 VITALS — DIASTOLIC BLOOD PRESSURE: 61 MMHG | SYSTOLIC BLOOD PRESSURE: 99 MMHG

## 2019-09-17 VITALS — SYSTOLIC BLOOD PRESSURE: 103 MMHG | DIASTOLIC BLOOD PRESSURE: 60 MMHG

## 2019-09-17 VITALS — SYSTOLIC BLOOD PRESSURE: 108 MMHG | DIASTOLIC BLOOD PRESSURE: 55 MMHG

## 2019-09-17 VITALS — SYSTOLIC BLOOD PRESSURE: 88 MMHG | DIASTOLIC BLOOD PRESSURE: 43 MMHG

## 2019-09-17 VITALS — DIASTOLIC BLOOD PRESSURE: 48 MMHG | SYSTOLIC BLOOD PRESSURE: 94 MMHG

## 2019-09-17 VITALS — SYSTOLIC BLOOD PRESSURE: 109 MMHG | DIASTOLIC BLOOD PRESSURE: 67 MMHG

## 2019-09-17 VITALS — DIASTOLIC BLOOD PRESSURE: 37 MMHG | SYSTOLIC BLOOD PRESSURE: 108 MMHG

## 2019-09-17 VITALS — DIASTOLIC BLOOD PRESSURE: 50 MMHG | SYSTOLIC BLOOD PRESSURE: 103 MMHG

## 2019-09-17 VITALS — DIASTOLIC BLOOD PRESSURE: 52 MMHG | SYSTOLIC BLOOD PRESSURE: 100 MMHG

## 2019-09-17 VITALS — DIASTOLIC BLOOD PRESSURE: 56 MMHG | SYSTOLIC BLOOD PRESSURE: 99 MMHG

## 2019-09-17 VITALS — SYSTOLIC BLOOD PRESSURE: 84 MMHG | DIASTOLIC BLOOD PRESSURE: 38 MMHG

## 2019-09-17 VITALS — DIASTOLIC BLOOD PRESSURE: 49 MMHG | SYSTOLIC BLOOD PRESSURE: 110 MMHG

## 2019-09-17 VITALS — DIASTOLIC BLOOD PRESSURE: 71 MMHG | SYSTOLIC BLOOD PRESSURE: 111 MMHG

## 2019-09-17 VITALS — DIASTOLIC BLOOD PRESSURE: 47 MMHG | SYSTOLIC BLOOD PRESSURE: 97 MMHG

## 2019-09-17 VITALS — DIASTOLIC BLOOD PRESSURE: 54 MMHG | SYSTOLIC BLOOD PRESSURE: 84 MMHG

## 2019-09-17 VITALS — DIASTOLIC BLOOD PRESSURE: 61 MMHG | SYSTOLIC BLOOD PRESSURE: 106 MMHG

## 2019-09-17 LAB
ABSOLUTE EOSINOPHILS: 0.1 THOU/UL (ref 0–0.7)
ABSOLUTE MONOCYTES: 0.3 THOU/UL (ref 0–1.2)
ALBUMIN SERPL-MCNC: 2.4 G/DL (ref 3.4–5)
ALP SERPL-CCNC: 73 U/L (ref 46–116)
ALT SERPL-CCNC: 9 U/L (ref 30–65)
ANION GAP SERPL CALC-SCNC: 4 MMOL/L (ref 7–16)
ANISOCYTOSIS BLD QL SMEAR: (no result)
AST SERPL-CCNC: 21 U/L (ref 15–37)
BF LYMPHOCYTES: 2 %
BF MONOCYTES: 2 %
BF POLYS: 96 %
BF RBC: (no result) /MM3
BILIRUB SERPL-MCNC: 0.5 MG/DL
BUN SERPL-MCNC: 30 MG/DL (ref 7–18)
CALCIUM SERPL-MCNC: 8 MG/DL (ref 8.5–10.1)
CHLORIDE SERPL-SCNC: 98 MMOL/L (ref 98–107)
CLARITY UR: (no result)
CO2 SERPL-SCNC: 31 MMOL/L (ref 21–32)
CREAT SERPL-MCNC: 1.2 MG/DL (ref 0.6–1.3)
EOSINOPHIL NFR BLD: 1 %
GLUCOSE SERPL-MCNC: 96 MG/DL (ref 70–99)
GRANULOCYTES NFR BLD MANUAL: 77 %
HCT VFR BLD CALC: 23.1 % (ref 37–47)
HGB BLD-MCNC: 7.5 GM/DL (ref 12–15)
LYMPHOCYTES # BLD: 1 THOU/UL (ref 0.8–5.3)
LYMPHOCYTES NFR BLD AUTO: 16 %
MCH RBC QN AUTO: 28.3 PG (ref 26–34)
MCHC RBC AUTO-ENTMCNC: 32.4 G/DL (ref 28–37)
MCV RBC: 87.3 FL (ref 80–100)
MONOCYTES NFR BLD: 4 %
MPV: 8.1 FL. (ref 7.2–11.1)
NEUTROPHILS # BLD: 5 THOU/UL (ref 1.6–8.1)
NEUTS BAND NFR BLD: 2 %
NUCLEATED RBCS: 0 /100WBC
OVALOCYTES BLD QL SMEAR: (no result)
PLATELET # BLD EST: (no result) 10*3/UL
PLATELET COUNT*: 63 THOU/UL (ref 150–400)
POIKILOCYTOSIS BLD QL SMEAR: (no result)
POLYCHROMASIA BLD QL SMEAR: (no result)
POTASSIUM SERPL-SCNC: 4.7 MMOL/L (ref 3.5–5.1)
PROT SERPL-MCNC: 5.7 G/DL (ref 6.4–8.2)
RBC # BLD AUTO: 2.65 MIL/UL (ref 4.2–5)
RDW-CV: 21.4 % (ref 10.5–14.5)
SODIUM SERPL-SCNC: 133 MMOL/L (ref 136–145)
SOURCE: (no result)
SPECIMEN VOL 24H UR: 2550 ML
TOTAL CELL COUNT: 3750 /MM3
WBC # BLD AUTO: 6.3 THOU/UL (ref 4–11)

## 2019-09-17 PROCEDURE — 0W993ZZ DRAINAGE OF RIGHT PLEURAL CAVITY, PERCUTANEOUS APPROACH: ICD-10-PCS | Performed by: INTERNAL MEDICINE

## 2019-09-17 NOTE — NUR
ICU rounds: pt nurse discussed target systolic of 80 and discussed that pt and
pt family have not yet made any more decisions regarding possible hospice care
in the home.  SW to continue to follow to assist with safe dc planning and
support as needed.

## 2019-09-17 NOTE — NUR
R THORACENTESIS COMPLETE AT 1905.  PT SLEEPING AT THIS TIME, EASILY AROUSED.
DENIES PAIN, REPORTS IMPROVED EASE OF BREATHING.  HEATED HI FLOW NC AT 60%
FIO2.  RR 21, O2 %.  LUNG SOUNDS CTA, DIMINISHED BILATERALLY, R>L.
BANDAGE TO R LOWER BACK C/D/I.  FAMILY AT BEDSIDE.  CALL LIGHT WITHIN REACH.

## 2019-09-18 VITALS — DIASTOLIC BLOOD PRESSURE: 45 MMHG | SYSTOLIC BLOOD PRESSURE: 86 MMHG

## 2019-09-18 VITALS — DIASTOLIC BLOOD PRESSURE: 47 MMHG | SYSTOLIC BLOOD PRESSURE: 87 MMHG

## 2019-09-18 VITALS — SYSTOLIC BLOOD PRESSURE: 91 MMHG | DIASTOLIC BLOOD PRESSURE: 58 MMHG

## 2019-09-18 VITALS — DIASTOLIC BLOOD PRESSURE: 50 MMHG | SYSTOLIC BLOOD PRESSURE: 95 MMHG

## 2019-09-18 VITALS — DIASTOLIC BLOOD PRESSURE: 40 MMHG | SYSTOLIC BLOOD PRESSURE: 92 MMHG

## 2019-09-18 VITALS — DIASTOLIC BLOOD PRESSURE: 43 MMHG | SYSTOLIC BLOOD PRESSURE: 94 MMHG

## 2019-09-18 VITALS — DIASTOLIC BLOOD PRESSURE: 49 MMHG | SYSTOLIC BLOOD PRESSURE: 89 MMHG

## 2019-09-18 VITALS — DIASTOLIC BLOOD PRESSURE: 43 MMHG | SYSTOLIC BLOOD PRESSURE: 93 MMHG

## 2019-09-18 VITALS — DIASTOLIC BLOOD PRESSURE: 49 MMHG | SYSTOLIC BLOOD PRESSURE: 84 MMHG

## 2019-09-18 VITALS — SYSTOLIC BLOOD PRESSURE: 85 MMHG | DIASTOLIC BLOOD PRESSURE: 43 MMHG

## 2019-09-18 VITALS — SYSTOLIC BLOOD PRESSURE: 88 MMHG | DIASTOLIC BLOOD PRESSURE: 44 MMHG

## 2019-09-18 VITALS — SYSTOLIC BLOOD PRESSURE: 117 MMHG | DIASTOLIC BLOOD PRESSURE: 18 MMHG

## 2019-09-18 VITALS — DIASTOLIC BLOOD PRESSURE: 54 MMHG | SYSTOLIC BLOOD PRESSURE: 96 MMHG

## 2019-09-18 VITALS — DIASTOLIC BLOOD PRESSURE: 50 MMHG | SYSTOLIC BLOOD PRESSURE: 96 MMHG

## 2019-09-18 VITALS — DIASTOLIC BLOOD PRESSURE: 58 MMHG | SYSTOLIC BLOOD PRESSURE: 91 MMHG

## 2019-09-18 VITALS — DIASTOLIC BLOOD PRESSURE: 55 MMHG | SYSTOLIC BLOOD PRESSURE: 87 MMHG

## 2019-09-18 VITALS — DIASTOLIC BLOOD PRESSURE: 19 MMHG | SYSTOLIC BLOOD PRESSURE: 112 MMHG

## 2019-09-18 VITALS — SYSTOLIC BLOOD PRESSURE: 95 MMHG | DIASTOLIC BLOOD PRESSURE: 53 MMHG

## 2019-09-18 VITALS — SYSTOLIC BLOOD PRESSURE: 68 MMHG | DIASTOLIC BLOOD PRESSURE: 43 MMHG

## 2019-09-18 VITALS — DIASTOLIC BLOOD PRESSURE: 48 MMHG | SYSTOLIC BLOOD PRESSURE: 92 MMHG

## 2019-09-18 VITALS — SYSTOLIC BLOOD PRESSURE: 93 MMHG | DIASTOLIC BLOOD PRESSURE: 41 MMHG

## 2019-09-18 VITALS — SYSTOLIC BLOOD PRESSURE: 97 MMHG | DIASTOLIC BLOOD PRESSURE: 52 MMHG

## 2019-09-18 VITALS — DIASTOLIC BLOOD PRESSURE: 46 MMHG | SYSTOLIC BLOOD PRESSURE: 78 MMHG

## 2019-09-18 VITALS — SYSTOLIC BLOOD PRESSURE: 83 MMHG | DIASTOLIC BLOOD PRESSURE: 47 MMHG

## 2019-09-18 VITALS — SYSTOLIC BLOOD PRESSURE: 100 MMHG | DIASTOLIC BLOOD PRESSURE: 41 MMHG

## 2019-09-18 VITALS — DIASTOLIC BLOOD PRESSURE: 52 MMHG | SYSTOLIC BLOOD PRESSURE: 100 MMHG

## 2019-09-18 VITALS — DIASTOLIC BLOOD PRESSURE: 50 MMHG | SYSTOLIC BLOOD PRESSURE: 77 MMHG

## 2019-09-18 VITALS — SYSTOLIC BLOOD PRESSURE: 93 MMHG | DIASTOLIC BLOOD PRESSURE: 53 MMHG

## 2019-09-18 VITALS — SYSTOLIC BLOOD PRESSURE: 89 MMHG | DIASTOLIC BLOOD PRESSURE: 53 MMHG

## 2019-09-18 VITALS — SYSTOLIC BLOOD PRESSURE: 86 MMHG | DIASTOLIC BLOOD PRESSURE: 60 MMHG

## 2019-09-18 VITALS — DIASTOLIC BLOOD PRESSURE: 46 MMHG | SYSTOLIC BLOOD PRESSURE: 91 MMHG

## 2019-09-18 VITALS — SYSTOLIC BLOOD PRESSURE: 100 MMHG | DIASTOLIC BLOOD PRESSURE: 43 MMHG

## 2019-09-18 VITALS — SYSTOLIC BLOOD PRESSURE: 92 MMHG | DIASTOLIC BLOOD PRESSURE: 57 MMHG

## 2019-09-18 VITALS — DIASTOLIC BLOOD PRESSURE: 52 MMHG | SYSTOLIC BLOOD PRESSURE: 94 MMHG

## 2019-09-18 VITALS — SYSTOLIC BLOOD PRESSURE: 100 MMHG | DIASTOLIC BLOOD PRESSURE: 49 MMHG

## 2019-09-18 VITALS — SYSTOLIC BLOOD PRESSURE: 102 MMHG | DIASTOLIC BLOOD PRESSURE: 53 MMHG

## 2019-09-18 VITALS — SYSTOLIC BLOOD PRESSURE: 104 MMHG | DIASTOLIC BLOOD PRESSURE: 50 MMHG

## 2019-09-18 VITALS — DIASTOLIC BLOOD PRESSURE: 57 MMHG | SYSTOLIC BLOOD PRESSURE: 82 MMHG

## 2019-09-18 VITALS — SYSTOLIC BLOOD PRESSURE: 47 MMHG | DIASTOLIC BLOOD PRESSURE: 15 MMHG

## 2019-09-18 VITALS — DIASTOLIC BLOOD PRESSURE: 41 MMHG | SYSTOLIC BLOOD PRESSURE: 83 MMHG

## 2019-09-18 VITALS — SYSTOLIC BLOOD PRESSURE: 88 MMHG | DIASTOLIC BLOOD PRESSURE: 55 MMHG

## 2019-09-18 VITALS — DIASTOLIC BLOOD PRESSURE: 36 MMHG | SYSTOLIC BLOOD PRESSURE: 95 MMHG

## 2019-09-18 VITALS — DIASTOLIC BLOOD PRESSURE: 47 MMHG | SYSTOLIC BLOOD PRESSURE: 90 MMHG

## 2019-09-18 VITALS — DIASTOLIC BLOOD PRESSURE: 46 MMHG | SYSTOLIC BLOOD PRESSURE: 102 MMHG

## 2019-09-18 VITALS — DIASTOLIC BLOOD PRESSURE: 56 MMHG | SYSTOLIC BLOOD PRESSURE: 110 MMHG

## 2019-09-18 VITALS — SYSTOLIC BLOOD PRESSURE: 100 MMHG | DIASTOLIC BLOOD PRESSURE: 47 MMHG

## 2019-09-18 VITALS — SYSTOLIC BLOOD PRESSURE: 100 MMHG | DIASTOLIC BLOOD PRESSURE: 55 MMHG

## 2019-09-18 VITALS — DIASTOLIC BLOOD PRESSURE: 48 MMHG | SYSTOLIC BLOOD PRESSURE: 83 MMHG

## 2019-09-18 VITALS — DIASTOLIC BLOOD PRESSURE: 45 MMHG | SYSTOLIC BLOOD PRESSURE: 99 MMHG

## 2019-09-18 VITALS — SYSTOLIC BLOOD PRESSURE: 86 MMHG | DIASTOLIC BLOOD PRESSURE: 52 MMHG

## 2019-09-18 VITALS — SYSTOLIC BLOOD PRESSURE: 156 MMHG | DIASTOLIC BLOOD PRESSURE: 129 MMHG

## 2019-09-18 VITALS — SYSTOLIC BLOOD PRESSURE: 98 MMHG | DIASTOLIC BLOOD PRESSURE: 42 MMHG

## 2019-09-18 VITALS — SYSTOLIC BLOOD PRESSURE: 83 MMHG | DIASTOLIC BLOOD PRESSURE: 52 MMHG

## 2019-09-18 VITALS — DIASTOLIC BLOOD PRESSURE: 53 MMHG | SYSTOLIC BLOOD PRESSURE: 94 MMHG

## 2019-09-18 VITALS — DIASTOLIC BLOOD PRESSURE: 55 MMHG | SYSTOLIC BLOOD PRESSURE: 90 MMHG

## 2019-09-18 VITALS — SYSTOLIC BLOOD PRESSURE: 89 MMHG | DIASTOLIC BLOOD PRESSURE: 52 MMHG

## 2019-09-18 VITALS — SYSTOLIC BLOOD PRESSURE: 87 MMHG | DIASTOLIC BLOOD PRESSURE: 49 MMHG

## 2019-09-18 VITALS — DIASTOLIC BLOOD PRESSURE: 59 MMHG | SYSTOLIC BLOOD PRESSURE: 92 MMHG

## 2019-09-18 VITALS — SYSTOLIC BLOOD PRESSURE: 94 MMHG | DIASTOLIC BLOOD PRESSURE: 49 MMHG

## 2019-09-18 VITALS — DIASTOLIC BLOOD PRESSURE: 42 MMHG | SYSTOLIC BLOOD PRESSURE: 99 MMHG

## 2019-09-18 VITALS — DIASTOLIC BLOOD PRESSURE: 41 MMHG | SYSTOLIC BLOOD PRESSURE: 79 MMHG

## 2019-09-18 VITALS — SYSTOLIC BLOOD PRESSURE: 85 MMHG | DIASTOLIC BLOOD PRESSURE: 59 MMHG

## 2019-09-18 VITALS — SYSTOLIC BLOOD PRESSURE: 82 MMHG | DIASTOLIC BLOOD PRESSURE: 49 MMHG

## 2019-09-18 VITALS — DIASTOLIC BLOOD PRESSURE: 49 MMHG | SYSTOLIC BLOOD PRESSURE: 96 MMHG

## 2019-09-18 VITALS — SYSTOLIC BLOOD PRESSURE: 99 MMHG | DIASTOLIC BLOOD PRESSURE: 56 MMHG

## 2019-09-18 VITALS — DIASTOLIC BLOOD PRESSURE: 50 MMHG | SYSTOLIC BLOOD PRESSURE: 86 MMHG

## 2019-09-18 VITALS — SYSTOLIC BLOOD PRESSURE: 101 MMHG | DIASTOLIC BLOOD PRESSURE: 67 MMHG

## 2019-09-18 VITALS — SYSTOLIC BLOOD PRESSURE: 84 MMHG | DIASTOLIC BLOOD PRESSURE: 51 MMHG

## 2019-09-18 VITALS — DIASTOLIC BLOOD PRESSURE: 41 MMHG | SYSTOLIC BLOOD PRESSURE: 93 MMHG

## 2019-09-18 VITALS — SYSTOLIC BLOOD PRESSURE: 97 MMHG | DIASTOLIC BLOOD PRESSURE: 51 MMHG

## 2019-09-18 LAB
ABSOLUTE BASOPHILS: 0.3 THOU/UL (ref 0–0.2)
ABSOLUTE EOSINOPHILS: 0 THOU/UL (ref 0–0.7)
ABSOLUTE MONOCYTES: 1.2 THOU/UL (ref 0–1.2)
ALBUMIN SERPL-MCNC: 2.4 G/DL (ref 3.4–5)
ALP SERPL-CCNC: 86 U/L (ref 46–116)
ALT SERPL-CCNC: 10 U/L (ref 30–65)
AMYLASE FLD-CCNC: 24 U/L
ANION GAP SERPL CALC-SCNC: 4 MMOL/L (ref 7–16)
AST SERPL-CCNC: 28 U/L (ref 15–37)
BASOPHILS NFR BLD AUTO: 2 %
BILIRUB SERPL-MCNC: 0.6 MG/DL
BODY FLUID LDH: 286 IU/L
BUN SERPL-MCNC: 34 MG/DL (ref 7–18)
CALCIUM SERPL-MCNC: 8 MG/DL (ref 8.5–10.1)
CHLORIDE SERPL-SCNC: 98 MMOL/L (ref 98–107)
CO2 SERPL-SCNC: 31 MMOL/L (ref 21–32)
CREAT SERPL-MCNC: 1.1 MG/DL (ref 0.6–1.3)
EOSINOPHIL NFR BLD: 0.3 %
GLUCOSE SERPL-MCNC: 118 MG/DL (ref 70–99)
GRANULOCYTES NFR BLD MANUAL: 79.4 %
HCT VFR BLD CALC: 26 % (ref 37–47)
HGB BLD-MCNC: 8.5 GM/DL (ref 12–15)
LYMPHOCYTES # BLD: 1.3 THOU/UL (ref 0.8–5.3)
LYMPHOCYTES NFR BLD AUTO: 9.4 %
MCH RBC QN AUTO: 28.2 PG (ref 26–34)
MCHC RBC AUTO-ENTMCNC: 32.6 G/DL (ref 28–37)
MCV RBC: 86.7 FL (ref 80–100)
MONOCYTES NFR BLD: 8.9 %
MPV: 8.1 FL. (ref 7.2–11.1)
NEUTROPHILS # BLD: 10.7 THOU/UL (ref 1.6–8.1)
NUCLEATED RBCS: 0 /100WBC
PH FLD: 7.5 [PH]
PLATELET COUNT*: 112 THOU/UL (ref 150–400)
POTASSIUM SERPL-SCNC: 4.8 MMOL/L (ref 3.5–5.1)
PREALB SERPL-MCNC: 8.7 MG/DL (ref 18–35.7)
PROT FLD-MCNC: 4.1 G/DL
PROT SERPL-MCNC: 5.8 G/DL (ref 6.4–8.2)
RBC # BLD AUTO: 3 MIL/UL (ref 4.2–5)
RDW-CV: 21.7 % (ref 10.5–14.5)
SODIUM SERPL-SCNC: 133 MMOL/L (ref 136–145)
SOURCE: (no result)
WBC # BLD AUTO: 13.4 THOU/UL (ref 4–11)

## 2019-09-18 NOTE — CON
[address]  [address]                    CONSULTATION                  
_______________________________________________________________________________
 
Name:       CHRISTY LR                 Room:           [address] [place]  
.R.#:  [id number]      Account #:      [id number]  
Admission:  09/10/19     Attend Phys:    JANNY Rizvi
Discharge:               Date of Birth:  [birth year]  
         Report #: [id number]
                                                                     [id number]  
_______________________________________________________________________________
THIS REPORT FOR:  //name//                      
 
CC: Janice Hagen
 
DATE OF SERVICE:  09/17/2019
 
 
ATTENDING PHYSICIAN:  Richar Hall MD
 
LOCATION:  ICU bed #3.
 
INDICATION FOR CONSULTATION:  Acute hypoxic respiratory failure, end-stage liver
disease, multisystem failure.
 
HISTORY OF PRESENT ILLNESS:  The patient is a 78-year-old female, prior smoker
with multiple medical problems.  The patient was admitted to the hospital last
week with possible pneumonia on the right lung.  She has had sepsis.  She has
had multiple medical problems including atrial fibrillation and hypotension. 
She is now on 2 pressors.  She has a history of end-stage liver disease with
cirrhosis, varices and portal hypertension.  She is not a liver transplant
candidate.  The patient has been on high flow O2 since this morning, I was
consulted this afternoon to see her for hypoxemia.  She has a weak cough.  She
is lying at the bedside and the Interventional Radiology is trying to going to
come and see if they can tap off some fluid from her right lung, I am not sure
it is going to make any improvement at this time.  She has had poor urine
output, appears to be at hepatorenal syndrome.  She is alert.  She can talk in a
couple words about half sentences, she is lying in bed.  She denies any pain. 
States she is short of breath, just does not feel well.  She is weak and frail.
 
PAST MEDICAL HISTORY:  Again, atrial fibrillation with RVR, myelofibrosis in the
past, diastolic and systolic congestive heart failure.  She has had end-stage
liver disease with end-stage liver failure with a history of cirrhosis, varices
and portal hypertension.  Again, she is not a liver transplant candidate.  She
has had ATN in the past.  Also, recently, she is on high-flow oxygen, she is on
2 liters at home.  She is not on steroids and nebulizer treatments at home.
 
ALLERGIES:  SHE HAS ALLERGIES OR INTOLERANCE TO PENICILLIN, SULFA AND CODEINE.
 
MEDICATIONS:  She has had one dose of Lasix like 20 mg p.o.  She is on Levophed
and dobutamine for pressors.  Also, Lanoxin 0.125 mg daily, ceftriaxone 1 g IV
piggyback daily, midodrine 15 mg t.i.d., and various replacements from potassium
chloride, etc.
 
 
 
 
[place] [address]                    CONSULTATION                  
_______________________________________________________________________________
 
Name:       CHRISTY LR                 Room:           [address] [place]  
[facility].#:  [id number]      Account #:      [id number]  
Admission:  09/10/19     Attend Phys:    JANNY Rizvi
Discharge:               Date of Birth:  [birth year]  
         Report #: [id number]
                                                                     [id number]  
_______________________________________________________________________________
FAMILY HISTORY:  Negative for premature cardiopulmonary disease.
 
SOCIAL HISTORY:  Supposedly, she is a never smoker, denies any alcohol use.  I
am not sure what her remote alcohol history is.
 
REVIEW OF SYSTEMS:  A 14-point review of systems was attempted, she was not able
to do that, she was too dyspneic and not feeling as well.
 
PHYSICAL EXAMINATION:
GENERAL:  A 78-year-old female, emaciated, chronically ill, doing poorly.  I saw
her in the ICU, she is getting ready to have a right-sided thoracentesis.
VITAL SIGNS:  Blood pressure is 113/90 on 2 pressors and midodrine, on 40 liters
oxygen high flow, her sats are 95-98%.  Her heart rate is 94, respirations are
24-25.  She is afebrile.  She is 5 feet 3 inches tall, weighs 45 kilograms or 95
pounds, BMI is 16.
HEENT:  Pupils are midpoint and reactive.  She is somewhat jaundiced.  Mucous
membranes are dry.
NECK:  No increase in jugular venous pressure.
CHEST:  Reveals decreased breath sounds, prolonged expiratory phase.  Rhonchi
are noted bilaterally.  She is thin and frail, you can see all of her ribs quite
easily.  Loss of musculature is noted.
CARDIOVASCULAR:  Regular rate and rhythm without murmur, gallop or rub.  Heart
rate is in the 90s.  No S3 is noted.
ABDOMEN:  Soft.  Mild liver edge is felt.  Some minimal ascites is felt.
EXTREMITIES:  Trace edema.  No cyanosis or clubbing.
NEUROLOGIC:  She is grossly intact, but she moves all fours to commands, very
weak.
 
LABORATORY DATA:  Hemoglobin is 7.5, previously was 11.9 with a white count of
6300, MCV was 87, platelets were 91730, normal differential.  Sodium is 133,
potassium is 4.7, carbon dioxide is 31, BUN is 30, creatinine 1.2, calcium is
8.0 and ALT is normal.  Albumin is 2.4.  TSH is pending at 1.73.  The last set
ABGs was about a week ago with a pO2 of 83, pH 7.40, pCO2 is 35, that was on 3
liters and coags were within normal limits.
 
Chest x-ray shows loculated right effusion and right lower lobe infiltrate. 
Heart size is upper limits of normal.  She has some significant effusion, but
there is no shift of the mediastinum to the left on her x-ray.  This is
different from a chest x-ray dated 09/14, which shows small bilateral effusions
and looked much improved at that time.  She may have had a small right lower
lobe infiltrate at that time also.
 
IMPRESSION:
1.  Acute hypoxic respiratory failure, multifactorial.
2.  Large right pleural effusion, part of this is loculated.  Some of this may
be atelectasis and volume loss.
 
 
 
[place] [address]                    CONSULTATION                  
_______________________________________________________________________________
 
Name:       BECHRISTY                 Room:           [address] [place]  
[facility].#:  [id number]      Account #:      [id number]  
Admission:  09/10/19     Attend Phys:    JANNY Rizvi
Discharge:               Date of Birth:  [birth year]  
         Report #: [id number]
                                                                     [id number]  
_______________________________________________________________________________
3.  End-stage liver disease with hepatic cirrhosis and varices and ascites.
4.  Myelofibrosis.
5.  Chronic congestive heart failure.
6.  Multisystem disorder and multisystem failure.
 
PLAN:  The patient right now is just intubation only, but no CPR, no
defibrillation.  She is currently on 2 pressors, will try and get her from not
being intubated.  She is still full code status, so we may have to check her
blood gas in the morning and see where she is at.  I think she did poorly on the
ventilator and we will soon do about helping her out her breathing.  I am not
sure the thoracentesis will help, but I think it is worth to give a try at this
time.  If things will get worse, we may try some BiPAP.  This has been a
40-minute Critical Care consultation; Dr. Mcdonald was dictating for follow up
while she is in the Intensive Care Unit.  Hopefully, she will go to Palliative
Care and Hospice, which would be the best situation for this lady.  Her
daughters have been informed of this.  The one oldest daughter I think is still
holding now hope that she has reversible disease.
 
 
 
 
 
 
 
 
 
 
 
 
 
 
 
 
 
 
 
 
 
 
 
 
 
 
 
<ELECTRONICALLY SIGNED>
                                        By:  Addi Mcdonald MD         
09/18/19     1037
D: 09/17/19 1819_______________________________________
T: 09/18/19 0243Antmontrell Mcdonald MD            /nt

## 2019-09-18 NOTE — NUR
PT IS A/O X4,VSS,TRACING AFIB ON THE MONITOR.PT SWITCHED FROM HEATED HIGH FLOW
NC TO 7L HIGH FLOW NC.LEFT SUBCLAVIAN TRIPLE LUMEN PATENT WITH LEVOPHED AND
DOBUTAMINE INFUSING PER TITRATION ORDERS.TOBIAS SECURE AND PATENT.Q2 HOUR
POSITION COMPLETED.PT LEFT RESTING IN BED WITH CALL LIGHT AND FALL PRECAUTIONS
IN PLACE.FAMILY AT BEDSIDE.WILL CONTINUE TO MONITOR FOR DURATION OF SHIFT.

## 2019-09-18 NOTE — NUR
ICU rounds: Pt changed code status to DNR.  Pt is refusing comfort care
however and pt/pt family continue to be resistant to hospice services.  SW/CM
to continue to follow to assist and support as needed.

## 2019-09-19 VITALS — DIASTOLIC BLOOD PRESSURE: 31 MMHG | SYSTOLIC BLOOD PRESSURE: 79 MMHG

## 2019-09-19 VITALS — SYSTOLIC BLOOD PRESSURE: 77 MMHG | DIASTOLIC BLOOD PRESSURE: 37 MMHG

## 2019-09-19 VITALS — SYSTOLIC BLOOD PRESSURE: 64 MMHG | DIASTOLIC BLOOD PRESSURE: 42 MMHG

## 2019-09-19 VITALS — DIASTOLIC BLOOD PRESSURE: 21 MMHG | SYSTOLIC BLOOD PRESSURE: 97 MMHG

## 2019-09-19 VITALS — SYSTOLIC BLOOD PRESSURE: 83 MMHG | DIASTOLIC BLOOD PRESSURE: 47 MMHG

## 2019-09-19 VITALS — SYSTOLIC BLOOD PRESSURE: 87 MMHG | DIASTOLIC BLOOD PRESSURE: 46 MMHG

## 2019-09-19 VITALS — SYSTOLIC BLOOD PRESSURE: 90 MMHG | DIASTOLIC BLOOD PRESSURE: 55 MMHG

## 2019-09-19 VITALS — DIASTOLIC BLOOD PRESSURE: 54 MMHG | SYSTOLIC BLOOD PRESSURE: 91 MMHG

## 2019-09-19 VITALS — SYSTOLIC BLOOD PRESSURE: 85 MMHG | DIASTOLIC BLOOD PRESSURE: 46 MMHG

## 2019-09-19 VITALS — SYSTOLIC BLOOD PRESSURE: 88 MMHG | DIASTOLIC BLOOD PRESSURE: 51 MMHG

## 2019-09-19 VITALS — SYSTOLIC BLOOD PRESSURE: 91 MMHG | DIASTOLIC BLOOD PRESSURE: 52 MMHG

## 2019-09-19 VITALS — DIASTOLIC BLOOD PRESSURE: 41 MMHG | SYSTOLIC BLOOD PRESSURE: 98 MMHG

## 2019-09-19 VITALS — SYSTOLIC BLOOD PRESSURE: 80 MMHG | DIASTOLIC BLOOD PRESSURE: 36 MMHG

## 2019-09-19 VITALS — DIASTOLIC BLOOD PRESSURE: 31 MMHG | SYSTOLIC BLOOD PRESSURE: 84 MMHG

## 2019-09-19 VITALS — SYSTOLIC BLOOD PRESSURE: 77 MMHG | DIASTOLIC BLOOD PRESSURE: 36 MMHG

## 2019-09-19 VITALS — DIASTOLIC BLOOD PRESSURE: 28 MMHG | SYSTOLIC BLOOD PRESSURE: 88 MMHG

## 2019-09-19 VITALS — DIASTOLIC BLOOD PRESSURE: 46 MMHG | SYSTOLIC BLOOD PRESSURE: 86 MMHG

## 2019-09-19 VITALS — SYSTOLIC BLOOD PRESSURE: 93 MMHG | DIASTOLIC BLOOD PRESSURE: 43 MMHG

## 2019-09-19 VITALS — DIASTOLIC BLOOD PRESSURE: 44 MMHG | SYSTOLIC BLOOD PRESSURE: 77 MMHG

## 2019-09-19 VITALS — DIASTOLIC BLOOD PRESSURE: 43 MMHG | SYSTOLIC BLOOD PRESSURE: 77 MMHG

## 2019-09-19 VITALS — SYSTOLIC BLOOD PRESSURE: 79 MMHG | DIASTOLIC BLOOD PRESSURE: 43 MMHG

## 2019-09-19 VITALS — DIASTOLIC BLOOD PRESSURE: 46 MMHG | SYSTOLIC BLOOD PRESSURE: 94 MMHG

## 2019-09-19 VITALS — SYSTOLIC BLOOD PRESSURE: 95 MMHG | DIASTOLIC BLOOD PRESSURE: 62 MMHG

## 2019-09-19 VITALS — SYSTOLIC BLOOD PRESSURE: 83 MMHG | DIASTOLIC BLOOD PRESSURE: 52 MMHG

## 2019-09-19 VITALS — SYSTOLIC BLOOD PRESSURE: 66 MMHG | DIASTOLIC BLOOD PRESSURE: 38 MMHG

## 2019-09-19 VITALS — SYSTOLIC BLOOD PRESSURE: 79 MMHG | DIASTOLIC BLOOD PRESSURE: 44 MMHG

## 2019-09-19 VITALS — SYSTOLIC BLOOD PRESSURE: 97 MMHG | DIASTOLIC BLOOD PRESSURE: 53 MMHG

## 2019-09-19 VITALS — SYSTOLIC BLOOD PRESSURE: 88 MMHG | DIASTOLIC BLOOD PRESSURE: 49 MMHG

## 2019-09-19 VITALS — SYSTOLIC BLOOD PRESSURE: 76 MMHG | DIASTOLIC BLOOD PRESSURE: 42 MMHG

## 2019-09-19 VITALS — DIASTOLIC BLOOD PRESSURE: 48 MMHG | SYSTOLIC BLOOD PRESSURE: 81 MMHG

## 2019-09-19 VITALS — DIASTOLIC BLOOD PRESSURE: 43 MMHG | SYSTOLIC BLOOD PRESSURE: 93 MMHG

## 2019-09-19 VITALS — SYSTOLIC BLOOD PRESSURE: 86 MMHG | DIASTOLIC BLOOD PRESSURE: 41 MMHG

## 2019-09-19 VITALS — SYSTOLIC BLOOD PRESSURE: 88 MMHG | DIASTOLIC BLOOD PRESSURE: 45 MMHG

## 2019-09-19 VITALS — SYSTOLIC BLOOD PRESSURE: 59 MMHG | DIASTOLIC BLOOD PRESSURE: 30 MMHG

## 2019-09-19 VITALS — DIASTOLIC BLOOD PRESSURE: 51 MMHG | SYSTOLIC BLOOD PRESSURE: 87 MMHG

## 2019-09-19 VITALS — SYSTOLIC BLOOD PRESSURE: 99 MMHG | DIASTOLIC BLOOD PRESSURE: 48 MMHG

## 2019-09-19 VITALS — DIASTOLIC BLOOD PRESSURE: 50 MMHG | SYSTOLIC BLOOD PRESSURE: 91 MMHG

## 2019-09-19 VITALS — SYSTOLIC BLOOD PRESSURE: 95 MMHG | DIASTOLIC BLOOD PRESSURE: 38 MMHG

## 2019-09-19 VITALS — DIASTOLIC BLOOD PRESSURE: 50 MMHG | SYSTOLIC BLOOD PRESSURE: 102 MMHG

## 2019-09-19 VITALS — SYSTOLIC BLOOD PRESSURE: 88 MMHG | DIASTOLIC BLOOD PRESSURE: 35 MMHG

## 2019-09-19 VITALS — DIASTOLIC BLOOD PRESSURE: 48 MMHG | SYSTOLIC BLOOD PRESSURE: 85 MMHG

## 2019-09-19 VITALS — DIASTOLIC BLOOD PRESSURE: 47 MMHG | SYSTOLIC BLOOD PRESSURE: 89 MMHG

## 2019-09-19 VITALS — SYSTOLIC BLOOD PRESSURE: 92 MMHG | DIASTOLIC BLOOD PRESSURE: 57 MMHG

## 2019-09-19 VITALS — SYSTOLIC BLOOD PRESSURE: 92 MMHG | DIASTOLIC BLOOD PRESSURE: 56 MMHG

## 2019-09-19 VITALS — SYSTOLIC BLOOD PRESSURE: 79 MMHG | DIASTOLIC BLOOD PRESSURE: 35 MMHG

## 2019-09-19 VITALS — SYSTOLIC BLOOD PRESSURE: 74 MMHG | DIASTOLIC BLOOD PRESSURE: 43 MMHG

## 2019-09-19 VITALS — SYSTOLIC BLOOD PRESSURE: 92 MMHG | DIASTOLIC BLOOD PRESSURE: 48 MMHG

## 2019-09-19 VITALS — DIASTOLIC BLOOD PRESSURE: 42 MMHG | SYSTOLIC BLOOD PRESSURE: 82 MMHG

## 2019-09-19 VITALS — DIASTOLIC BLOOD PRESSURE: 107 MMHG | SYSTOLIC BLOOD PRESSURE: 126 MMHG

## 2019-09-19 VITALS — DIASTOLIC BLOOD PRESSURE: 37 MMHG | SYSTOLIC BLOOD PRESSURE: 67 MMHG

## 2019-09-19 VITALS — SYSTOLIC BLOOD PRESSURE: 83 MMHG | DIASTOLIC BLOOD PRESSURE: 37 MMHG

## 2019-09-19 VITALS — DIASTOLIC BLOOD PRESSURE: 48 MMHG | SYSTOLIC BLOOD PRESSURE: 84 MMHG

## 2019-09-19 VITALS — SYSTOLIC BLOOD PRESSURE: 78 MMHG | DIASTOLIC BLOOD PRESSURE: 44 MMHG

## 2019-09-19 VITALS — SYSTOLIC BLOOD PRESSURE: 82 MMHG | DIASTOLIC BLOOD PRESSURE: 54 MMHG

## 2019-09-19 VITALS — SYSTOLIC BLOOD PRESSURE: 66 MMHG | DIASTOLIC BLOOD PRESSURE: 35 MMHG

## 2019-09-19 VITALS — DIASTOLIC BLOOD PRESSURE: 50 MMHG | SYSTOLIC BLOOD PRESSURE: 78 MMHG

## 2019-09-19 VITALS — DIASTOLIC BLOOD PRESSURE: 51 MMHG | SYSTOLIC BLOOD PRESSURE: 93 MMHG

## 2019-09-19 VITALS — DIASTOLIC BLOOD PRESSURE: 34 MMHG | SYSTOLIC BLOOD PRESSURE: 100 MMHG

## 2019-09-19 LAB
ABSOLUTE BASOPHILS: 0.2 THOU/UL (ref 0–0.2)
ABSOLUTE EOSINOPHILS: 0 THOU/UL (ref 0–0.7)
ABSOLUTE MONOCYTES: 0.7 THOU/UL (ref 0–1.2)
ANION GAP SERPL CALC-SCNC: 4 MMOL/L (ref 7–16)
BASOPHILS NFR BLD AUTO: 2.5 %
BUN SERPL-MCNC: 30 MG/DL (ref 7–18)
CALCIUM SERPL-MCNC: 7.8 MG/DL (ref 8.5–10.1)
CHLORIDE SERPL-SCNC: 101 MMOL/L (ref 98–107)
CO2 SERPL-SCNC: 30 MMOL/L (ref 21–32)
CREAT SERPL-MCNC: 1.1 MG/DL (ref 0.6–1.3)
EOSINOPHIL NFR BLD: 0.3 %
GLUCOSE SERPL-MCNC: 117 MG/DL (ref 70–99)
GRANULOCYTES NFR BLD MANUAL: 78.5 %
HCT VFR BLD CALC: 23.5 % (ref 37–47)
HGB BLD-MCNC: 7.6 GM/DL (ref 12–15)
LYMPHOCYTES # BLD: 0.9 THOU/UL (ref 0.8–5.3)
LYMPHOCYTES NFR BLD AUTO: 10.5 %
MCH RBC QN AUTO: 28.3 PG (ref 26–34)
MCHC RBC AUTO-ENTMCNC: 32.4 G/DL (ref 28–37)
MCV RBC: 87.4 FL (ref 80–100)
MONOCYTES NFR BLD: 8.2 %
MPV: 7.8 FL. (ref 7.2–11.1)
NEUTROPHILS # BLD: 6.4 THOU/UL (ref 1.6–8.1)
NUCLEATED RBCS: 0 /100WBC
PLATELET COUNT*: 93 THOU/UL (ref 150–400)
POTASSIUM SERPL-SCNC: 5 MMOL/L (ref 3.5–5.1)
RBC # BLD AUTO: 2.69 MIL/UL (ref 4.2–5)
RDW-CV: 21.3 % (ref 10.5–14.5)
SODIUM SERPL-SCNC: 135 MMOL/L (ref 136–145)
WBC # BLD AUTO: 8.1 THOU/UL (ref 4–11)

## 2019-09-19 NOTE — NUR
PT IS A/OX4,VSS AT BASELINE.TRACING AFIB ON THE MONITOR.PT TITRATED DOWN TO 6L
FROM 8L O2 HIGH FLOW NC THROUGHOUT SHIFT.LEFT SUBCLAVIAN PATENT AND SALINE
LOCKED.PT TITRATED OFF OF DOBUTAMINE AND LEVOPHED THIS SHIFT.TOBIAS SECURE AND
PATENT.PT HAS HAD SEVERAL SOFT BOWEL MOVEMENTS THROUGHTOUT THE SHIFT.PT SAT UP
IN RECLINER THIS AM.PT IS CALM AND COOPERATIVE WITH NO C/O PAIN.PT INFORMED OF
PLAN OF CARE AND COMMUNICATES UNDERSTANDING.PT LEFT RESTING IN BED WTIH CALL
LIGHT AND FALL PRECAUTIONS IN PLACE.WILL CONTINUE TO MONITOR FOR DURATION OF
SHIFT.

## 2019-09-19 NOTE — NUR
ICU rounds: Pt now on 8L O2 but still high flow. Cristian. Up with 1.  Pt
continues to deny comfort care; possible for pt to be able to move from ICU
soon.

## 2019-09-20 VITALS — DIASTOLIC BLOOD PRESSURE: 50 MMHG | SYSTOLIC BLOOD PRESSURE: 83 MMHG

## 2019-09-20 VITALS — SYSTOLIC BLOOD PRESSURE: 88 MMHG | DIASTOLIC BLOOD PRESSURE: 47 MMHG

## 2019-09-20 VITALS — DIASTOLIC BLOOD PRESSURE: 49 MMHG | SYSTOLIC BLOOD PRESSURE: 79 MMHG

## 2019-09-20 VITALS — DIASTOLIC BLOOD PRESSURE: 47 MMHG | SYSTOLIC BLOOD PRESSURE: 79 MMHG

## 2019-09-20 VITALS — SYSTOLIC BLOOD PRESSURE: 81 MMHG | DIASTOLIC BLOOD PRESSURE: 42 MMHG

## 2019-09-20 VITALS — DIASTOLIC BLOOD PRESSURE: 46 MMHG | SYSTOLIC BLOOD PRESSURE: 82 MMHG

## 2019-09-20 VITALS — DIASTOLIC BLOOD PRESSURE: 47 MMHG | SYSTOLIC BLOOD PRESSURE: 84 MMHG

## 2019-09-20 VITALS — DIASTOLIC BLOOD PRESSURE: 28 MMHG | SYSTOLIC BLOOD PRESSURE: 83 MMHG

## 2019-09-20 VITALS — SYSTOLIC BLOOD PRESSURE: 82 MMHG | DIASTOLIC BLOOD PRESSURE: 39 MMHG

## 2019-09-20 VITALS — DIASTOLIC BLOOD PRESSURE: 39 MMHG | SYSTOLIC BLOOD PRESSURE: 87 MMHG

## 2019-09-20 VITALS — DIASTOLIC BLOOD PRESSURE: 42 MMHG | SYSTOLIC BLOOD PRESSURE: 92 MMHG

## 2019-09-20 VITALS — DIASTOLIC BLOOD PRESSURE: 43 MMHG | SYSTOLIC BLOOD PRESSURE: 85 MMHG

## 2019-09-20 VITALS — SYSTOLIC BLOOD PRESSURE: 80 MMHG | DIASTOLIC BLOOD PRESSURE: 46 MMHG

## 2019-09-20 VITALS — SYSTOLIC BLOOD PRESSURE: 89 MMHG | DIASTOLIC BLOOD PRESSURE: 50 MMHG

## 2019-09-20 VITALS — DIASTOLIC BLOOD PRESSURE: 40 MMHG | SYSTOLIC BLOOD PRESSURE: 81 MMHG

## 2019-09-20 VITALS — DIASTOLIC BLOOD PRESSURE: 33 MMHG | SYSTOLIC BLOOD PRESSURE: 69 MMHG

## 2019-09-20 VITALS — SYSTOLIC BLOOD PRESSURE: 80 MMHG | DIASTOLIC BLOOD PRESSURE: 43 MMHG

## 2019-09-20 VITALS — DIASTOLIC BLOOD PRESSURE: 50 MMHG | SYSTOLIC BLOOD PRESSURE: 82 MMHG

## 2019-09-20 VITALS — DIASTOLIC BLOOD PRESSURE: 38 MMHG | SYSTOLIC BLOOD PRESSURE: 73 MMHG

## 2019-09-20 VITALS — SYSTOLIC BLOOD PRESSURE: 78 MMHG | DIASTOLIC BLOOD PRESSURE: 43 MMHG

## 2019-09-20 VITALS — SYSTOLIC BLOOD PRESSURE: 76 MMHG | DIASTOLIC BLOOD PRESSURE: 41 MMHG

## 2019-09-20 VITALS — DIASTOLIC BLOOD PRESSURE: 56 MMHG | SYSTOLIC BLOOD PRESSURE: 93 MMHG

## 2019-09-20 VITALS — DIASTOLIC BLOOD PRESSURE: 44 MMHG | SYSTOLIC BLOOD PRESSURE: 75 MMHG

## 2019-09-20 VITALS — SYSTOLIC BLOOD PRESSURE: 76 MMHG | DIASTOLIC BLOOD PRESSURE: 39 MMHG

## 2019-09-20 VITALS — DIASTOLIC BLOOD PRESSURE: 42 MMHG | SYSTOLIC BLOOD PRESSURE: 75 MMHG

## 2019-09-20 VITALS — DIASTOLIC BLOOD PRESSURE: 45 MMHG | SYSTOLIC BLOOD PRESSURE: 79 MMHG

## 2019-09-20 VITALS — SYSTOLIC BLOOD PRESSURE: 83 MMHG | DIASTOLIC BLOOD PRESSURE: 54 MMHG

## 2019-09-20 VITALS — DIASTOLIC BLOOD PRESSURE: 45 MMHG | SYSTOLIC BLOOD PRESSURE: 83 MMHG

## 2019-09-20 LAB — SOURCE: (no result)

## 2019-09-20 NOTE — NUR
WOUND CARE NOTE:  REASSESSMENT OF WOUNDS TO THE LEFT HIP.
 
WOUNDS HAVE HEALED.  NEW EPITHELIUM COVERING ENTIRETY OF BOTH WOUND BEDS.
 
WILL SIGN OFF, PLEASE RECONSULT IF NEEDED.
 
RECOMMEND
BARRIER OINTMENT BID AND PRN
CONTINUE TO OFFLOAD AREA
ENCOURAGE GOOD NUTRTION/HYDRATION.

## 2019-09-20 NOTE — NUR
ICU rounds: Pt down to 6L, off pressors and levo; possible down grade of
status in question; pt nurse reported pt seems to present/feel better today.

## 2019-09-20 NOTE — NUR
PATIENT TAKING PO WELL FAMILY AT BEDSIDE.  REMAINS AFIB RATE CONTROLLED.
DENIES SOA OR PAIN FREQUENT BMS RECEIVING HOME REMEDIES PER SON.

## 2019-09-20 NOTE — CON
[address]  [address]                    CONSULTATION                  
_______________________________________________________________________________
 
Name:       CHRISTY LR                 Room:           [address] [place]  
..#:  [id number]      Account #:      [id number]  
Admission:  09/10/19     Attend Phys:    JANNY Rizvi
Discharge:               Date of Birth:  [birth year]  
         Report #: [id number]
                                                                     [id number]  
_______________________________________________________________________________
THIS REPORT FOR:  //name//                      
 
CC: Janice Hagen
 
DATE OF SERVICE:  09/19/2019
 
 
INFECTIOUS DISEASE CONSULTATION
 
ATTENDING PHYSICIAN:  Dr. Hagen.
 
REASON FOR EVALUATION:  Complicated pleural effusion consistent with exudate in
the setting of chronic respiratory failure and pneumonitis.
 
HISTORY OF PRESENT ILLNESS:  Chart reviewed, the patient examined.  This is a
78-year-old with extensive medical history that is felt to be myeloproliferative
disorder, also end-stage liver disease, cirrhosis, has been hospitalized at
least 4 times this year, presented with progressive dyspnea and felt to have
generalized overall fluid retention, has been associated with worsening
nutritional status and renal failure.  Due to the complex nature of illness and
the severity, has been in the ICU for a number of days.  Did undergo a
thoracentesis on 09/17/2019, drained 2200 mL of dark red bloody effusion, had a
total white count of 3750, 96% polys, pH was 7.5, glucose was 97, protein was
4.1.  Cultures are pending.  She has been empirically treated with ceftriaxone. 
She remains quite chronically ill.  She is accompanied by her family.  She is
lying in left lateral decubitus position.  Per their opinion, appetite has been
reasonable.  Does have ongoing issues with dyspnea, she has had 3 liters, nasal
cannula being maintained.  She has been on pressor support, although it has been
weaned from 30 down to 4 with the norepinephrine.
 
ALLERGIES:  PENICILLIN, SULFA, and CODEINE.
 
CURRENT MEDICATIONS:  Include hydrocortisone, norepinephrine, ceftriaxone,
midodrine, cholecalciferol, levothyroxine, and pantoprazole.
 
PAST MEDICAL HISTORY:  As described above with a myeloproliferative disorder,
cardiomyopathy, congestive heart failure, end-stage liver disease, and
hypothyroidism.
 
SOCIAL HISTORY:  Nonsmoker.  No ethanol.  No illicit drug use.
 
FAMILY HISTORY:  Noncontributory.
 
REVIEW OF SYSTEMS:  Somewhat limited due to her lethargy and decreased
responsiveness.
 
 
 
[place] [address]                    CONSULTATION                  
_______________________________________________________________________________
 
Name:       CHRISTY LR MONCHO                 Room:           [address]#:  [id number]      Account #:      [id number]  
Admission:  09/10/19     Attend Phys:    JANNY Rizvi
Discharge:               Date of Birth:  [birth year]  
         Report #: [id number]
                                                                     [id number]  
_______________________________________________________________________________
 
PHYSICAL EXAMINATION:
VITAL SIGNS:  Temperature 97.1, pulse 101, respirations 21, and blood pressure
70-88/in low 50s.
GENERAL:  She appears chronically ill, undernourished.  Again noted in the left
lateral decubitus position.  She has got nasal cannula oxygen in place.
HEENT:  Normocephalic.
NECK:  Supple.
LUNGS:  Scattered coarse breath sounds.
HEART:  Regular.  Borderline tachycardic.
ABDOMEN:  Soft.  There are no apparent peritoneal signs.
GENITOURINARY AND RECTAL:  Deferred.
 
LABORATORY DATA:  Pathology reported on the fluid showed negative for malignant
cells.  Prealbumin of 8.2.  Electrolytes:  Sodium 135, potassium 4.0, chloride
101, bicarbonate is 30, anion gap of 4, BUN and creatinine 30 and 1.1.  CBC: 
White count of 8.1, H and H 7.6 and 23.5, and platelets of 93.  Chest x-ray,
marked cardiomegaly, moderate middle and right lower lobe atelectasis and
pneumonia, small right pleural effusion.  Pleural fluid culture pending.  Gram
stain showed no organisms, no white cells, pH as described above.  Fluid 7.5.
 
ASSESSMENT:  Chronic respiratory failure with complicated pneumonitis with
effusion post-thoracentesis.  I think it is reasonable to broaden antimicrobial
coverage pending the results of the culture.  She is quite tenuous at this
point.  It is difficult to ascertain given the severe disease burden, likely it
is multifactorial etiology at this point and appear to be loculated over in need
of any sort of procedural intervention.  Did discuss with the family.  Prognosis
appears guarded.
 
 
 
 
 
 
 
 
 
 
 
 
 
 
 
 
<ELECTRONICALLY SIGNED>
                                        By:  William Cramer MD           
09/20/19     0704
D: 09/19/19 1538_______________________________________
T: 09/19/19 2231Jocoleen Cramer MD              /nt

## 2019-09-20 NOTE — NUR
OFF LEVOPHED THROUGH THE NIGHT, LOWEST MAP 43, BUT COMES UP ON ITS OWN, MAP
STAYS 50s-60s. AFIB ON THE MONITOR, DENIES PAIN. Q2 TURNS FOR SKIN INTEGRITY.
CALL LIGHT WITHIN REACH.

## 2019-09-21 VITALS — DIASTOLIC BLOOD PRESSURE: 50 MMHG | SYSTOLIC BLOOD PRESSURE: 101 MMHG

## 2019-09-21 VITALS — DIASTOLIC BLOOD PRESSURE: 79 MMHG | SYSTOLIC BLOOD PRESSURE: 97 MMHG

## 2019-09-21 VITALS — SYSTOLIC BLOOD PRESSURE: 83 MMHG | DIASTOLIC BLOOD PRESSURE: 47 MMHG

## 2019-09-21 VITALS — SYSTOLIC BLOOD PRESSURE: 85 MMHG | DIASTOLIC BLOOD PRESSURE: 52 MMHG

## 2019-09-21 VITALS — SYSTOLIC BLOOD PRESSURE: 99 MMHG | DIASTOLIC BLOOD PRESSURE: 42 MMHG

## 2019-09-21 VITALS — DIASTOLIC BLOOD PRESSURE: 55 MMHG | SYSTOLIC BLOOD PRESSURE: 92 MMHG

## 2019-09-21 VITALS — SYSTOLIC BLOOD PRESSURE: 85 MMHG | DIASTOLIC BLOOD PRESSURE: 55 MMHG

## 2019-09-21 VITALS — DIASTOLIC BLOOD PRESSURE: 45 MMHG | SYSTOLIC BLOOD PRESSURE: 83 MMHG

## 2019-09-21 VITALS — SYSTOLIC BLOOD PRESSURE: 95 MMHG | DIASTOLIC BLOOD PRESSURE: 47 MMHG

## 2019-09-21 VITALS — SYSTOLIC BLOOD PRESSURE: 89 MMHG | DIASTOLIC BLOOD PRESSURE: 43 MMHG

## 2019-09-21 VITALS — SYSTOLIC BLOOD PRESSURE: 90 MMHG | DIASTOLIC BLOOD PRESSURE: 53 MMHG

## 2019-09-21 VITALS — DIASTOLIC BLOOD PRESSURE: 46 MMHG | SYSTOLIC BLOOD PRESSURE: 93 MMHG

## 2019-09-21 VITALS — SYSTOLIC BLOOD PRESSURE: 91 MMHG | DIASTOLIC BLOOD PRESSURE: 49 MMHG

## 2019-09-21 VITALS — DIASTOLIC BLOOD PRESSURE: 53 MMHG | SYSTOLIC BLOOD PRESSURE: 91 MMHG

## 2019-09-21 VITALS — DIASTOLIC BLOOD PRESSURE: 42 MMHG | SYSTOLIC BLOOD PRESSURE: 77 MMHG

## 2019-09-21 VITALS — DIASTOLIC BLOOD PRESSURE: 53 MMHG | SYSTOLIC BLOOD PRESSURE: 86 MMHG

## 2019-09-21 VITALS — DIASTOLIC BLOOD PRESSURE: 59 MMHG | SYSTOLIC BLOOD PRESSURE: 92 MMHG

## 2019-09-21 VITALS — SYSTOLIC BLOOD PRESSURE: 98 MMHG | DIASTOLIC BLOOD PRESSURE: 54 MMHG

## 2019-09-21 VITALS — SYSTOLIC BLOOD PRESSURE: 85 MMHG | DIASTOLIC BLOOD PRESSURE: 51 MMHG

## 2019-09-21 VITALS — DIASTOLIC BLOOD PRESSURE: 43 MMHG | SYSTOLIC BLOOD PRESSURE: 83 MMHG

## 2019-09-21 VITALS — SYSTOLIC BLOOD PRESSURE: 84 MMHG | DIASTOLIC BLOOD PRESSURE: 61 MMHG

## 2019-09-21 VITALS — DIASTOLIC BLOOD PRESSURE: 52 MMHG | SYSTOLIC BLOOD PRESSURE: 89 MMHG

## 2019-09-21 VITALS — SYSTOLIC BLOOD PRESSURE: 95 MMHG | DIASTOLIC BLOOD PRESSURE: 62 MMHG

## 2019-09-21 VITALS — SYSTOLIC BLOOD PRESSURE: 89 MMHG | DIASTOLIC BLOOD PRESSURE: 56 MMHG

## 2019-09-21 VITALS — SYSTOLIC BLOOD PRESSURE: 99 MMHG | DIASTOLIC BLOOD PRESSURE: 49 MMHG

## 2019-09-21 VITALS — SYSTOLIC BLOOD PRESSURE: 98 MMHG | DIASTOLIC BLOOD PRESSURE: 59 MMHG

## 2019-09-21 LAB
ABSOLUTE BASOPHILS: 0.1 THOU/UL (ref 0–0.2)
ABSOLUTE EOSINOPHILS: 0 THOU/UL (ref 0–0.7)
ABSOLUTE MONOCYTES: 0.5 THOU/UL (ref 0–1.2)
AMMONIA PLAS-SCNC: 22 UMOL/L (ref 11–32)
ANION GAP SERPL CALC-SCNC: 7 MMOL/L (ref 7–16)
BASOPHILS NFR BLD AUTO: 0.9 %
BUN SERPL-MCNC: 42 MG/DL (ref 7–18)
CALCIUM SERPL-MCNC: 8.5 MG/DL (ref 8.5–10.1)
CHLORIDE SERPL-SCNC: 102 MMOL/L (ref 98–107)
CO2 SERPL-SCNC: 27 MMOL/L (ref 21–32)
CREAT SERPL-MCNC: 1.1 MG/DL (ref 0.6–1.3)
EOSINOPHIL NFR BLD: 0.2 %
GLUCOSE SERPL-MCNC: 130 MG/DL (ref 70–99)
GRANULOCYTES NFR BLD MANUAL: 87.9 %
HCT VFR BLD CALC: 22.1 % (ref 37–47)
HGB BLD-MCNC: 7.2 GM/DL (ref 12–15)
LYMPHOCYTES # BLD: 0.8 THOU/UL (ref 0.8–5.3)
LYMPHOCYTES NFR BLD AUTO: 6.9 %
MCH RBC QN AUTO: 28.8 PG (ref 26–34)
MCHC RBC AUTO-ENTMCNC: 32.6 G/DL (ref 28–37)
MCV RBC: 88.4 FL (ref 80–100)
MONOCYTES NFR BLD: 4.1 %
MPV: 9 FL. (ref 7.2–11.1)
NEUTROPHILS # BLD: 9.6 THOU/UL (ref 1.6–8.1)
NUCLEATED RBCS: 0 /100WBC
PLATELET COUNT*: 83 THOU/UL (ref 150–400)
POTASSIUM SERPL-SCNC: 4.7 MMOL/L (ref 3.5–5.1)
RBC # BLD AUTO: 2.5 MIL/UL (ref 4.2–5)
RDW-CV: 21.8 % (ref 10.5–14.5)
SODIUM SERPL-SCNC: 136 MMOL/L (ref 136–145)
WBC # BLD AUTO: 10.9 THOU/UL (ref 4–11)

## 2019-09-21 PROCEDURE — 30233N1 TRANSFUSION OF NONAUTOLOGOUS RED BLOOD CELLS INTO PERIPHERAL VEIN, PERCUTANEOUS APPROACH: ICD-10-PCS | Performed by: INTERNAL MEDICINE

## 2019-09-21 NOTE — NUR
Pt rested well overnight.  Family at bedside during early part of shift, then
daughter stayed at bedside overnight.  Pt up to BSC multiple times for soft
BMs.  VSS, BP 80s-90s/40s-50s.  Remains off levophed/dobutamine gtts.  Will
continue to monitor.

## 2019-09-21 NOTE — NUR
PATIENT PROGRESSING TOWARDS GOALS. PT IS NOW TELEMETRY STATUS. REMAINS OFF OF
LEVOPHED DRIP. OXYGEN TITRATED DOWN TO 2LPM. GOOD APPETITE. UP TO BSC MULTIPLE
TIMES. REFUSED PHYSICAL THERAPY.AWAITING UNTI OF BLOOD TO TRANSFUSE AS
ORDERED.MULTIPLE FAMILY MEMBERS HAVE VISITED.

## 2019-09-22 VITALS — DIASTOLIC BLOOD PRESSURE: 58 MMHG | SYSTOLIC BLOOD PRESSURE: 92 MMHG

## 2019-09-22 VITALS — DIASTOLIC BLOOD PRESSURE: 57 MMHG | SYSTOLIC BLOOD PRESSURE: 92 MMHG

## 2019-09-22 VITALS — DIASTOLIC BLOOD PRESSURE: 56 MMHG | SYSTOLIC BLOOD PRESSURE: 95 MMHG

## 2019-09-22 VITALS — SYSTOLIC BLOOD PRESSURE: 103 MMHG | DIASTOLIC BLOOD PRESSURE: 41 MMHG

## 2019-09-22 VITALS — SYSTOLIC BLOOD PRESSURE: 96 MMHG | DIASTOLIC BLOOD PRESSURE: 46 MMHG

## 2019-09-22 VITALS — DIASTOLIC BLOOD PRESSURE: 56 MMHG | SYSTOLIC BLOOD PRESSURE: 93 MMHG

## 2019-09-22 LAB
ABSOLUTE BASOPHILS: 0.1 THOU/UL (ref 0–0.2)
ABSOLUTE MONOCYTES: 0.3 THOU/UL (ref 0–1.2)
ANISOCYTOSIS BLD QL SMEAR: (no result)
BASOPHILS NFR BLD AUTO: 1 %
GRANULOCYTES NFR BLD MANUAL: 90 %
HCT VFR BLD CALC: 25.9 % (ref 37–47)
HGB BLD-MCNC: 8.1 GM/DL (ref 12–15)
IRON SERPL-MCNC: 73 UG/DL (ref 50–175)
LYMPHOCYTES # BLD: 0.7 THOU/UL (ref 0.8–5.3)
LYMPHOCYTES NFR BLD AUTO: 5 %
MCH RBC QN AUTO: 28.1 PG (ref 26–34)
MCHC RBC AUTO-ENTMCNC: 31.3 G/DL (ref 28–37)
MCV RBC: 89.7 FL (ref 80–100)
METAMYELOCYTES NFR BLD: 1 %
MICROCYTES: (no result)
MONOCYTES NFR BLD: 2 %
MPV: 8.7 FL. (ref 7.2–11.1)
NEUTROPHILS # BLD: 13.4 THOU/UL (ref 1.6–8.1)
NEUTS BAND NFR BLD: 1 %
NUCLEATED RBCS: 0 /100WBC
OVALOCYTES BLD QL SMEAR: (no result)
PLATELET # BLD EST: (no result) 10*3/UL
PLATELET COUNT*: 111 THOU/UL (ref 150–400)
RBC # BLD AUTO: 2.88 MIL/UL (ref 4.2–5)
RDW-CV: 20.9 % (ref 10.5–14.5)
SAO2 % BLD FROM PO2: 37 % (ref 20–39)
WBC # BLD AUTO: 14.6 THOU/UL (ref 4–11)

## 2019-09-22 NOTE — NUR
ASSUMED CARE OF PATIENT AT 1900. FAMILY AT BEDSIDE DURING WHOLE SHIFT.
ASSESSMENT AS CHARTED. VSS. PATIENT RECEIVED 1 UNIT PRBC AT START OF SHIFT, NO
ADVERSE REACTIONS TO TRANSFUSION. MORNING LABWORK SHOWS IMPROVEMENT. PATIENT'S
DAUGHTER ASSISTING PATIENT TO COMODE, 4 LARGE BOWEL MOVEMENTS THIS SHIFT.
PROGRESSING TOWARDS GOALS.
 
AT 2100 MED PASS, ADMINISTERED ENOXAPARIN PER PHYSICIAN ORDER. AFTER
ADMINISTRATION, FAMILY MEMBERS EXPRESSED DISAPPROVAL FOR MEDICATION
ADMINISTRATION. NO DESIRE FOR REFUSAL WAS EXPRESSED PRIOR TO ADMINISTRATION.
PATIENT AND FAMILY DO NOT WANT THE PATIENT TO RECEIVE ENOXAPARIN, OR ANY BLOOD
THINNERS, WHILE SHE REMAINS IN HOSPITAL.
 
PATIENT REMAINS OFF PRESSORS. STILL TELE STATUS.

## 2019-09-22 NOTE — NUR
REPORT RECIEVED FROM DANTE. PT TRANSFERED TO ROOM 225. I HAVE REVIEWED THE
DOCUMENTATION OF DANTE VILLALOBOS AND AGREE WITH HER ASSESSMENT. FAMILY REMAINED AT
BEDSIDE. NO REPORTS OF PAIN OR NAUSEA. PT HAD LOOSE BOWEL MOVEMENT. PT MOVED
TO ROOM 230 TO ACCOMADATE NUMBER OF VISITORS. CENTRAL LINE PATENT. WILL
CONTINUE WITH PLAN OF CARE.

## 2019-09-22 NOTE — NUR
DR SOMMERS SAW PATIENT AND SPOKE WITH FAMILY .PLAN IS FOR TRANSFER OUT OF ICU
TODAY. FAMILY IS AWARE.

## 2019-09-23 VITALS — SYSTOLIC BLOOD PRESSURE: 103 MMHG | DIASTOLIC BLOOD PRESSURE: 51 MMHG

## 2019-09-23 VITALS — DIASTOLIC BLOOD PRESSURE: 32 MMHG | SYSTOLIC BLOOD PRESSURE: 87 MMHG

## 2019-09-23 VITALS — DIASTOLIC BLOOD PRESSURE: 65 MMHG | SYSTOLIC BLOOD PRESSURE: 142 MMHG

## 2019-09-23 VITALS — DIASTOLIC BLOOD PRESSURE: 44 MMHG | SYSTOLIC BLOOD PRESSURE: 88 MMHG

## 2019-09-23 NOTE — PATH
[address]  [address]                    PATHOLOGY RPT PROCEDURE       
_______________________________________________________________________________
 
Name:       CHRISTY LR MONCHO                 Room:           [address] [place]  
[facility]#:  [id number]      Account #:      [id number]  
Admission:  09/10/19     Date of Birth:  [birth year]  
Discharge:                             Report #:    [id number]
                                                         Path Case #: [id number]
_______________________________________________________________________________
Note
[facility] Accession Number: [id number]
   TESTS               RESULT  FLAG  UNITS    REF RANGE  LAB
------------------------------------------------------------
   Clinician Provided Cytology Information
   No. of containers..01 Other (Miscellaneous)
Source:                                                   01
   PLEURAL FLUID
DIAGNOSIS:                                                02
   PLEURAL FLUID
   NEGATIVE FOR DIAGNOSTIC MALIGNANT CELLS.
   FEW MESOTHELIAL CELLS AND BENIGN-APPEARING INFLAMMATORY CELLS IN BLOODY
   BACKGROUND.
   SCANT CELLULARITY.
Signed out by:                                            02
   Mik Martin MD, Pathologist
   NPI- [id number]
Performed by:                                             01
   Caleb Fox, Cytotechnologist ([facility])
Gross description:                                        01
   32 ML, RED, BLOODY
   /LCS  12/31/1840  0000 Local
 
------------------------------------------------------------
    FLAG LEGEND:
    L-Low Normal,H-High Normal,LL-Alert Low,HH-Alert High
    <-Panic Low,>-Panic High,A-Abnormal,AA-Critical Abnormal
------------------------------------------------------------
 
Performed at:
01 [address]
   [place]  [address]
   Dez Lopez MD, Phone: [phone]
[address]  [address]
   Mik Martin MD, Phone: [phone]
Specimen Comment: A duplicate report has been generated due to demographic
updates.
***Performed at:  01
   [address], [place], [place]  [id number]
   MD Dez Lopez MD Phone:  [phone]

## 2019-09-23 NOTE — NUR
ASSUMED CARE OF PATIENT THIS AM AT 0730.  PATIENT IS ALERT AND ORIENTED X 4.
SHE DENIES PAIN THIS AM.  PATIENT ASSISTED WITH ADLS THROUGHOUT THE DAY.
FAMILY IS IN AT THE BEDSIDE AND ARE VERY INVOLVED IN PATIENT'S CARE.  TELE
SHOWS AFIB WITH VENTRICULAR ECTOPIES TO SR.  WILL CONTINUE TO MONITOR.

## 2019-09-23 NOTE — NUR
ASSUMED CARE OF PT AT 1900. PT IS ALERT AND ORIENTED. VSS. PERRLA. NO
COMPLAINTS OF PAIN. PT HAS A TOBIAS IN PLACE. PT IS IN A FIB ON THE TELEMETRY.
PT IS RESTING COMFORTABLY IN BED. RESPIRATIONS ARE EVEN AND NONLABORED. WILL
CONTINUE TO MONITOR PT.

## 2019-09-23 NOTE — NUR
CONTINUE TO FOLLOW, MET WITH PT AND FAMILY INCLUDING NICHELLE/CHRISTY WHO IS DPOA.
DISCUSSED DC PLAN.  THEY STILL PLAN TO TAKE PT HOME AND CONTINUE AS THEY HAVE.
 2 CHILDREN ARE HER PD CAREGIVERS THRU MEDICAID.  THEY ARE NOT INTERESTED IN
PALLIATIVE CARE OR HOSPICE AT THIS TIME.  DISCUSSSED HH, PT HAS BEEN ON
SERVICE WITH [facility] HOME IN PAST.  SET UP FOR REP FROM [place] TO MEET
WITH THEM, SHE SAW THEM AND ALL IN AGREEMENT WITH HH  ANTICIPATE DC SOON. WILL
FOLLOW

## 2019-09-24 VITALS — SYSTOLIC BLOOD PRESSURE: 96 MMHG | DIASTOLIC BLOOD PRESSURE: 60 MMHG

## 2019-09-24 VITALS — SYSTOLIC BLOOD PRESSURE: 110 MMHG | DIASTOLIC BLOOD PRESSURE: 74 MMHG

## 2019-09-24 VITALS — DIASTOLIC BLOOD PRESSURE: 47 MMHG | SYSTOLIC BLOOD PRESSURE: 91 MMHG

## 2019-09-24 LAB
ABSOLUTE BASOPHILS: 0.1 THOU/UL (ref 0–0.2)
ABSOLUTE EOSINOPHILS: 0.1 THOU/UL (ref 0–0.7)
ABSOLUTE MONOCYTES: 0.5 THOU/UL (ref 0–1.2)
ANION GAP SERPL CALC-SCNC: 6 MMOL/L (ref 7–16)
BASOPHILS NFR BLD AUTO: 1.2 %
BUN SERPL-MCNC: 42 MG/DL (ref 7–18)
CALCIUM SERPL-MCNC: 8.2 MG/DL (ref 8.5–10.1)
CHLORIDE SERPL-SCNC: 109 MMOL/L (ref 98–107)
CO2 SERPL-SCNC: 26 MMOL/L (ref 21–32)
CREAT SERPL-MCNC: 1.2 MG/DL (ref 0.6–1.3)
EOSINOPHIL NFR BLD: 0.5 %
GLUCOSE SERPL-MCNC: 109 MG/DL (ref 70–99)
GRANULOCYTES NFR BLD MANUAL: 85.5 %
HCT VFR BLD CALC: 25.7 % (ref 37–47)
HGB BLD-MCNC: 8 GM/DL (ref 12–15)
LYMPHOCYTES # BLD: 1 THOU/UL (ref 0.8–5.3)
LYMPHOCYTES NFR BLD AUTO: 8.4 %
MCH RBC QN AUTO: 28.4 PG (ref 26–34)
MCHC RBC AUTO-ENTMCNC: 31 G/DL (ref 28–37)
MCV RBC: 91.7 FL (ref 80–100)
MONOCYTES NFR BLD: 4.4 %
MPV: 8.9 FL. (ref 7.2–11.1)
NEUTROPHILS # BLD: 10.7 THOU/UL (ref 1.6–8.1)
NUCLEATED RBCS: 1 /100WBC
PLATELET COUNT*: 123 THOU/UL (ref 150–400)
POTASSIUM SERPL-SCNC: 5 MMOL/L (ref 3.5–5.1)
RBC # BLD AUTO: 2.81 MIL/UL (ref 4.2–5)
RDW-CV: 22.1 % (ref 10.5–14.5)
SODIUM SERPL-SCNC: 141 MMOL/L (ref 136–145)
WBC # BLD AUTO: 12.5 THOU/UL (ref 4–11)

## 2019-09-24 NOTE — NUR
NOTE HAD BEEN DELETED IN PROCESS INTERVENTIONS.  THE PT WAS SEEN PT BY ON
9/23/19. TO VIEW ACTUAL NOTE DETAILS, THE NOTE CAN BE VIEWED UNDER
PCI/ASSESSMENT.
 
DERRICK VIZCAINO, PT

## 2019-09-24 NOTE — NUR
ASSUMED CARE OF PT AT 1900. PT IS ALERT AND ORIENTED. VSS. PERRLA. PT IS UP
WITH 1 ASSIST AND IS VERY WEAK. PT HAS A TOBIAS IN PLACE. PT IS IN A FIB ON THE
TELEMETRY. PT IS RESTING COMFORTABLY IN BED. RESPIRATIONS ARE EVEN AND
NONLABORED. WILL CONTINUE TO MONITOR PT.

## 2019-09-24 NOTE — NUR
ORDERS RECEIVED FOR GUIDO TO D/C POST STAT US IF NO THORACENTESIS NEEDED- US
COMPLETED WITH RESULTS REVIEWED WITH GUIDO GIL TO CONTINUE WITH D/C
NOTED- TOBIAS D/C'D PRIOR TO D/C- PICTURES OBTAINED OF COCCYX AND LEFT HIP AND
PLACED IN CHART FOR VIEWING PRIOR TO D/C- LEFT TRIPLE LUMEN SUB CLAV IV D/C'D
WITH PRESSURE APPLIED X5 MINS AND GAUZE/TRANSPARENT DRESSING APPLIED- D/C
EDUCATION/TEACHING/NEEDED FOLLOW UP'S COMMUNICATED TO DAUGHTER PRIOR TO D/C
WITH ALL QUESTIONS AND CONCERNS ADDRESSED PRIOR TO D/C- WRITTEN EDUCATION
ALONG WITH SCRIPTS PROVIDED TO DAUGHTER PRIOR TO D/C- BELONGINGS PACKED AND
ACCOUNTED FOR PER PT CHILDREN PRIOR TO D/C- PT DRESSED AND AWAITING TO D/C AT
THIS TIME- ALL NEEDS MET AT THIS TIME-WCTM

## 2019-09-24 NOTE — NUR
CONTINUE TO FOLLOW, POSSIBLE DC TODAY PENDING NEED FOR THORACENTESIS.  TO GO
HOME WITH [facility] HH.  UPDATED DTR/CHRISTY. CHECKED WITH [facility],
THEY CAN ADMINISTER EPOGEN SHOTS IF ORDERED.  OVERNIGHT SAT ORDERED ALSO, WILL
DO IN HOSPITAL IF NOT DC'D TODAY OR CAN ARRANGE AT HOME IF NEEDED.  AWAIT TEST
TODAY AND DECISION ABOUT DC.

## 2019-09-24 NOTE — NUR
ASSUMED CARE OF PT THIS AM AROUND 0715- CARDIAC MONITOR IN PLACE AS ORDERED,
TRACING A-FIB/RATE CONTROLLED- UPON ASSESSMENT PT NOTED TO BE RESTING IN BED,
 AT SIDE- PT A&O X4-TOBIAS IN PLACE D/D ANDRIY URINE, CONTINENT OF
BOWEL-ASSIST X1 WITH TRANSFERS- LCTA/DIMINSHED IN BASES, DYSPNEA NOTED-BP
NOTED LOW WITH MIDODRINE GIVEN AS PRESCIBED THIS AM- O2 SAT 99% ON 2L VIA NC-
ABD SOFT/ROUND/NON-TENDER, BS X 4 QUADS- PT NOTED TO HAVE HAD LARGE BM THIS
AM- 1-2+ BLE EDEMA NOTED WITH COOLNESS-FAIR PO INTAKE NOTED THIS AM- DENIES
ANY C/O PAIN AT THIS TIME- CALL LIGHT AND PERSONAL BELONGINGS WITH IN REACH-
HOURLY ROUNDS IN PLACE R/T SAFETY/NEEDS- ALL NEEDS MET AT THIS TIME-WCTM

## 2019-09-26 NOTE — CON
[address]  [address]                    CONSULTATION                  
_______________________________________________________________________________
 
Name:       CHRISTY LR                 Room:           M.230-P    DIS IN  
M.R.#:  [id number]      Account #:      [id number]  
Admission:  09/10/19     Attend Phys:    JANNY Rizvi
Discharge:  09/24/19     Date of Birth:  [birth year]  
         Report #: [id number]
                                                                     [id number]  
_______________________________________________________________________________
THIS REPORT FOR:  //name//                      
 
CC: Janice Hagen
 
DATE OF SERVICE:  09/10/2019
 
 
REQUESTING PHYSICIAN:  Buster Odonnell MD
 
REASON FOR CONSULTATION:  Acute kidney injury.
 
CONSULTATION:  Done by me today on 09/10/2019.
 
HISTORY OF PRESENT ILLNESS:  The patient is a 78-year-old female with multiple
medical problems.  She has history of liver cirrhosis, history of leukemia,
history of failure to thrive, presents with complaints of not feeling well,
having progressive shortness of breath.  Chest x-ray revealed some vascular
congestion and mild pulmonary edema.  She was found to be in atrial fibrillation
with rapid ventricular response with heart rate around 130.  Her creatinine is
2.1.  Baseline around 1.1.  She has very low muscle mass, so she does have
advanced kidney damage.
 
PAST MEDICAL HISTORY:  Significant for problems as I mentioned earlier.
 
SOCIAL HISTORY:  She has supportive daughter during my exam and daughter
provides care to her mother.  The patient lives with her  at home.  She
does not smoke, does not drink.
 
REVIEW OF SYSTEMS:  Positive for being very weak, shortness of breath, no
appetite.
 
FAMILY HISTORY:  Noncontributory.
 
MEDICATIONS:  Prior to admission reviewed.
 
PHYSICAL EXAMINATION:
GENERAL:  Awake, alert.
VITAL SIGNS:  Blood pressure is 82/55, heart rate 130 and irregular.
ABDOMEN:  Distended.  Ascitic fluid.
LOWER EXTREMITIES:  With some chronic edema.
 
ASSESSMENT:
1.  Acute kidney injury likely due to under perfusion of the kidneys due to
atrial fibrillation with rapid ventricular response.
2.  Atrial fibrillation with rapid ventricular response.
 
 
 
[place] [address]                    CONSULTATION                  
_______________________________________________________________________________
 
Name:       BECHRISTY FLORES                 Room:           M.230-P    DIS IN  
M.R.#:  [id number]      Account #:      [id number]  
Admission:  09/10/19     Attend Phys:    JANNY Rizvi
Discharge:  09/24/19     Date of Birth:  [birth year]  
         Report #: [id number]
                                                                     [id number]  
_______________________________________________________________________________
3.  Chronic kidney disease stage 3.
4.  Liver cirrhosis.
5.  Failure to thrive.
 
PLAN:
1.  Control heart rate.
2.  Monitor renal function.
3.  She is not a dialysis candidate.  Discussed that with the patient's daughter
who agrees that no dialysis should be considered.
 
 
 
 
 
 
 
 
 
 
 
 
 
 
 
 
 
 
 
 
 
 
 
 
 
 
 
 
 
 
 
 
 
 
 
<ELECTRONICALLY SIGNED>
                                        By:  Mark Guevara MD      
09/26/19     1034
D: 09/10/19 1038_______________________________________
T: 09/10/19 1857Aángel Guevara MD         /nt

## 2019-10-03 ENCOUNTER — HOSPITAL ENCOUNTER (INPATIENT)
Dept: HOSPITAL 96 - M.ERS | Age: 79
LOS: 1 days | Discharge: HOME | DRG: 177 | End: 2019-10-04
Attending: INTERNAL MEDICINE | Admitting: INTERNAL MEDICINE
Payer: MEDICARE

## 2019-10-03 VITALS — DIASTOLIC BLOOD PRESSURE: 41 MMHG | SYSTOLIC BLOOD PRESSURE: 84 MMHG

## 2019-10-03 VITALS — SYSTOLIC BLOOD PRESSURE: 100 MMHG | DIASTOLIC BLOOD PRESSURE: 60 MMHG

## 2019-10-03 VITALS — SYSTOLIC BLOOD PRESSURE: 98 MMHG | DIASTOLIC BLOOD PRESSURE: 59 MMHG

## 2019-10-03 VITALS — BODY MASS INDEX: 22.68 KG/M2 | WEIGHT: 115.5 LBS | HEIGHT: 60 IN

## 2019-10-03 DIAGNOSIS — Z79.899: ICD-10-CM

## 2019-10-03 DIAGNOSIS — K72.90: ICD-10-CM

## 2019-10-03 DIAGNOSIS — Z88.0: ICD-10-CM

## 2019-10-03 DIAGNOSIS — Z88.6: ICD-10-CM

## 2019-10-03 DIAGNOSIS — N18.2: ICD-10-CM

## 2019-10-03 DIAGNOSIS — K76.6: ICD-10-CM

## 2019-10-03 DIAGNOSIS — I50.23: ICD-10-CM

## 2019-10-03 DIAGNOSIS — E43: ICD-10-CM

## 2019-10-03 DIAGNOSIS — E03.9: ICD-10-CM

## 2019-10-03 DIAGNOSIS — I13.0: ICD-10-CM

## 2019-10-03 DIAGNOSIS — D61.818: ICD-10-CM

## 2019-10-03 DIAGNOSIS — R16.1: ICD-10-CM

## 2019-10-03 DIAGNOSIS — K74.60: ICD-10-CM

## 2019-10-03 DIAGNOSIS — Z88.2: ICD-10-CM

## 2019-10-03 DIAGNOSIS — J69.0: Primary | ICD-10-CM

## 2019-10-03 DIAGNOSIS — J96.10: ICD-10-CM

## 2019-10-03 LAB
ABSOLUTE EOSINOPHILS: 0.1 THOU/UL (ref 0–0.7)
ABSOLUTE MONOCYTES: 0.6 THOU/UL (ref 0–1.2)
ALBUMIN SERPL-MCNC: 2.8 G/DL (ref 3.4–5)
ALP SERPL-CCNC: 107 U/L (ref 46–116)
ALT SERPL-CCNC: 22 U/L (ref 30–65)
ANION GAP SERPL CALC-SCNC: 8 MMOL/L (ref 7–16)
APTT BLD: 28 SECONDS (ref 25–31.3)
AST SERPL-CCNC: 18 U/L (ref 15–37)
BILIRUB SERPL-MCNC: 0.6 MG/DL
BUN SERPL-MCNC: 26 MG/DL (ref 7–18)
CALCIUM SERPL-MCNC: 8.1 MG/DL (ref 8.5–10.1)
CHLORIDE SERPL-SCNC: 102 MMOL/L (ref 98–107)
CO2 SERPL-SCNC: 26 MMOL/L (ref 21–32)
CREAT SERPL-MCNC: 0.9 MG/DL (ref 0.6–1.3)
EOSINOPHIL NFR BLD: 1 %
GLUCOSE SERPL-MCNC: 90 MG/DL (ref 70–99)
GRANULOCYTES NFR BLD MANUAL: 91 %
HCT VFR BLD CALC: 28 % (ref 37–47)
HGB BLD-MCNC: 8.9 GM/DL (ref 12–15)
INR PPP: 1
LYMPHOCYTES # BLD: 0.4 THOU/UL (ref 0.8–5.3)
LYMPHOCYTES NFR BLD AUTO: 3 %
MCH RBC QN AUTO: 29.4 PG (ref 26–34)
MCHC RBC AUTO-ENTMCNC: 31.8 G/DL (ref 28–37)
MCV RBC: 92.7 FL (ref 80–100)
MONOCYTES NFR BLD: 5 %
MPV: 8.5 FL. (ref 7.2–11.1)
NEUTROPHILS # BLD: 10.7 THOU/UL (ref 1.6–8.1)
NT-PRO BRAIN NAT PEPTIDE: (no result) PG/ML (ref ?–300)
NUCLEATED RBCS: 0 /100WBC
PLATELET # BLD EST: ADEQUATE 10*3/UL
PLATELET COUNT*: 117 THOU/UL (ref 150–400)
POTASSIUM SERPL-SCNC: 4.7 MMOL/L (ref 3.5–5.1)
PROT SERPL-MCNC: 6.2 G/DL (ref 6.4–8.2)
PROTHROMBIN TIME: 10.6 SECONDS (ref 9.2–11.5)
RBC # BLD AUTO: 3.02 MIL/UL (ref 4.2–5)
RBC MORPH BLD: NORMAL
RDW-CV: 25.4 % (ref 10.5–14.5)
SODIUM SERPL-SCNC: 136 MMOL/L (ref 136–145)
TROPONIN-I LEVEL: <0.06 NG/ML (ref ?–0.06)
WBC # BLD AUTO: 11.8 THOU/UL (ref 4–11)

## 2019-10-04 VITALS — SYSTOLIC BLOOD PRESSURE: 75 MMHG | DIASTOLIC BLOOD PRESSURE: 42 MMHG

## 2019-10-04 VITALS — DIASTOLIC BLOOD PRESSURE: 59 MMHG | SYSTOLIC BLOOD PRESSURE: 95 MMHG

## 2019-10-04 VITALS — DIASTOLIC BLOOD PRESSURE: 47 MMHG | SYSTOLIC BLOOD PRESSURE: 102 MMHG

## 2019-10-04 VITALS — DIASTOLIC BLOOD PRESSURE: 59 MMHG | SYSTOLIC BLOOD PRESSURE: 75 MMHG

## 2019-10-04 VITALS — SYSTOLIC BLOOD PRESSURE: 102 MMHG | DIASTOLIC BLOOD PRESSURE: 48 MMHG

## 2019-10-04 LAB
ABSOLUTE BASOPHILS: 0.1 THOU/UL (ref 0–0.2)
ABSOLUTE EOSINOPHILS: 0.1 THOU/UL (ref 0–0.7)
ABSOLUTE MONOCYTES: 0.5 THOU/UL (ref 0–1.2)
ANION GAP SERPL CALC-SCNC: 8 MMOL/L (ref 7–16)
BASOPHILS NFR BLD AUTO: 1.4 %
BUN SERPL-MCNC: 29 MG/DL (ref 7–18)
CALCIUM SERPL-MCNC: 8.4 MG/DL (ref 8.5–10.1)
CHLORIDE SERPL-SCNC: 104 MMOL/L (ref 98–107)
CO2 SERPL-SCNC: 26 MMOL/L (ref 21–32)
CREAT SERPL-MCNC: 0.9 MG/DL (ref 0.6–1.3)
EOSINOPHIL NFR BLD: 0.6 %
GLUCOSE SERPL-MCNC: 90 MG/DL (ref 70–99)
GRANULOCYTES NFR BLD MANUAL: 82.4 %
HCT VFR BLD CALC: 27.1 % (ref 37–47)
HGB BLD-MCNC: 8.6 GM/DL (ref 12–15)
IRON SERPL-MCNC: 89 UG/DL (ref 50–175)
LYMPHOCYTES # BLD: 0.9 THOU/UL (ref 0.8–5.3)
LYMPHOCYTES NFR BLD AUTO: 9.7 %
MCH RBC QN AUTO: 29.6 PG (ref 26–34)
MCHC RBC AUTO-ENTMCNC: 31.7 G/DL (ref 28–37)
MCV RBC: 93.3 FL (ref 80–100)
MONOCYTES NFR BLD: 5.9 %
MPV: 7.9 FL. (ref 7.2–11.1)
NEUTROPHILS # BLD: 7.4 THOU/UL (ref 1.6–8.1)
NUCLEATED RBCS: 0 /100WBC
PLATELET COUNT*: 95 THOU/UL (ref 150–400)
POTASSIUM SERPL-SCNC: 4.7 MMOL/L (ref 3.5–5.1)
RBC # BLD AUTO: 2.9 MIL/UL (ref 4.2–5)
RDW-CV: 25.3 % (ref 10.5–14.5)
SAO2 % BLD FROM PO2: 33 % (ref 20–39)
SODIUM SERPL-SCNC: 138 MMOL/L (ref 136–145)
WBC # BLD AUTO: 9 THOU/UL (ref 4–11)

## 2019-10-04 NOTE — EKG
[place] [address]
Phone:  [phone]                     ELECTROCARDIOGRAM REPORT      
_______________________________________________________________________________
 
Name:       CHRISTY LR                 Room:           [address]    ADM IN  
.R.#:  [id number]      Account #:      [id number]  
Admission:  10/03/19     Attend Phys:    Richar Hall MD 
Discharge:               Date of Birth:  [birth year]  
         Report #: [id number]
    [id number]
_______________________________________________________________________________
THIS REPORT FOR:  //name//                      
 
                         [facility] ED
                                       
Test Date:    2019-10-03               Test Time:    14:43:03
Pat Name:     CHRISTY LR             Department:   
Patient ID:   [id number]            Room:         [facility]
Gender:       F                        Technician:   EV
:          1940               Requested By: Arie Delatorre
Order Number: [id number] MD:   Paul Wilson
                                 Measurements
Intervals                              Axis          
Rate:         73                       P:            
TX:                                    QRS:          56
QRSD:         110                      T:            213
QT:           376                                    
QTc:          415                                    
                           Interpretive Statements
Atrial fibrillation
Repol abnrm suggests ischemia, diffuse leads
Compared to ECG 2019 11:07:51
 
Possible ischemia now present
 
Prolonged QT interval no longer present
 
Electronically Signed On 10-4-2019 10:32:32 CDT by Paul Wilson
[email or web address]
 
 
 
 
 
 
 
 
 
 
 
 
 
 
 
  <ELECTRONICALLY SIGNED>
                                           By: Paul Wilson MD, FACC      
  10/04/19     1032
D: 10/03/19 1443   _____________________________________
T: 10/03/19 1443   Paul Wilson MD, FACC        /EPI

## 2019-10-06 NOTE — CON
[address]  [address]                    CONSULTATION                  
_______________________________________________________________________________
 
Name:       CHRISTY LR                 Room:           [address]#:  [id number]      Account #:      [id number]  
Admission:  10/03/19     Attend Phys:    Richar Hall MD 
Discharge:  10/04/19     Date of Birth:  [birth year]  
         Report #: [id number]
                                                                     [id number]  
_______________________________________________________________________________
THIS REPORT FOR:  //name//                      
 
CC: Richar Ibrahim MD
 
DICTATED BY: Goldie Meier [facility]
 
DATE OF SERVICE:  10/04/2019
 
 
Please note at the time of this dictation, the patient was seen and physically
examined by myself.
 
REASON FOR CONSULTATION:  Possible ascitic fluid of the abdomen, history of
cirrhosis.
 
HISTORY OF PRESENT ILLNESS:  This is a 78-year-old female who presented to the
Emergency Room with worsening of swelling in her lower legs and family felt that
her abdomen was increasing in size.  Ultrasound revealed no ascites fluid;
however, BNP was elevated for her congestive heart failure at 14,000.  The
patient has had numerous admissions for her congestive heart failure and her
cirrhosis of her liver.  The patient was diagnosed with cirrhosis of the liver
back in March of this year in which she has undergone numerous EGDs for variceal
banding and for surveillance.  Initially, her first variceal banding was in
March in which she had grade 3 varices.  Her last EGD was in June that showed
small varices less than 5 mm.  She had severe portal hypertension at that time
with oozing from the entire gastric antrum, likely related to her
thrombocytopenia as well.  She also had a colonoscopy back in April that showed
a rectal ulcer and nonbleeding external hemorrhoids that were noted at that
time.  The patient has never been followed up in the office.  She has been
taking midodrine 15 mg t.i.d. at home to help with her blood pressure,
spironolactone has been 50 mg and Lasix has been 20 mg as well as an iron
supplementation at home.
 
ALLERGIES:  PENICILLIN, SULFA AND CODEINE.
 
MEDICATIONS:  At home, Synthroid, again Lasix 20, midodrine 15, Omnicef, iron,
digoxin, vitamin D, Nexium, Cortef, lidocaine patch and multivitamin.
 
PAST MEDICAL HISTORY:  Heart disease, lung disease, cirrhosis, portal
hypertension.
 
FAMILY HISTORY:  Noncontributory.
 
SOCIAL HISTORY:  Lives at home with family.  Denies any alcohol, tobacco or
 
 
 
[place] [address]                    CONSULTATION                  
_______________________________________________________________________________
 
Name:       CHRISTY LR MONCHO                 Room:           [address]#:  [id number]      Account #:      [id number]  
Admission:  10/03/19     Attend Phys:    Richar Hall MD 
Discharge:  10/04/19     Date of Birth:  [birth year]  
         Report #: [id number]
                                                                     [id number]  
_______________________________________________________________________________
illegal drug use.
 
REVIEW OF SYSTEMS:  Twelve-point review of systems is essentially negative
except what is mentioned in the HPI.
 
PHYSICAL EXAMINATION:
VITAL SIGNS:  Temperature 36.3, pulse 78, respirations 18, blood pressure 75/59.
 GENERALIZED APPEARANCE:  The patient is very cachectic looking and can barely
keep her eyes open during the consultation.
HEART:  Regular rate and rhythm.
LUNGS:  Diminished, but clear.
ABDOMEN:  Soft, positive bowel sounds in all 4 quadrants with no masses or
tenderness noted.
 
LABORATORY DATA:  Hemoglobin is 8.6, white count 9.8, platelets 95.  GFR is 60. 
PT 10.6, INR is 1.  Albumin is 2.8.  BNP was 14,594 and her overall MELD score
was 6.  Ultrasound essentially negative for any ascitic fluid.
 
IMPRESSION:
1.  Abdominal distention, likely secondary to small bowel bacterial overgrowth.
2.  Gas and bloating.
3.  History of cirrhosis with variceal banding.
4.  Long-term PPI use.
5.  Congestive heart failure.
 
PLAN:
1.  Outpatient breath test.
2.  Further recommendations to be made once that has been performed.
 
Thank you for allowing us to participate in this patient's care.  Please do not
hesitate to call with any questions in regard to this consult.
 
Dr. Cunningham Addendum
 
Discussed the patient's overall status with the patient's daughter who is the
DPOA. The patient had end stage liver disease and therefore is not a transplant
candidate, she will continue to get worse as her liver function continues to
decline. The abdominal distension is due to lactulose that she is currently on.
Unfortunately, she will need to be on it due to encephalopathy. Further,
rifaximin needs to be added to the regimen and it may have the additional
effect of reducing small bowel bacterial overgrowth. No role of H2 breath test
here. Her prognosis continues to remain poor and guarded. 
 
 
<ELECTRONICALLY SIGNED>
                                        By:  Yonas Cunningham MD         
10/06/19     1220
D: 10/04/19 1155_______________________________________
T: 10/04/19 1225Yonas Cunningham MD            /nt

## 2019-10-07 NOTE — CON
[address]  [address]                    CONSULTATION                  
_______________________________________________________________________________
 
Name:       CHRISTY LR                 Room:           [address]#:  [id number]      Account #:      [id number]  
Admission:  10/03/19     Attend Phys:    Gaston Hall MD 
Discharge:  10/04/19     Date of Birth:  [birth year]  
         Report #: [id number]
                                                                     [id number]  
_______________________________________________________________________________
THIS REPORT FOR:  //name//                      
 
CC: GASTON Camacho [place]edom
 
DATE OF SERVICE:  10/04/2019
 
 
REASON FOR CONSULTATION:  Anemia.
 
HISTORY OF PRESENT ILLNESS:  A 78-year-old  female who has been
evaluated today because of chronic anemia.  The patient had liver cirrhosis and
she has been admitted because of lower extremity edema, increased abdominal
girth.  She has a drainage, paracentesis previously.  The patient had prior
cytology of the pleural effusion, which was negative for malignant cells.  She
had a bone marrow biopsy done way back in 03/2019 showed hypercellular bone
marrow with trilineage hematopoiesis.  There is increased atypical clustered
megakaryocyte consistent with myeloproliferative process.  There is diffuse 4/4
reticulin fibrosis, no stainable iron.  There is a differential diagnosis
including mixed MDS/MPN.  Today, her hemoglobin was 8.6 with a platelet count of
95.  The patient has a long history of anemia; however, she had a worsening of
her anemia in the last month.  She was seen by Dr. Cunningham and she had a
previous history of congestive heart failure in addition to liver cirrhosis. 
She underwent numerous EGDs and varices banding for surveillance.  The patient
was reported to have severe portal hypertension and she has oozing from the
entire gastric antrum.  Also, her colonoscopy showed a rectal ulcer with
nonbleeding external hemorrhoids.
 
REVIEW OF SYSTEMS:  All systems reviewed.  It was negative except the above.
 
PAST MEDICAL HISTORY:  Liver cirrhosis, portal hypertension, heart disease per
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
and hypothyroidism.
 
MEDICATIONS:  Per admission list.
 
ALLERGIES:  PENICILLIN, SULFA AND CODEINE.
 
FAMILY HISTORY:  Noncontributory.
 
SOCIAL HISTORY:  No smoking, no alcohol abuse, no drug abuse.
 
PHYSICAL EXAMINATION:
VITAL SIGNS:  Today, temperature 36.6, pulse of 59, respirations 17, blood
pressure 75/42 and SpO2 was 96% on 2 liters.
 
 
 
[place] [address]                    CONSULTATION                  
_______________________________________________________________________________
 
Name:       CHRISTY LR                 Room:           [address]#:  [id number]      Account #:      [id number]  
Admission:  10/03/19     Attend Phys:    Gaston Hall MD 
Discharge:  10/04/19     Date of Birth:  [birth year]  
         Report #: [id number]
                                                                     [id number]  
_______________________________________________________________________________
GENERAL:  The patient was sitting at the edge of the bed.  She looked tired,
cachectic.  She was able to communicate verbally.  I discussed with the daughter
who is the DPOA over the phone.
LUNGS:  Decreased breathing sounds bilaterally.
HEART:  Regular rate and rhythm.  S1, S2 within normal limits.
ABDOMEN:  Soft and nontender.
EXTREMITIES:  A +1 edema bilaterally.
 
LABORATORY DATA:  Labs today, WBC 9.0, hemoglobin 8.6 and platelets 95.  PT is
10.6, PTT 28.0, creatinine 0.9 and calcium is 8.4.
 
IMAGING:  Abdominal ultrasound today showed no ascites identified.
 
ASSESSMENT AND PLAN:  A 78-year-old  female who has advanced liver
disease.  The patient was evaluated because of chronic anemia and chronic
thrombocytopenia.  I do not believe there is any benefit in Epogen shot at this
point.  The patient had previously bone marrow biopsy showed myeloproliferative
disease/questionable myelodysplastic syndrome.  I think her prognosis will be
determined by her liver cirrhosis at this point.  Due to no stainable iron in
her bone marrow, I would like to obtain a workup including ferritin, iron
studies, B12, folate, also peripheral blood smear.  Agree with supportive care
at this point.  No aggressive treatment due to general performance status,
comorbidities and advanced age.  Plan was discussed with the family.
 
 
 
 
 
 
 
 
 
 
 
 
 
 
 
 
 
 
 
 
 
<ELECTRONICALLY SIGNED>
                                        By:  Anuj Leiws MD          
10/07/19     1456
D: 10/04/19 1754_______________________________________
T: 10/04/19 2131Anuj Lewis MD             /nt

## 2020-01-28 ENCOUNTER — HOSPITAL ENCOUNTER (OUTPATIENT)
Dept: HOSPITAL 96 - M.RTH | Age: 80
End: 2020-01-28
Attending: INTERNAL MEDICINE
Payer: MEDICARE

## 2020-01-28 DIAGNOSIS — Z79.899: ICD-10-CM

## 2020-01-28 DIAGNOSIS — D75.81: Primary | ICD-10-CM

## 2020-01-28 DIAGNOSIS — D64.9: ICD-10-CM

## 2020-01-28 LAB
ABSOLUTE MONOCYTES: 0.3 THOU/UL (ref 0–1.2)
ALBUMIN SERPL-MCNC: 3.2 G/DL (ref 3.4–5)
ALP SERPL-CCNC: 102 U/L (ref 46–116)
ALT SERPL-CCNC: 17 U/L (ref 30–65)
ANION GAP SERPL CALC-SCNC: 8 MMOL/L (ref 7–16)
ANISOCYTOSIS BLD QL SMEAR: (no result)
AST SERPL-CCNC: 18 U/L (ref 15–37)
BILIRUB SERPL-MCNC: 0.5 MG/DL
BUN SERPL-MCNC: 43 MG/DL (ref 7–18)
CALCIUM SERPL-MCNC: 8.5 MG/DL (ref 8.5–10.1)
CHLORIDE SERPL-SCNC: 101 MMOL/L (ref 98–107)
CO2 SERPL-SCNC: 26 MMOL/L (ref 21–32)
CREAT SERPL-MCNC: 1.1 MG/DL (ref 0.6–1.3)
GLUCOSE SERPL-MCNC: 109 MG/DL (ref 70–99)
GRANULOCYTES NFR BLD MANUAL: 83 %
HCT VFR BLD CALC: 30.8 % (ref 37–47)
HGB BLD-MCNC: 10.2 GM/DL (ref 12–15)
IRON SERPL-MCNC: 51 UG/DL (ref 50–175)
LYMPHOCYTES # BLD: 1.3 THOU/UL (ref 0.8–5.3)
LYMPHOCYTES NFR BLD AUTO: 12 %
MCH RBC QN AUTO: 28.2 PG (ref 26–34)
MCHC RBC AUTO-ENTMCNC: 32.9 G/DL (ref 28–37)
MCV RBC: 85.6 FL (ref 80–100)
MONOCYTES NFR BLD: 3 %
MPV: 8.1 FL. (ref 7.2–11.1)
MYELOCYTES NFR BLD: 1 %
NEUTROPHILS # BLD: 9.2 THOU/UL (ref 1.6–8.1)
NEUTS BAND NFR BLD: 1 %
NUCLEATED RBCS: 0 /100WBC
PLATELET # BLD EST: (no result) 10*3/UL
PLATELET COUNT*: 135 THOU/UL (ref 150–400)
POIKILOCYTOSIS BLD QL SMEAR: (no result)
POTASSIUM SERPL-SCNC: 4.7 MMOL/L (ref 3.5–5.1)
PROT SERPL-MCNC: 7.1 G/DL (ref 6.4–8.2)
RBC # BLD AUTO: 3.6 MIL/UL (ref 4.2–5)
RDW-CV: 20.1 % (ref 10.5–14.5)
SAO2 % BLD FROM PO2: 17 % (ref 20–39)
SODIUM SERPL-SCNC: 135 MMOL/L (ref 136–145)
WBC # BLD AUTO: 10.8 THOU/UL (ref 4–11)

## 2020-11-03 ENCOUNTER — HOSPITAL ENCOUNTER (EMERGENCY)
Dept: HOSPITAL 96 - M.ERS | Age: 80
Discharge: HOME | End: 2020-11-03
Payer: MEDICARE

## 2020-11-03 VITALS — DIASTOLIC BLOOD PRESSURE: 63 MMHG | SYSTOLIC BLOOD PRESSURE: 92 MMHG

## 2020-11-03 VITALS — BODY MASS INDEX: 16.9 KG/M2 | HEIGHT: 64 IN | WEIGHT: 99.01 LBS

## 2020-11-03 DIAGNOSIS — R62.7: ICD-10-CM

## 2020-11-03 DIAGNOSIS — R41.82: ICD-10-CM

## 2020-11-03 DIAGNOSIS — Z20.828: ICD-10-CM

## 2020-11-03 DIAGNOSIS — E86.0: Primary | ICD-10-CM

## 2020-11-03 LAB
ABSOLUTE EOSINOPHILS: 0.5 THOU/UL (ref 0–0.7)
ABSOLUTE MONOCYTES: 0.5 THOU/UL (ref 0–1.2)
ALBUMIN SERPL-MCNC: 3.4 G/DL (ref 3.4–5)
ALP SERPL-CCNC: 80 U/L (ref 46–116)
ALT SERPL-CCNC: 20 U/L (ref 30–65)
AMMONIA PLAS-SCNC: 29 UMOL/L (ref 11–32)
ANION GAP SERPL CALC-SCNC: 10 MMOL/L (ref 7–16)
AST SERPL-CCNC: 23 U/L (ref 15–37)
BE: -3.1 MMOL/L
BILIRUB SERPL-MCNC: 0.5 MG/DL
BILIRUB UR-MCNC: NEGATIVE MG/DL
BUN SERPL-MCNC: 52 MG/DL (ref 7–18)
CALCIUM SERPL-MCNC: 9.6 MG/DL (ref 8.5–10.1)
CHLORIDE SERPL-SCNC: 103 MMOL/L (ref 98–107)
CO2 SERPL-SCNC: 25 MMOL/L (ref 21–32)
COLOR UR: YELLOW
CREAT SERPL-MCNC: 1.1 MG/DL (ref 0.6–1.3)
EOSINOPHIL NFR BLD: 3 %
GLUCOSE SERPL-MCNC: 171 MG/DL (ref 70–99)
GRANULOCYTES NFR BLD MANUAL: 89 %
HCT VFR BLD CALC: 37.3 % (ref 37–47)
HGB BLD-MCNC: 11.8 GM/DL (ref 12–15)
INR PPP: 1.1
KETONES UR STRIP-MCNC: NEGATIVE MG/DL
LYMPHOCYTES # BLD: 0.8 THOU/UL (ref 0.8–5.3)
LYMPHOCYTES NFR BLD AUTO: 5 %
MAGNESIUM SERPL-MCNC: 2.6 MG/DL (ref 1.8–2.4)
MCH RBC QN AUTO: 27.5 PG (ref 26–34)
MCHC RBC AUTO-ENTMCNC: 31.7 G/DL (ref 28–37)
MCV RBC: 86.7 FL (ref 80–100)
MONOCYTES NFR BLD: 3 %
MPV: 8.5 FL. (ref 7.2–11.1)
NEUTROPHILS # BLD: 13.7 THOU/UL (ref 1.6–8.1)
NUCLEATED RBCS: 0 /100WBC
PCO2 BLD: 36.2 MMHG (ref 35–45)
PLATELET # BLD EST: ADEQUATE 10*3/UL
PLATELET COUNT*: 294 THOU/UL (ref 150–400)
PO2 BLD: 80.9 MMHG (ref 75–100)
POTASSIUM SERPL-SCNC: 4.8 MMOL/L (ref 3.5–5.1)
PROT SERPL-MCNC: 7.4 G/DL (ref 6.4–8.2)
PROT UR QL STRIP: NEGATIVE
PROTHROMBIN TIME: 11.2 SECONDS (ref 9.2–11.5)
RBC # BLD AUTO: 4.3 MIL/UL (ref 4.2–5)
RBC # UR STRIP: NEGATIVE /UL
RBC MORPH BLD: NORMAL
RDW-CV: 17.9 % (ref 10.5–14.5)
SODIUM SERPL-SCNC: 138 MMOL/L (ref 136–145)
SP GR UR STRIP: 1.02 (ref 1–1.03)
URINE CLARITY: CLEAR
URINE GLUCOSE-RANDOM: NEGATIVE
URINE LEUKOCYTES-REFLEX: NEGATIVE
URINE NITRITE-REFLEX: NEGATIVE
UROBILINOGEN UR STRIP-ACNC: 0.2 E.U./DL (ref 0.2–1)
WBC # BLD AUTO: 15.4 THOU/UL (ref 4–11)

## 2020-11-04 NOTE — EKG
[place] [address]
Phone:  [phone]                     ELECTROCARDIOGRAM REPORT      
_______________________________________________________________________________
 
Name:         CHRISTY LR                Room:                     [facility]#:    [id number]     Account #:     [id number]  
Admission:    20    Attend Phys:                     
Discharge:    20    Date of Birth: [birth year]  
Date of Service: 20 1336  Report #:      [id number]
        [id number]
_______________________________________________________________________________
THIS REPORT FOR:  //name//                      
 
                         [facility] ED
                                       
Test Date:    2020               Test Time:    13:36:34
Pat Name:     CHRISTY LR             Department:   
Patient ID:   [id number]            Room:          
Gender:       F                        Technician:   
:          1940               Requested By: Clover Rowe
Order Number: [id number] MD:   Paul Wilson
                                 Measurements
Intervals                              Axis          
Rate:         84                       P:            
NM:                                    QRS:          44
QRSD:         130                      T:            152
QT:           431                                    
QTc:          510                                    
                           Interpretive Statements
Atrial fibrillation
Nonspecific intraventricular conduction delay
Borderline repolarization abnormality
Compared to ECG 10/03/2019 14:43:03
 
Possible ischemia no longer present
Electronically Signed On 2020 11:26:27 CST by Paul Wilson
[email or web address]
 
 
 
 
 
 
 
 
 
 
 
 
 
 
 
 
 
 
  <ELECTRONICALLY SIGNED>
                                           By: Paul Wilson MD, [facility]      
  20     1126
D: 20 1336   _____________________________________
T: 20 1336   Paul Wilson MD, [facility]        /EPI

## 2020-12-25 NOTE — NUR
AM ASSESSMENT AND VITAL SIGNS COMPLETED AS DOCUMENTED. PT WAS INCONTINENT OF
LIQUID STOOL AND URINE, SPECIMEN SENT TO THE LAB FOR OCCULT BLOOD. PT
CONTINUES TO BE VERY WEAK THIS AM AND IS REQUESTING MORE ASSISTANCE WITH BED
MOBILITY AND TOILETING. PT ENCOURAGED TO BE INDEPENDENT WITH ACTIVITIES WHEN
ABLE. FALL PRECAUTIONS AND HOURLY ROUNDING IN PLACE. 25-Dec-2020 15:42

## 2021-06-20 ENCOUNTER — HOSPITAL ENCOUNTER (INPATIENT)
Dept: HOSPITAL 96 - M.ERS | Age: 81
LOS: 1 days | Discharge: HOME | DRG: 393 | End: 2021-06-21
Attending: INTERNAL MEDICINE | Admitting: INTERNAL MEDICINE
Payer: MEDICARE

## 2021-06-20 VITALS — DIASTOLIC BLOOD PRESSURE: 32 MMHG | SYSTOLIC BLOOD PRESSURE: 86 MMHG

## 2021-06-20 VITALS — HEIGHT: 62.99 IN | BODY MASS INDEX: 20.61 KG/M2 | WEIGHT: 116.3 LBS

## 2021-06-20 VITALS — DIASTOLIC BLOOD PRESSURE: 40 MMHG | SYSTOLIC BLOOD PRESSURE: 81 MMHG

## 2021-06-20 VITALS — DIASTOLIC BLOOD PRESSURE: 38 MMHG | SYSTOLIC BLOOD PRESSURE: 79 MMHG

## 2021-06-20 VITALS — SYSTOLIC BLOOD PRESSURE: 84 MMHG | DIASTOLIC BLOOD PRESSURE: 40 MMHG

## 2021-06-20 DIAGNOSIS — R65.10: ICD-10-CM

## 2021-06-20 DIAGNOSIS — D72.829: ICD-10-CM

## 2021-06-20 DIAGNOSIS — K64.8: Primary | ICD-10-CM

## 2021-06-20 DIAGNOSIS — K74.60: ICD-10-CM

## 2021-06-20 DIAGNOSIS — N39.0: ICD-10-CM

## 2021-06-20 DIAGNOSIS — E03.9: ICD-10-CM

## 2021-06-20 DIAGNOSIS — Z85.6: ICD-10-CM

## 2021-06-20 DIAGNOSIS — Z88.0: ICD-10-CM

## 2021-06-20 DIAGNOSIS — D75.81: ICD-10-CM

## 2021-06-20 DIAGNOSIS — Z79.899: ICD-10-CM

## 2021-06-20 DIAGNOSIS — Z20.822: ICD-10-CM

## 2021-06-20 DIAGNOSIS — N18.6: ICD-10-CM

## 2021-06-20 DIAGNOSIS — I85.00: ICD-10-CM

## 2021-06-20 DIAGNOSIS — Z88.2: ICD-10-CM

## 2021-06-20 DIAGNOSIS — I50.9: ICD-10-CM

## 2021-06-20 DIAGNOSIS — I48.91: ICD-10-CM

## 2021-06-20 DIAGNOSIS — Z88.5: ICD-10-CM

## 2021-06-20 LAB
ABSOLUTE BASOPHILS: 0.5 THOU/UL (ref 0–0.2)
ABSOLUTE EOSINOPHILS: 0.1 THOU/UL (ref 0–0.7)
ABSOLUTE MONOCYTES: 0.2 THOU/UL (ref 0–1.2)
ALBUMIN SERPL-MCNC: 3.3 G/DL (ref 3.4–5)
ALP SERPL-CCNC: 83 U/L (ref 46–116)
ALT SERPL-CCNC: 14 U/L (ref 30–65)
ANION GAP SERPL CALC-SCNC: 10 MMOL/L (ref 7–16)
ANISOCYTOSIS BLD QL SMEAR: (no result)
AST SERPL-CCNC: 15 U/L (ref 15–37)
BACTERIA-REFLEX: (no result) /HPF
BASOPHILS NFR BLD AUTO: 4 %
BILIRUB SERPL-MCNC: 0.7 MG/DL
BILIRUB UR-MCNC: NEGATIVE MG/DL
BUN SERPL-MCNC: 47 MG/DL (ref 7–18)
CALCIUM SERPL-MCNC: 8.9 MG/DL (ref 8.5–10.1)
CHLORIDE SERPL-SCNC: 100 MMOL/L (ref 98–107)
CO2 SERPL-SCNC: 23 MMOL/L (ref 21–32)
COLOR UR: YELLOW
CREAT SERPL-MCNC: 1.1 MG/DL (ref 0.6–1.3)
EOSINOPHIL NFR BLD: 1 %
GLUCOSE SERPL-MCNC: 147 MG/DL (ref 70–99)
GRANULOCYTES NFR BLD MANUAL: 79 %
HCT VFR BLD CALC: 35.7 % (ref 37–47)
HGB BLD-MCNC: 11.7 GM/DL (ref 12–15)
HYALINE CASTS #/AREA URNS LPF: (no result) /LPF
KETONES UR STRIP-MCNC: NEGATIVE MG/DL
LIPASE: 90 U/L (ref 73–393)
LYMPHOCYTES # BLD: 0.8 THOU/UL (ref 0.8–5.3)
LYMPHOCYTES NFR BLD AUTO: 7 %
MCH RBC QN AUTO: 28.5 PG (ref 26–34)
MCHC RBC AUTO-ENTMCNC: 32.7 G/DL (ref 28–37)
MCV RBC: 87 FL (ref 80–100)
METAMYELOCYTES NFR BLD: 1 %
MONOCYTES NFR BLD: 2 %
MPV: 8.4 FL. (ref 7.2–11.1)
NEUTROPHILS # BLD: 10.1 THOU/UL (ref 1.6–8.1)
NEUTS BAND NFR BLD: 6 %
NUCLEATED RBCS: 0 /100WBC
PLATELET # BLD EST: ADEQUATE 10*3/UL
PLATELET COUNT*: 248 THOU/UL (ref 150–400)
POTASSIUM SERPL-SCNC: 4.3 MMOL/L (ref 3.5–5.1)
PROT SERPL-MCNC: 6.9 G/DL (ref 6.4–8.2)
PROT UR QL STRIP: NEGATIVE
RBC # BLD AUTO: 4.1 MIL/UL (ref 4.2–5)
RBC # UR STRIP: (no result) /UL
RBC #/AREA URNS HPF: (no result) /HPF (ref 0–2)
RDW-CV: 17.7 % (ref 10.5–14.5)
SODIUM SERPL-SCNC: 133 MMOL/L (ref 136–145)
SP GR UR STRIP: 1.02 (ref 1–1.03)
SQUAMOUS: (no result) /LPF (ref 0–3)
URINE CLARITY: CLEAR
URINE GLUCOSE-RANDOM: NEGATIVE
URINE LEUKOCYTES-REFLEX: (no result)
URINE NITRITE-REFLEX: NEGATIVE
URINE WBC-REFLEX: (no result) /HPF (ref 0–5)
UROBILINOGEN UR STRIP-ACNC: 0.2 E.U./DL (ref 0.2–1)
WBC # BLD AUTO: 11.8 THOU/UL (ref 4–11)

## 2021-06-20 NOTE — NUR
ER ADMIT TO 
TO ROOM VIA CART
ASSIST OF 3 FROM CART TO BED
DENIES PAIN
ORIENTED TO CALL LIGHT AND ROOM
ADMISSION AND HISTORY TO BE DONE

## 2021-06-20 NOTE — NUR
CT SCAN delayed due to pt request for juliocesar. Sondra asked (Justa) if 
 they would  call us as
soon as we can get her completed.

## 2021-06-21 VITALS — SYSTOLIC BLOOD PRESSURE: 105 MMHG | DIASTOLIC BLOOD PRESSURE: 60 MMHG

## 2021-06-21 VITALS — SYSTOLIC BLOOD PRESSURE: 97 MMHG | DIASTOLIC BLOOD PRESSURE: 49 MMHG

## 2021-06-21 VITALS — SYSTOLIC BLOOD PRESSURE: 91 MMHG | DIASTOLIC BLOOD PRESSURE: 31 MMHG

## 2021-06-21 VITALS — SYSTOLIC BLOOD PRESSURE: 98 MMHG | DIASTOLIC BLOOD PRESSURE: 38 MMHG

## 2021-06-21 VITALS — DIASTOLIC BLOOD PRESSURE: 38 MMHG | SYSTOLIC BLOOD PRESSURE: 98 MMHG

## 2021-06-21 VITALS — DIASTOLIC BLOOD PRESSURE: 45 MMHG | SYSTOLIC BLOOD PRESSURE: 91 MMHG

## 2021-06-21 LAB
ABSOLUTE BASOPHILS: 0.1 THOU/UL (ref 0–0.2)
ABSOLUTE EOSINOPHILS: 0.1 THOU/UL (ref 0–0.7)
ABSOLUTE MONOCYTES: 0.6 THOU/UL (ref 0–1.2)
ALBUMIN SERPL-MCNC: 3.2 G/DL (ref 3.4–5)
ALP SERPL-CCNC: 85 U/L (ref 46–116)
ALT SERPL-CCNC: 10 U/L (ref 30–65)
ANION GAP SERPL CALC-SCNC: 9 MMOL/L (ref 7–16)
ANISOCYTOSIS BLD QL SMEAR: (no result)
AST SERPL-CCNC: 16 U/L (ref 15–37)
BASOPHILS NFR BLD AUTO: 1 %
BILIRUB SERPL-MCNC: 0.5 MG/DL
BUN SERPL-MCNC: 44 MG/DL (ref 7–18)
CALCIUM SERPL-MCNC: 8.9 MG/DL (ref 8.5–10.1)
CHLORIDE SERPL-SCNC: 100 MMOL/L (ref 98–107)
CO2 SERPL-SCNC: 24 MMOL/L (ref 21–32)
CREAT SERPL-MCNC: 1.1 MG/DL (ref 0.6–1.3)
EOSINOPHIL NFR BLD: 1 %
GLUCOSE SERPL-MCNC: 103 MG/DL (ref 70–99)
GRANULOCYTES NFR BLD MANUAL: 81 %
HCT VFR BLD CALC: 34.6 % (ref 37–47)
HGB BLD-MCNC: 11.4 GM/DL (ref 12–15)
INR PPP: 1.1
LYMPHOCYTES # BLD: 1.9 THOU/UL (ref 0.8–5.3)
LYMPHOCYTES NFR BLD AUTO: 13 %
MCH RBC QN AUTO: 28.6 PG (ref 26–34)
MCHC RBC AUTO-ENTMCNC: 33.1 G/DL (ref 28–37)
MCV RBC: 86.3 FL (ref 80–100)
MONOCYTES NFR BLD: 4 %
MPV: 8.1 FL. (ref 7.2–11.1)
NEUTROPHILS # BLD: 11.7 THOU/UL (ref 1.6–8.1)
NUCLEATED RBCS: 0 /100WBC
OVALOCYTES BLD QL SMEAR: (no result)
PLATELET # BLD EST: ADEQUATE 10*3/UL
PLATELET COUNT*: 254 THOU/UL (ref 150–400)
POIKILOCYTOSIS BLD QL SMEAR: (no result)
POTASSIUM SERPL-SCNC: 5.5 MMOL/L (ref 3.5–5.1)
PROT SERPL-MCNC: 6.7 G/DL (ref 6.4–8.2)
PROTHROMBIN TIME: 11.2 SECONDS (ref 9.2–11.5)
RBC # BLD AUTO: 4 MIL/UL (ref 4.2–5)
RDW-CV: 17.7 % (ref 10.5–14.5)
SODIUM SERPL-SCNC: 133 MMOL/L (ref 136–145)
WBC # BLD AUTO: 14.4 THOU/UL (ref 4–11)

## 2021-06-21 NOTE — NUR
ASSUMED CARE OF PT AFTER REPORT AT 1930. PT A&OX4. VSS. PHYSICAL ASSESSMENT
COMPLETED AND CHARTED. PT ON RA. PT TRACING AFIB ON TELE. PT UPWITH 1 ASSIST.
FAMILY AT BS. NO EPISODES OF HEMATOCHEZIA OVERNIGHT. PT COMPLAINED OF BACK
PAIN BUT REFUSED PAIN MEDS. FALL PRECAUTIONS IN PLACE. CALL LIGHT WITHIN
REACH.

## 2021-06-21 NOTE — NUR
Dr. Dawsno cleared pt for discharge from his standpoint as pt's hgb stable,
and OB stool negative.  Pt's dtr (DPOA) requesting that pt be discharged
tonight.  Dr. Hall paged, states he will enter orders later, but was
driving home at the time of call and would need time to get to a computer to
enter discharge orders.  Pt's dtr agreeable to discharge later, and states
"it's just easier to manage her care at home."  VSS.  Awaiting discharge
orders from Dr. Hall.

## 2021-06-21 NOTE — EKG
[place] [address]
Phone:  [phone]                     ELECTROCARDIOGRAM REPORT      
_______________________________________________________________________________
 
Name:         CHRISTY LR                Room:          [address] [place] 
.R.#:    [id number]     Account #:     [id number]  
Admission:    21    Attend Phys:   Junior Hagen
Discharge:                Date of Birth: [birth year]  
Date of Service: 21 1325  Report #:      [id number]
        [id number]
_______________________________________________________________________________
THIS REPORT FOR:  //name//                      
 
                         [facility] ED
                                       
Test Date:    2021               Test Time:    13:25:58
Pat Name:     CHRISTY LR             Department:   
Patient ID:   [id number]            Room:         [facility]
Gender:       F                        Technician:   GURPREET
:          1940               Requested By: Sohail Ly
Order Number: [id number] MD:   Nando Mckeon
                                 Measurements
Intervals                              Axis          
Rate:         85                       P:            
IA:                                    QRS:          44
QRSD:         113                      T:            182
QT:           331                                    
QTc:          394                                    
                           Interpretive Statements
Atrial fibrillation
Borderline intraventricular conduction delay
Borderline repolarization abnormality
Compared to ECG 2020 13:36:34
No significant changes
Electronically Signed On 2021 10:42:51 CDT by Nando Mckeon
[email or web address]
 
 
 
 
 
 
 
 
 
 
 
 
 
 
 
 
 
 
 
  <ELECTRONICALLY SIGNED>
                                           By: Nando Mckeon MD, [facility]     
  21     1042
D: 21 1325   _____________________________________
T: 21 1325   Nando Mckeon MD, [facility]       /EPI

## 2021-06-21 NOTE — NUR
CM ASSESSMENT:
PT A&O. PT'S DTR AT THE BEDSIDE AND ANSWERING CM ASSESSMENT QUESTIONS. PT
RESIDES AT HOME WITH HER DTR AND HER DTR ASSIST THE PT WILL ALL CARES. PT
AMBULATES WITH HAND HELD GUIDING PER HER DTR, AND DOES NOT USE DME FOR
MOBILITY. PT OWNS A WALKER, WHEELCHAIR, LIFT CHAIR, AND BSC. PT HAS PAST HX OF
HH, BUT HER DTR INFORMS THAT SHE 'DOES NOT NEED IT, AND WE DONT WANT IT'. NO
OTHER CM D/C PLANNING NEEDS ANTICIPATED. CM WILL REMAIN AVAILABLE TO ASSIST
AND FOLLOW AS NEEDED.

## 2021-06-22 NOTE — CON
[address]  [address]                    CONSULTATION                  
_______________________________________________________________________________
 
Name:       CHRISTY LR                 Room:           [address]..#:  [id number]      Account #:      [id number]  
Admission:  06/20/21     Attend Phys:    JANNY Rizvi
Discharge:  06/21/21     Date of Birth:  [birth year]  
         Report #: [id number]
                                                                     [id number]
_______________________________________________________________________________
THIS REPORT FOR:  
 
cc:  Jacklein Cruz Angela Jo RNP Vardakis, Gregory DO ~
 
 
DOC #: [id number]
 
cc:     ALONZO Hu FNP
DATE OF CONSULTATION: 06/21/2021
 
Please note at the time of this dictation, the patient was seen and physically 
examined by myself.
 
REASON FOR CONSULTATION:  Rectal bleeding.
 
HISTORY OF PRESENT ILLNESS:  This is an 80-year-old female who yesterday family 
noticed after a bowel movement, she had noticed some spotting beforehand and 
then she had another bowel movement that they notice some bright red bleeding 
prompting them to bring her in for further evaluation.  They have not seen any 
more bright red bloody stools.  The patient denied any pain with the bowel 
movement.  She does have several soft to a little loose bowel movements daily 
and some incontinence at night.  Daughter and  care for her at home.  
They thought that when she went to the bathroom later in the day that the 
bleeding was a little worse thinking that they needed to bring her in to be 
seen.  The patient has been seen by us, the last time was in 06/2019, we 
personally saw her in 03/2019 in which she was diagnosed with cirrhosis of the 
liver.  She had ascites and grade 3 esophageal varices.  She underwent repeat 
EGD in 06/2019 that showed very small varices and severe portal hypertensive 
gastropathy with some oozing noted.  Also in 04/2019, she had a single ulcer in 
the rectum and nonbleeding internal hemorrhoids were noted.  She is being 
maintained on midodrine 5 mg t.i.d., spironolactone 50 and lactulose 20 b.i.d.  
The patient is very weak and lays, most of time sleeping in the bed.
 
ALLERGIES:  PENICILLIN, SULFA, AND CODEINE.
 
MEDICATIONS FROM HOME:  Include midodrine 5 mg t.i.d., spironolactone 50 mg 
daily, lactulose 20 grams b.i.d., levothyroxine 200 mcg and Lasix 20.
 
PAST MEDICAL HISTORY:  Leukemia untreated cirrhosis, end-stage renal disease, 
autoimmune hepatitis, hypothyroidism.  She has got masses in her chest and 
abdominal cavity.  She has myelofibrosis with chronic pancytopenia, history of 
varices, chronic kidney disease and cholelithiasis.
 
 
 
 
[place] [address]                    CONSULTATION                  
_______________________________________________________________________________
 
Name:       CHRISTY LR                 Room:           [address].#:  [id number]      Account #:      [id number]  
Admission:  06/20/21     Attend Phys:    JANNY Rizvi
Discharge:  06/21/21     Date of Birth:  [birth year]  
         Report #: [id number]
                                                                     [id number]
_______________________________________________________________________________
 
 
PAST SURGICAL HISTORY:  Appendectomy and tonsillectomy.
 
FAMILY HISTORY:  Noncontributory.
 
SOCIAL HISTORY:  Lives with her  and daughter.
 
REVIEW OF SYSTEMS:  Twelve-point review of systems is essentially negative 
except what is mentioned in the HPI.
 
PHYSICAL EXAMINATION:
VITAL SIGNS:  The patient is very cachectic looking, in no acute distress.
HEART:  Irregular rate and rhythm.
LUNGS:  Diminished but clear.
ABDOMEN:  Soft, positive bowel sounds in all four quadrants with no masses or 
tenderness noted.  No edema noted in the lower extremities.
 
LABORATORY DATA:  Hemoglobin is 11.7, it was noted back in 11/2020 she was 11.8;
white count is 11.8; platelets are 248.  BUN is 47, creatinine 1.1.  GFR is 48, 
total bili 0.7.  Alk phos 83, ALT 14, AST is 15.  Her albumin is 3.3, total 
protein 6.9.
 
IMPRESSION:
1.  Rectal bleeding.
2.  Cirrhosis with history of varices and banding in 2019.
3.  Leukocytosis.  History of leukemia.
4.  Myelofibrosis.
5.  Chronic kidney disease.
 
PLAN:
1.  Monitor for overt bleeding.
2.  Consider EGD and a flex sig, but family is being hesitant and wanting to 
proceed with this.  If not necessary, we will decrease her lactulose to once 
daily.
3.  Further recommendations to be made once Dr. Dawson sees the patient later 
today.
 
Thank you for allowing us to participate in this patient's care.  Please do not 
hesitate to call with any questions regarding this consult.
 
Nelson Dawson DO
[facility]/[address] [place]  [address]                    CONSULTATION                  
_______________________________________________________________________________
 
Name:       CHRISTY LR                 Room:           [address] [place]  
M.R.#:  [id number]      Account #:      [id number]  
Admission:  06/20/21     Attend Phys:    JANNY Rizvi
Discharge:  06/21/21     Date of Birth:  [birth year]  
         Report #: [id number]
                                                                     [id number]
_______________________________________________________________________________
 
 
D: 06/21/2021 11:23 AM
T: 06/21/2021 01:38 PM
 
 
 
 
 
 
 
 
 
 
 
 
 
 
 
 
 
 
 
 
 
 
 
 
 
 
 
 
 
 
 
 
 
 
 
 
 
 
 
 
<ELECTRONICALLY SIGNED>
                                        By:  Nelson Dawson DO          
06/22/21     0725
D: 06/21/21 1023_______________________________________
T: 06/21/21 1238Nelson Dawson DO             /nt

## 2021-10-04 ENCOUNTER — HOSPITAL ENCOUNTER (EMERGENCY)
Dept: HOSPITAL 96 - M.ERS | Age: 81
Discharge: HOME | End: 2021-10-04
Payer: MEDICARE

## 2021-10-04 VITALS — SYSTOLIC BLOOD PRESSURE: 91 MMHG | DIASTOLIC BLOOD PRESSURE: 47 MMHG

## 2021-10-04 VITALS — HEIGHT: 60 IN | WEIGHT: 95 LBS | BODY MASS INDEX: 18.65 KG/M2

## 2021-10-04 DIAGNOSIS — Z79.899: ICD-10-CM

## 2021-10-04 DIAGNOSIS — N18.6: ICD-10-CM

## 2021-10-04 DIAGNOSIS — I50.20: ICD-10-CM

## 2021-10-04 DIAGNOSIS — Z90.89: ICD-10-CM

## 2021-10-04 DIAGNOSIS — Z88.0: ICD-10-CM

## 2021-10-04 DIAGNOSIS — Z88.5: ICD-10-CM

## 2021-10-04 DIAGNOSIS — K62.5: ICD-10-CM

## 2021-10-04 DIAGNOSIS — R22.2: ICD-10-CM

## 2021-10-04 DIAGNOSIS — E03.9: ICD-10-CM

## 2021-10-04 DIAGNOSIS — K75.4: Primary | ICD-10-CM

## 2021-10-04 DIAGNOSIS — Z88.2: ICD-10-CM

## 2021-10-04 LAB
ABSOLUTE EOSINOPHILS: 0.2 THOU/UL (ref 0–0.7)
ABSOLUTE MONOCYTES: 1 THOU/UL (ref 0–1.2)
ALBUMIN SERPL-MCNC: 3.5 G/DL (ref 3.4–5)
ALP SERPL-CCNC: 87 U/L (ref 46–116)
ALT SERPL-CCNC: 14 U/L (ref 30–65)
ANION GAP SERPL CALC-SCNC: 10 MMOL/L (ref 7–16)
APTT BLD: 29.1 SECONDS (ref 25–31.3)
AST SERPL-CCNC: 14 U/L (ref 15–37)
BILIRUB SERPL-MCNC: 0.6 MG/DL
BUN SERPL-MCNC: 62 MG/DL (ref 7–18)
CALCIUM SERPL-MCNC: 9 MG/DL (ref 8.5–10.1)
CHLORIDE SERPL-SCNC: 102 MMOL/L (ref 98–107)
CO2 SERPL-SCNC: 22 MMOL/L (ref 21–32)
CREAT SERPL-MCNC: 1.3 MG/DL (ref 0.6–1.3)
EOSINOPHIL NFR BLD: 1 %
GLUCOSE SERPL-MCNC: 111 MG/DL (ref 70–99)
GRANULOCYTES NFR BLD MANUAL: 85 %
HCT VFR BLD CALC: 36.5 % (ref 37–47)
HGB BLD-MCNC: 11.9 GM/DL (ref 12–15)
INR PPP: 1
LYMPHOCYTES # BLD: 1.2 THOU/UL (ref 0.8–5.3)
LYMPHOCYTES NFR BLD AUTO: 7 %
MCH RBC QN AUTO: 27.7 PG (ref 26–34)
MCHC RBC AUTO-ENTMCNC: 32.7 G/DL (ref 28–37)
MCV RBC: 84.6 FL (ref 80–100)
METAMYELOCYTES NFR BLD: 1 %
MONOCYTES NFR BLD: 6 %
MPV: 8.2 FL. (ref 7.2–11.1)
NEUTROPHILS # BLD: 14.9 THOU/UL (ref 1.6–8.1)
NUCLEATED RBCS: 0 /100WBC
PLATELET # BLD EST: ADEQUATE 10*3/UL
PLATELET COUNT*: 276 THOU/UL (ref 150–400)
POTASSIUM SERPL-SCNC: 5.5 MMOL/L (ref 3.5–5.1)
PROT SERPL-MCNC: 6.8 G/DL (ref 6.4–8.2)
PROTHROMBIN TIME: 11 SECONDS (ref 9.2–11.5)
RBC # BLD AUTO: 4.31 MIL/UL (ref 4.2–5)
RBC MORPH BLD: NORMAL
RDW-CV: 18.5 % (ref 10.5–14.5)
SODIUM SERPL-SCNC: 134 MMOL/L (ref 136–145)
WBC # BLD AUTO: 17.3 THOU/UL (ref 4–11)

## 2021-10-07 ENCOUNTER — HOSPITAL ENCOUNTER (INPATIENT)
Dept: HOSPITAL 96 - M.ERS | Age: 81
LOS: 2 days | Discharge: HOME | DRG: 377 | End: 2021-10-09
Attending: INTERNAL MEDICINE | Admitting: INTERNAL MEDICINE
Payer: MEDICARE

## 2021-10-07 VITALS — DIASTOLIC BLOOD PRESSURE: 38 MMHG | SYSTOLIC BLOOD PRESSURE: 69 MMHG

## 2021-10-07 VITALS — HEIGHT: 65 IN | WEIGHT: 103.84 LBS | BODY MASS INDEX: 17.3 KG/M2

## 2021-10-07 VITALS — DIASTOLIC BLOOD PRESSURE: 37 MMHG | SYSTOLIC BLOOD PRESSURE: 69 MMHG

## 2021-10-07 VITALS — DIASTOLIC BLOOD PRESSURE: 54 MMHG | SYSTOLIC BLOOD PRESSURE: 96 MMHG

## 2021-10-07 VITALS — DIASTOLIC BLOOD PRESSURE: 68 MMHG | SYSTOLIC BLOOD PRESSURE: 103 MMHG

## 2021-10-07 VITALS — SYSTOLIC BLOOD PRESSURE: 90 MMHG | DIASTOLIC BLOOD PRESSURE: 50 MMHG

## 2021-10-07 VITALS — SYSTOLIC BLOOD PRESSURE: 84 MMHG | DIASTOLIC BLOOD PRESSURE: 50 MMHG

## 2021-10-07 VITALS — DIASTOLIC BLOOD PRESSURE: 48 MMHG | SYSTOLIC BLOOD PRESSURE: 95 MMHG

## 2021-10-07 VITALS — SYSTOLIC BLOOD PRESSURE: 93 MMHG | DIASTOLIC BLOOD PRESSURE: 51 MMHG

## 2021-10-07 VITALS — DIASTOLIC BLOOD PRESSURE: 48 MMHG | SYSTOLIC BLOOD PRESSURE: 106 MMHG

## 2021-10-07 VITALS — SYSTOLIC BLOOD PRESSURE: 91 MMHG | DIASTOLIC BLOOD PRESSURE: 51 MMHG

## 2021-10-07 VITALS — DIASTOLIC BLOOD PRESSURE: 35 MMHG | SYSTOLIC BLOOD PRESSURE: 73 MMHG

## 2021-10-07 VITALS — SYSTOLIC BLOOD PRESSURE: 91 MMHG | DIASTOLIC BLOOD PRESSURE: 54 MMHG

## 2021-10-07 VITALS — DIASTOLIC BLOOD PRESSURE: 57 MMHG | SYSTOLIC BLOOD PRESSURE: 104 MMHG

## 2021-10-07 DIAGNOSIS — I50.21: ICD-10-CM

## 2021-10-07 DIAGNOSIS — Z88.6: ICD-10-CM

## 2021-10-07 DIAGNOSIS — E87.2: ICD-10-CM

## 2021-10-07 DIAGNOSIS — E87.1: ICD-10-CM

## 2021-10-07 DIAGNOSIS — E43: ICD-10-CM

## 2021-10-07 DIAGNOSIS — I95.89: ICD-10-CM

## 2021-10-07 DIAGNOSIS — Z91.14: ICD-10-CM

## 2021-10-07 DIAGNOSIS — C95.10: ICD-10-CM

## 2021-10-07 DIAGNOSIS — K76.6: ICD-10-CM

## 2021-10-07 DIAGNOSIS — E03.9: ICD-10-CM

## 2021-10-07 DIAGNOSIS — D49.1: ICD-10-CM

## 2021-10-07 DIAGNOSIS — N17.9: ICD-10-CM

## 2021-10-07 DIAGNOSIS — R16.1: ICD-10-CM

## 2021-10-07 DIAGNOSIS — D49.0: ICD-10-CM

## 2021-10-07 DIAGNOSIS — I42.0: ICD-10-CM

## 2021-10-07 DIAGNOSIS — Z90.710: ICD-10-CM

## 2021-10-07 DIAGNOSIS — K74.60: ICD-10-CM

## 2021-10-07 DIAGNOSIS — I48.91: ICD-10-CM

## 2021-10-07 DIAGNOSIS — E87.5: ICD-10-CM

## 2021-10-07 DIAGNOSIS — N18.6: ICD-10-CM

## 2021-10-07 DIAGNOSIS — K92.2: Primary | ICD-10-CM

## 2021-10-07 DIAGNOSIS — Z88.2: ICD-10-CM

## 2021-10-07 DIAGNOSIS — Z88.0: ICD-10-CM

## 2021-10-07 DIAGNOSIS — C92.10: ICD-10-CM

## 2021-10-07 LAB
%HYPO/RBC NFR BLD AUTO: (no result) %
ABSOLUTE BASOPHILS: 0.3 THOU/UL (ref 0–0.2)
ABSOLUTE MONOCYTES: 1.6 THOU/UL (ref 0–1.2)
ALBUMIN SERPL-MCNC: 3.4 G/DL (ref 3.4–5)
ALP SERPL-CCNC: 94 U/L (ref 46–116)
ALT SERPL-CCNC: 13 U/L (ref 30–65)
ANION GAP SERPL CALC-SCNC: 14 MMOL/L (ref 7–16)
ANION GAP SERPL CALC-SCNC: 14 MMOL/L (ref 7–16)
AST SERPL-CCNC: 17 U/L (ref 15–37)
BASOPHILS NFR BLD AUTO: 1 %
BILIRUB SERPL-MCNC: 0.6 MG/DL
BUN SERPL-MCNC: 71 MG/DL (ref 7–18)
BUN SERPL-MCNC: 73 MG/DL (ref 7–18)
CALCIUM SERPL-MCNC: 8.4 MG/DL (ref 8.5–10.1)
CALCIUM SERPL-MCNC: 9.1 MG/DL (ref 8.5–10.1)
CHLORIDE SERPL-SCNC: 97 MMOL/L (ref 98–107)
CHLORIDE SERPL-SCNC: 99 MMOL/L (ref 98–107)
CO2 SERPL-SCNC: 15 MMOL/L (ref 21–32)
CO2 SERPL-SCNC: 15 MMOL/L (ref 21–32)
CREAT SERPL-MCNC: 2.4 MG/DL (ref 0.6–1.3)
CREAT SERPL-MCNC: 2.4 MG/DL (ref 0.6–1.3)
GLUCOSE SERPL-MCNC: 106 MG/DL (ref 70–99)
GLUCOSE SERPL-MCNC: 118 MG/DL (ref 70–99)
GRANULOCYTES NFR BLD MANUAL: 90 %
HCT VFR BLD CALC: 27 % (ref 37–47)
HCT VFR BLD CALC: 34.1 % (ref 37–47)
HGB BLD-MCNC: 10.6 GM/DL (ref 12–15)
HGB BLD-MCNC: 8.6 GM/DL (ref 12–15)
LIPASE: 183 U/L (ref 73–393)
LYMPHOCYTES # BLD: 0.8 THOU/UL (ref 0.8–5.3)
LYMPHOCYTES NFR BLD AUTO: 3 %
MCH RBC QN AUTO: 27.2 PG (ref 26–34)
MCH RBC QN AUTO: 27.4 PG (ref 26–34)
MCHC RBC AUTO-ENTMCNC: 31.1 G/DL (ref 28–37)
MCHC RBC AUTO-ENTMCNC: 31.8 G/DL (ref 28–37)
MCV RBC: 86.2 FL (ref 80–100)
MCV RBC: 87.6 FL (ref 80–100)
MONOCYTES NFR BLD: 6 %
MPV: 8.2 FL. (ref 7.2–11.1)
MPV: 9 FL. (ref 7.2–11.1)
NEUTROPHILS # BLD: 24.2 THOU/UL (ref 1.6–8.1)
NUCLEATED RBCS: 0 /100WBC
OVALOCYTES BLD QL SMEAR: (no result)
PLATELET # BLD EST: ADEQUATE 10*3/UL
PLATELET COUNT*: 289 THOU/UL (ref 150–400)
PLATELET COUNT*: 304 THOU/UL (ref 150–400)
POTASSIUM SERPL-SCNC: 6.9 MMOL/L (ref 3.5–5.1)
POTASSIUM SERPL-SCNC: 7.6 MMOL/L (ref 3.5–5.1)
PROT SERPL-MCNC: 6.5 G/DL (ref 6.4–8.2)
RBC # BLD AUTO: 3.14 MIL/UL (ref 4.2–5)
RBC # BLD AUTO: 3.89 MIL/UL (ref 4.2–5)
RDW-CV: 18.4 % (ref 10.5–14.5)
RDW-CV: 18.5 % (ref 10.5–14.5)
SODIUM SERPL-SCNC: 126 MMOL/L (ref 136–145)
SODIUM SERPL-SCNC: 128 MMOL/L (ref 136–145)
WBC # BLD AUTO: 19.2 THOU/UL (ref 4–11)
WBC # BLD AUTO: 26.9 THOU/UL (ref 4–11)

## 2021-10-07 PROCEDURE — 02HV33Z INSERTION OF INFUSION DEVICE INTO SUPERIOR VENA CAVA, PERCUTANEOUS APPROACH: ICD-10-PCS | Performed by: SURGERY

## 2021-10-07 NOTE — CON
[address]  [address]                    CONSULTATION                  
_______________________________________________________________________________
 
Name:       BECHRISTY C                 Room:           [address]    [place]M [place]  
M.R.#:  [id number]      Account #:      [id number]  
Admission:  10/07/21     Attend Phys:    Richar Hall MD 
Discharge:               Date of Birth:  [birth year]  
         Report #: [id number]
                                                                     [id number]
_______________________________________________________________________________
THIS REPORT FOR:  
 
cc:  Jackelin Cruz Angela Jo RNP Vasudeva, Amita MD                                                ~
 
 
DATE OF CONSULTATION: 10/08/2021
 
NEPHROLOGY CONSULTATION
 
CONSULTING PHYSICIAN:  Dr. Ponce.
 
REASON FOR NEPHROLOGY CONSULTATION:  Hyperkalemia and acute kidney injury.
 
REASON FOR ADMISSION:  GI bleed.
 
HISTORY OF PRESENT ILLNESS:  This is an 80-year-old lady with multiple medical 
comorbidities with possible right lower lobe mass for which family has deferred 
treatment, she is under the of care of her family and under the care of 
naturopathic doctors, came in with GI bleed.  The patient had been having 
hematochezia.  Apparently, the patient came over the weekend and ER wanted to 
admit her because of GI bleeding, but family refused.  Her daughter takes care 
of her at home.  Her creatinine was 1.3 on 10/04 and yesterday when she came in,
her creatinine was 2.4, her potassium was 7.6, her sodium was 126.  She was 
started on IV fluids with which her sodium has come up to 131, potassium is 5.8 
this morning, creatinine is still 2.5.  She also gets spironolactone, Lasix, and
digoxin at home.  This is because of liver cirrhosis and AFib respectively.  She
does follow with Dr. Ortega as outpatient cardiology.  She also takes midodrine 
at home for hypotension.  Her blood pressure was as low as 60s yesterday. It is 
currently better, 80 systolic with Levophed.  The patient is drowsy, but she is 
easily arousable and she is oriented x 3.  She does have evidence of liver 
lesion as well, which could be metastasis or primary malignancy.
 
ALLERGIES:  PENICILLIN, SULFA, AND CODEINE.
 
REVIEW OF SYSTEMS:  As mentioned in history of present illness, she is very weak
and she is unable to provide detailed review of systems.
 
HOME MEDICATIONS:  Include spironolactone 50 mg a day, lactulose 20 mg b.i.d., 
midodrine, levothyroxine, furosemide and digoxin 0.125 mg daily.
 
PAST MEDICAL AND SURGICAL HISTORY: Includes appendicitis; tonsillectomy; 
leukemia; enlarged spleen; CHF, ejection fraction 35%; ESLD; autoimmune 
hepatitis; prolapsed uterus and bladder; hypothyroidism; possible right lower 
lobe mass, possible malignancy; two hernias; small mass in the abdominal cavity;
 
 
 
[place] [address]                    CONSULTATION                  
_______________________________________________________________________________
 
Name:       BECHRISTY MONCHO                 Room:           [address] [place]  
..#:  [id number]      Account #:      [id number]  
Admission:  10/07/21     Attend Phys:    Richar Hall MD 
Discharge:               Date of Birth:  [birth year]  
         Report #: [id number]
                                                                     [id number]
_______________________________________________________________________________
 
 
myelofibrosis and chronic pancytopenia; GI bleeds, variceal and cholelithiasis.
 
FAMILY HISTORY:  Noncontributory at 80 years of age.
 
SOCIAL HISTORY:  Lives at home.  Daughter takes care of her, under the care of a
naturopathic doctors. Does not take any recreational drugs.  Does not use 
tobacco, does not use alcohol.
 
PHYSICAL EXAMINATION:
VITAL SIGNS:  Blood pressure 86/47, temperature is afebrile, pulse rate was 72, 
respiratory rate was 17, temperature was 36.6, pulse ox was 96%.  She was on 
room air.
GENERAL:  She is drowsy, but she is arousable.  She is oriented x 3.
HEAD AND EYES:  Atraumatic and normocephalic.  Conjunctivae are normal.
EARS, NOSE AND THROAT:  Normal ears and nose. Mucous membranes are dry.
NECK:  No JVD.
CHEST:  Bilaterally diminished breath sounds.  No crackles or wheezing.
CARDIOVASCULAR:  S1, S2 normal.  No murmur.
ABDOMEN:  Soft, nondistended, nontender.
LOWER EXTREMITIES:  There is no lower extremity edema.
NEUROLOGICAL FUNCTION:  She is drowsy.
PSYCHIATRIC:  Unable to assess.
 
LABORATORY DATA:  WBC is 18.0, hemoglobin is 8.0, platelet count is 283.  Sodium
is 131, potassium is 5.8, creatinine is 2.5, BUN is 63, CO2 is 21.  Other labs 
were reviewed.
 
IMAGING:  Chest x-ray was reviewed.
 
ASSESSMENT:
1.  Acute kidney injury in the setting of a gastrointestinal bleed, severe 
hypotension, volume depletion, use of spironolactone as well as Lasix at home.  
She has a creatinine of 2.5 today, it was 2.4 on admission. Creatinine was 1.3 
on 10/04 and back in June was 1.1.  UA has not been checked, but apparently, the
patient has been making urine.  Renal imaging not available, not done.
2.  Acute lower gastrointestinal bleed. GI is following.  She is on octreotide 
as well as Protonix drip.  Hemoglobin 8.0, hemoglobin was in the range of 11 
previously.
3.  Liver cirrhosis.  She is on spironolactone and Lasix at home.
4.  Atrial fibrillation.  She follows with Cardiology. She is on digoxin at 
home.
5.  Chronic hypotension.  She is on midodrine at home.
6.  Anion gap metabolic acidosis in the setting of acute kidney injury.
7.  Liver lesion, could be metastasis.
 
 
 
[place] [address]                    CONSULTATION                  
_______________________________________________________________________________
 
Name:       CHRISTY LR MONCHO                 Room:           [address]    [place]M [place]  
M.R.#:  [id number]      Account #:      [id number]  
Admission:  10/07/21     Attend Phys:    Richar Hall MD 
Discharge:               Date of Birth:  [birth year]  
         Report #: [id number]
                                                                     [id number]
_______________________________________________________________________________
 
 
8.  Possible right lower lobe mass, which is possible malignancy. The family has
refused treatment or evaluation of that.
 
The patient takes a lot of naturopathic medicines.
 
PLAN:
1.  From renal standpoint, creatinine is stable, apparently she is making urine 
and I am not sure how much because it is not being recorded.
2.  Sodium and potassium have improved with IV fluids.  She is on bicarbonate 
drip right now, which can be continued.
3.  She is not a candidate of dialysis given her multiple comorbidities, 
possible malignancy, severe protein-calorie malnutrition, liver cirrhosis.  This
was explained to the patient's daughter and she completely understands that.
4.  No need for renal imaging right now because it is not going to change 
management, UA can be done if possible.  I's and O's can be monitored.
5.  I would advise against aggressive measures and I would recommend comfort 
measures.
6.  There is nothing else to offer from a Nephrology standpoint and we will be 
signing off now and I discussed with patient's daughter, the patient's nurse as 
well as hospitalist, Dr. Ponce and I spent 35 minutes in critical care.  This 
time was spent in chart review, placing orders and care coordination.
 
 
 
 
 
 
 
 
 
 
 
 
 
 
 
 
 
 
 
 
 
                       
                                        By:                                
                 
D: 10/08/21 0831_______________________________________
T: 10/08/21 0915Muna Menendez MD               /nt

## 2021-10-07 NOTE — [place]KG
[place] [address]
Phone:  [phone]                     ELECTROCARDIOGRAM REPORT      
_______________________________________________________________________________
 
Name:         BECHRISTY FLORES                Room:          [place] [address]   ADM [place] 
.R.#:    [id number]     Account #:     [id number]  
Admission:    10/07/21    Attend Phys:   Richar Hall, 
Discharge:                Date of Birth: [birth year]  
Date of Service: 10/07/21 1018  Report #:      [id number]
        [id number]
_______________________________________________________________________________
THIS REPORT FOR:  //name//                      
 
                         [facility] ED
                                       
Test Date:    2021-10-07               Test Time:    10:18:29
Pat Name:     CHRISTY LR             Department:   
Patient ID:   [id number]            Room:         [facility]
Gender:       F                        Technician:   CD
:          1940               Requested By: Arie Delatorre
Order Number: [id number] MD:   Tacho Ortega
                                 Measurements
Intervals                              Axis          
Rate:         83                       P:            
ND:                                    QRS:          54
QRSD:         171                      T:            107
QT:           397                                    
QTc:          467                                    
                           Interpretive Statements
Sinus rhythm
PAC's
Nonspecific intraventricular conduction delay
Borderline repolarization abnormality
Electronically Signed On 10-7-2021 11:23:13 CDT by Tacho Ortega
[email or web address]
 
 
 
 
 
 
 
 
 
 
 
 
 
 
 
 
 
 
 
 
  <ELECTRONICALLY SIGNED>
                                           By: Tacho Ortega MD, FAC   
  10/07/21     1123
D: 10/07/21 1018   _____________________________________
T: 10/07/21 1018   Tacho Ortega MD, [facility]     /EPI

## 2021-10-08 VITALS — DIASTOLIC BLOOD PRESSURE: 46 MMHG | SYSTOLIC BLOOD PRESSURE: 84 MMHG

## 2021-10-08 VITALS — SYSTOLIC BLOOD PRESSURE: 92 MMHG | DIASTOLIC BLOOD PRESSURE: 54 MMHG

## 2021-10-08 VITALS — SYSTOLIC BLOOD PRESSURE: 138 MMHG | DIASTOLIC BLOOD PRESSURE: 61 MMHG

## 2021-10-08 VITALS — SYSTOLIC BLOOD PRESSURE: 89 MMHG | DIASTOLIC BLOOD PRESSURE: 52 MMHG

## 2021-10-08 VITALS — SYSTOLIC BLOOD PRESSURE: 85 MMHG | DIASTOLIC BLOOD PRESSURE: 43 MMHG

## 2021-10-08 VITALS — SYSTOLIC BLOOD PRESSURE: 93 MMHG | DIASTOLIC BLOOD PRESSURE: 53 MMHG

## 2021-10-08 VITALS — SYSTOLIC BLOOD PRESSURE: 82 MMHG | DIASTOLIC BLOOD PRESSURE: 49 MMHG

## 2021-10-08 VITALS — SYSTOLIC BLOOD PRESSURE: 85 MMHG | DIASTOLIC BLOOD PRESSURE: 44 MMHG

## 2021-10-08 VITALS — SYSTOLIC BLOOD PRESSURE: 86 MMHG | DIASTOLIC BLOOD PRESSURE: 45 MMHG

## 2021-10-08 VITALS — SYSTOLIC BLOOD PRESSURE: 95 MMHG | DIASTOLIC BLOOD PRESSURE: 51 MMHG

## 2021-10-08 VITALS — DIASTOLIC BLOOD PRESSURE: 49 MMHG | SYSTOLIC BLOOD PRESSURE: 90 MMHG

## 2021-10-08 VITALS — DIASTOLIC BLOOD PRESSURE: 51 MMHG | SYSTOLIC BLOOD PRESSURE: 90 MMHG

## 2021-10-08 VITALS — DIASTOLIC BLOOD PRESSURE: 52 MMHG | SYSTOLIC BLOOD PRESSURE: 94 MMHG

## 2021-10-08 VITALS — SYSTOLIC BLOOD PRESSURE: 99 MMHG | DIASTOLIC BLOOD PRESSURE: 54 MMHG

## 2021-10-08 VITALS — DIASTOLIC BLOOD PRESSURE: 48 MMHG | SYSTOLIC BLOOD PRESSURE: 86 MMHG

## 2021-10-08 VITALS — SYSTOLIC BLOOD PRESSURE: 91 MMHG | DIASTOLIC BLOOD PRESSURE: 54 MMHG

## 2021-10-08 VITALS — DIASTOLIC BLOOD PRESSURE: 54 MMHG | SYSTOLIC BLOOD PRESSURE: 91 MMHG

## 2021-10-08 VITALS — SYSTOLIC BLOOD PRESSURE: 94 MMHG | DIASTOLIC BLOOD PRESSURE: 50 MMHG

## 2021-10-08 VITALS — DIASTOLIC BLOOD PRESSURE: 53 MMHG | SYSTOLIC BLOOD PRESSURE: 97 MMHG

## 2021-10-08 VITALS — DIASTOLIC BLOOD PRESSURE: 43 MMHG | SYSTOLIC BLOOD PRESSURE: 86 MMHG

## 2021-10-08 VITALS — SYSTOLIC BLOOD PRESSURE: 87 MMHG | DIASTOLIC BLOOD PRESSURE: 49 MMHG

## 2021-10-08 VITALS — DIASTOLIC BLOOD PRESSURE: 63 MMHG | SYSTOLIC BLOOD PRESSURE: 104 MMHG

## 2021-10-08 VITALS — DIASTOLIC BLOOD PRESSURE: 61 MMHG | SYSTOLIC BLOOD PRESSURE: 92 MMHG

## 2021-10-08 VITALS — DIASTOLIC BLOOD PRESSURE: 46 MMHG | SYSTOLIC BLOOD PRESSURE: 90 MMHG

## 2021-10-08 VITALS — DIASTOLIC BLOOD PRESSURE: 37 MMHG | SYSTOLIC BLOOD PRESSURE: 80 MMHG

## 2021-10-08 VITALS — DIASTOLIC BLOOD PRESSURE: 38 MMHG | SYSTOLIC BLOOD PRESSURE: 83 MMHG

## 2021-10-08 VITALS — DIASTOLIC BLOOD PRESSURE: 49 MMHG | SYSTOLIC BLOOD PRESSURE: 87 MMHG

## 2021-10-08 VITALS — DIASTOLIC BLOOD PRESSURE: 44 MMHG | SYSTOLIC BLOOD PRESSURE: 85 MMHG

## 2021-10-08 VITALS — SYSTOLIC BLOOD PRESSURE: 94 MMHG | DIASTOLIC BLOOD PRESSURE: 56 MMHG

## 2021-10-08 VITALS — DIASTOLIC BLOOD PRESSURE: 60 MMHG | SYSTOLIC BLOOD PRESSURE: 100 MMHG

## 2021-10-08 VITALS — SYSTOLIC BLOOD PRESSURE: 93 MMHG | DIASTOLIC BLOOD PRESSURE: 42 MMHG

## 2021-10-08 VITALS — SYSTOLIC BLOOD PRESSURE: 105 MMHG | DIASTOLIC BLOOD PRESSURE: 52 MMHG

## 2021-10-08 VITALS — SYSTOLIC BLOOD PRESSURE: 86 MMHG | DIASTOLIC BLOOD PRESSURE: 48 MMHG

## 2021-10-08 VITALS — DIASTOLIC BLOOD PRESSURE: 49 MMHG | SYSTOLIC BLOOD PRESSURE: 98 MMHG

## 2021-10-08 VITALS — SYSTOLIC BLOOD PRESSURE: 82 MMHG | DIASTOLIC BLOOD PRESSURE: 37 MMHG

## 2021-10-08 VITALS — DIASTOLIC BLOOD PRESSURE: 40 MMHG | SYSTOLIC BLOOD PRESSURE: 68 MMHG

## 2021-10-08 VITALS — SYSTOLIC BLOOD PRESSURE: 101 MMHG | DIASTOLIC BLOOD PRESSURE: 60 MMHG

## 2021-10-08 VITALS — SYSTOLIC BLOOD PRESSURE: 92 MMHG | DIASTOLIC BLOOD PRESSURE: 51 MMHG

## 2021-10-08 VITALS — SYSTOLIC BLOOD PRESSURE: 97 MMHG | DIASTOLIC BLOOD PRESSURE: 52 MMHG

## 2021-10-08 VITALS — DIASTOLIC BLOOD PRESSURE: 47 MMHG | SYSTOLIC BLOOD PRESSURE: 82 MMHG

## 2021-10-08 VITALS — DIASTOLIC BLOOD PRESSURE: 52 MMHG | SYSTOLIC BLOOD PRESSURE: 98 MMHG

## 2021-10-08 VITALS — SYSTOLIC BLOOD PRESSURE: 81 MMHG | DIASTOLIC BLOOD PRESSURE: 50 MMHG

## 2021-10-08 VITALS — DIASTOLIC BLOOD PRESSURE: 42 MMHG | SYSTOLIC BLOOD PRESSURE: 88 MMHG

## 2021-10-08 VITALS — DIASTOLIC BLOOD PRESSURE: 47 MMHG | SYSTOLIC BLOOD PRESSURE: 85 MMHG

## 2021-10-08 VITALS — DIASTOLIC BLOOD PRESSURE: 53 MMHG | SYSTOLIC BLOOD PRESSURE: 95 MMHG

## 2021-10-08 VITALS — DIASTOLIC BLOOD PRESSURE: 52 MMHG | SYSTOLIC BLOOD PRESSURE: 96 MMHG

## 2021-10-08 VITALS — SYSTOLIC BLOOD PRESSURE: 90 MMHG | DIASTOLIC BLOOD PRESSURE: 48 MMHG

## 2021-10-08 VITALS — DIASTOLIC BLOOD PRESSURE: 56 MMHG | SYSTOLIC BLOOD PRESSURE: 95 MMHG

## 2021-10-08 VITALS — DIASTOLIC BLOOD PRESSURE: 53 MMHG | SYSTOLIC BLOOD PRESSURE: 102 MMHG

## 2021-10-08 VITALS — DIASTOLIC BLOOD PRESSURE: 52 MMHG | SYSTOLIC BLOOD PRESSURE: 93 MMHG

## 2021-10-08 VITALS — DIASTOLIC BLOOD PRESSURE: 53 MMHG | SYSTOLIC BLOOD PRESSURE: 85 MMHG

## 2021-10-08 VITALS — SYSTOLIC BLOOD PRESSURE: 89 MMHG | DIASTOLIC BLOOD PRESSURE: 56 MMHG

## 2021-10-08 VITALS — DIASTOLIC BLOOD PRESSURE: 45 MMHG | SYSTOLIC BLOOD PRESSURE: 84 MMHG

## 2021-10-08 VITALS — SYSTOLIC BLOOD PRESSURE: 86 MMHG | DIASTOLIC BLOOD PRESSURE: 49 MMHG

## 2021-10-08 VITALS — DIASTOLIC BLOOD PRESSURE: 48 MMHG | SYSTOLIC BLOOD PRESSURE: 90 MMHG

## 2021-10-08 VITALS — DIASTOLIC BLOOD PRESSURE: 46 MMHG | SYSTOLIC BLOOD PRESSURE: 97 MMHG

## 2021-10-08 VITALS — DIASTOLIC BLOOD PRESSURE: 47 MMHG | SYSTOLIC BLOOD PRESSURE: 86 MMHG

## 2021-10-08 VITALS — SYSTOLIC BLOOD PRESSURE: 92 MMHG | DIASTOLIC BLOOD PRESSURE: 58 MMHG

## 2021-10-08 LAB
ABSOLUTE BASOPHILS: 0.2 THOU/UL (ref 0–0.2)
ABSOLUTE EOSINOPHILS: 0.3 THOU/UL (ref 0–0.7)
ABSOLUTE MONOCYTES: 0.9 THOU/UL (ref 0–1.2)
ALBUMIN SERPL-MCNC: 2.6 G/DL (ref 3.4–5)
ALP SERPL-CCNC: 73 U/L (ref 46–116)
ALT SERPL-CCNC: 9 U/L (ref 30–65)
ANION GAP SERPL CALC-SCNC: 11 MMOL/L (ref 7–16)
AST SERPL-CCNC: 12 U/L (ref 15–37)
BASOPHILS NFR BLD AUTO: 1 %
BILIRUB SERPL-MCNC: 0.5 MG/DL
BUN SERPL-MCNC: 63 MG/DL (ref 7–18)
CALCIUM SERPL-MCNC: 8.1 MG/DL (ref 8.5–10.1)
CHLORIDE SERPL-SCNC: 99 MMOL/L (ref 98–107)
CO2 SERPL-SCNC: 21 MMOL/L (ref 21–32)
CREAT SERPL-MCNC: 2.5 MG/DL (ref 0.6–1.3)
EOSINOPHIL NFR BLD: 1.4 %
GLUCOSE SERPL-MCNC: 196 MG/DL (ref 70–99)
GRANULOCYTES NFR BLD MANUAL: 88.7 %
HCT VFR BLD CALC: 24.3 % (ref 37–47)
HGB BLD-MCNC: 8 GM/DL (ref 12–15)
INR PPP: 1.1
LYMPHOCYTES # BLD: 0.7 THOU/UL (ref 0.8–5.3)
LYMPHOCYTES NFR BLD AUTO: 4 %
MCH RBC QN AUTO: 28 PG (ref 26–34)
MCHC RBC AUTO-ENTMCNC: 32.8 G/DL (ref 28–37)
MCV RBC: 85.3 FL (ref 80–100)
MONOCYTES NFR BLD: 4.9 %
MPV: 9.2 FL. (ref 7.2–11.1)
NEUTROPHILS # BLD: 16 THOU/UL (ref 1.6–8.1)
NUCLEATED RBCS: 0 /100WBC
PLATELET COUNT*: 283 THOU/UL (ref 150–400)
POTASSIUM SERPL-SCNC: 5.8 MMOL/L (ref 3.5–5.1)
PROT SERPL-MCNC: 5.2 G/DL (ref 6.4–8.2)
PROTHROMBIN TIME: 11.4 SECONDS (ref 9.2–11.5)
RBC # BLD AUTO: 2.85 MIL/UL (ref 4.2–5)
RDW-CV: 18.6 % (ref 10.5–14.5)
SODIUM SERPL-SCNC: 131 MMOL/L (ref 136–145)
WBC # BLD AUTO: 18 THOU/UL (ref 4–11)

## 2021-10-09 VITALS — DIASTOLIC BLOOD PRESSURE: 50 MMHG | SYSTOLIC BLOOD PRESSURE: 87 MMHG

## 2021-10-09 VITALS — SYSTOLIC BLOOD PRESSURE: 81 MMHG | DIASTOLIC BLOOD PRESSURE: 45 MMHG

## 2021-10-09 VITALS — SYSTOLIC BLOOD PRESSURE: 92 MMHG | DIASTOLIC BLOOD PRESSURE: 49 MMHG

## 2021-10-09 VITALS — DIASTOLIC BLOOD PRESSURE: 47 MMHG | SYSTOLIC BLOOD PRESSURE: 94 MMHG

## 2021-10-09 VITALS — SYSTOLIC BLOOD PRESSURE: 87 MMHG | DIASTOLIC BLOOD PRESSURE: 45 MMHG

## 2021-10-09 VITALS — DIASTOLIC BLOOD PRESSURE: 52 MMHG | SYSTOLIC BLOOD PRESSURE: 70 MMHG

## 2021-10-09 VITALS — SYSTOLIC BLOOD PRESSURE: 87 MMHG | DIASTOLIC BLOOD PRESSURE: 48 MMHG

## 2021-10-09 VITALS — SYSTOLIC BLOOD PRESSURE: 99 MMHG | DIASTOLIC BLOOD PRESSURE: 54 MMHG

## 2021-10-09 VITALS — SYSTOLIC BLOOD PRESSURE: 85 MMHG | DIASTOLIC BLOOD PRESSURE: 51 MMHG

## 2021-10-09 VITALS — SYSTOLIC BLOOD PRESSURE: 97 MMHG | DIASTOLIC BLOOD PRESSURE: 40 MMHG

## 2021-10-09 VITALS — DIASTOLIC BLOOD PRESSURE: 47 MMHG | SYSTOLIC BLOOD PRESSURE: 79 MMHG

## 2021-10-09 VITALS — DIASTOLIC BLOOD PRESSURE: 46 MMHG | SYSTOLIC BLOOD PRESSURE: 89 MMHG

## 2021-10-09 VITALS — SYSTOLIC BLOOD PRESSURE: 87 MMHG | DIASTOLIC BLOOD PRESSURE: 41 MMHG

## 2021-10-09 VITALS — SYSTOLIC BLOOD PRESSURE: 82 MMHG | DIASTOLIC BLOOD PRESSURE: 49 MMHG

## 2021-10-09 VITALS — DIASTOLIC BLOOD PRESSURE: 53 MMHG | SYSTOLIC BLOOD PRESSURE: 95 MMHG

## 2021-10-09 VITALS — SYSTOLIC BLOOD PRESSURE: 82 MMHG | DIASTOLIC BLOOD PRESSURE: 48 MMHG

## 2021-10-09 VITALS — SYSTOLIC BLOOD PRESSURE: 88 MMHG | DIASTOLIC BLOOD PRESSURE: 42 MMHG

## 2021-10-09 VITALS — DIASTOLIC BLOOD PRESSURE: 48 MMHG | SYSTOLIC BLOOD PRESSURE: 85 MMHG

## 2021-10-09 VITALS — SYSTOLIC BLOOD PRESSURE: 90 MMHG | DIASTOLIC BLOOD PRESSURE: 50 MMHG

## 2021-10-09 VITALS — SYSTOLIC BLOOD PRESSURE: 83 MMHG | DIASTOLIC BLOOD PRESSURE: 51 MMHG

## 2021-10-09 LAB
ANION GAP SERPL CALC-SCNC: 7 MMOL/L (ref 7–16)
BUN SERPL-MCNC: 54 MG/DL (ref 7–18)
CALCIUM SERPL-MCNC: 7.3 MG/DL (ref 8.5–10.1)
CHLORIDE SERPL-SCNC: 96 MMOL/L (ref 98–107)
CO2 SERPL-SCNC: 29 MMOL/L (ref 21–32)
CREAT SERPL-MCNC: 2.4 MG/DL (ref 0.6–1.3)
GLUCOSE SERPL-MCNC: 145 MG/DL (ref 70–99)
HCT VFR BLD CALC: 23.4 % (ref 37–47)
HGB BLD-MCNC: 7.9 GM/DL (ref 12–15)
MCH RBC QN AUTO: 28.6 PG (ref 26–34)
MCHC RBC AUTO-ENTMCNC: 33.9 G/DL (ref 28–37)
MCV RBC: 84.5 FL (ref 80–100)
MPV: 9.1 FL. (ref 7.2–11.1)
PLATELET COUNT*: 248 THOU/UL (ref 150–400)
POTASSIUM SERPL-SCNC: 5.3 MMOL/L (ref 3.5–5.1)
RBC # BLD AUTO: 2.77 MIL/UL (ref 4.2–5)
RDW-CV: 18 % (ref 10.5–14.5)
SODIUM SERPL-SCNC: 132 MMOL/L (ref 136–145)
WBC # BLD AUTO: 13.1 THOU/UL (ref 4–11)

## 2021-10-09 NOTE — CON
[address]  [address]                    CONSULTATION                  
_______________________________________________________________________________
 
Name:       CHRISTY LR                 Room:           [address] [place]  
M.R.#:  [id number]      Account #:      [id number]  
Admission:  10/07/21     Attend Phys:    Richar Hall MD 
Discharge:  10/09/21     Date of Birth:  [birth year]  
         Report #: [id number]
                                                                     [id number]
_______________________________________________________________________________
THIS REPORT FOR:  
 
cc:  Jackelin Cruz Angela Jo RNP Vardakis, Gregory DO ~
 
 
 
cc:     Richar Hall MD, ALONZO Hu
DATE OF CONSULTATION: 10/07/2021
 
REFERRING PHYSICIAN:  Richar Hall MD.
 
REASON FOR CONSULTATION:
1.  Overt hematochezia, uncertain etiology.
2.  Hypotension, which is multifactorial (GI blood loss, dilated cardiomyopathy 
and renal failure).
3.  Decompensated liver disease with history of variceal bleeding.
4.  Acute renal failure superimposed on chronic kidney disease with significant 
hyperkalemia.
5.  Chronic leukemia (CML or some type of a myeloproliferative disorder without 
any previous treatment per patient request).
6.  Large right pleural based mass (workup deferred by patient and family).
7.  Protein calorie malnutrition.
8.  Medication noncompliance.
 
RECOMMENDATIONS:
1.  Began the patient on octreotide in case this is an upper GI bleed related to
portal hypertension, specifically esophageal varices.
2.  We will also continue with Protonix drip at this time.
3.  We would attempt to correct the patient, we will continue to attempt to 
correct the patient's hyperkalemia and help improve her acute renal failure.
4.  EGD would only be done if the patient becomes hemodynamically stable and her
hyperkalemia can be contracted.  This was also be continued upon the patient's 
family, the patient and her family agreeing to the same.
5.  The patient and family to decide how far they wished for us to proceed with 
the patient's care.  In the past, they have always refused all intervention and 
there is only so much we can do at this time.
6.  Her overall prognosis is very guarded at this time.  I have discussed these 
plans with the patient as well as her daughter at the bedside and they are 
agreeable to same.
 
HISTORY OF PRESENT ILLNESS:  The patient is an 80-year-old white female well 
known to me from previous hospitalizations in the past, dating back to 2019 
where she has had issues with decompensated liver disease from nonalcoholic 
fatty liver and variceal bleeding.  She was last seen by us back in 06/2021 at 
 
 
 
[place] [address]                    CONSULTATION                  
_______________________________________________________________________________
 
Name:       CHRISTY LR                 Room:           [address]#:  [id number]      Account #:      [id number]  
Admission:  10/07/21     Attend Phys:    Richar Hall MD 
Discharge:  10/09/21     Date of Birth:  [birth year]  
         Report #: [id number]
                                                                     [id number]
_______________________________________________________________________________
 
 
which time she was also having issues with bright red bleeding at that time.  
She presented to the Emergency Room at this time with similar type complaints of
overt hematochezia with associated weakness.  Her story is similar to the 
hospital stay that she had back in June, at which time they refused all 
intervention.  The patient is sleepy at this time and basically lies around all 
day long typically.  She can be awoken, arouses very easily.  She and her family
have chosen enteropathic medication to help with her long-term issues.  She is 
currently in the ICU because of hypotension and acute renal failure with 
hyperkalemia.
 
ALLERGIES:  PENICILLIN, SULFA, AND CODEINE.
 
MEDICATIONS:  Midodrine 15 mg up to 3 times daily, spironolactone 50 mg once 
daily, Lasix 20 mg daily, lactulose 20 grams up to twice daily, and 
levothyroxine, but it is unclear whether or not she continues all these 
medications.
 
PAST MEDICAL HISTORY:  Remarkable for decompensated liver disease secondary to 
cirrhosis with history of variceal bleeding.  She also has a problem with 
chronic kidney disease, hypothyroidism.  She has some type of myelofibrosis or 
myeloproliferative disorder with chronic pancytopenia.  She does have history of
cholelithiasis, which is asymptomatic.
 
PAST SURGICAL HISTORY:  She had previous remote hysterectomy, appendectomy, 
tonsillectomy.
 
SOCIAL HISTORY:  The patient lives with her  and daughter.
 
PHYSICAL EXAMINATION:
GENERAL:  Ill-appearing 80-year-old white female who is awake, but lethargic.
CARDIOPULMONARY:  Revealed a regular rate and rhythm.
LUNGS:  Clear with diminished breath sounds on the right base.
ABDOMEN:  Soft, not tender.  No rebound or guarding noted.
 
LABORATORY DATA:  From admission revealed white count 26.9, hemoglobin 10.6 and 
platelet count 304,000.  Her sodium 126, potassium 7.6, chloride 97, bicarb 15, 
BUN 73, creatinine 2.4 for GFR of only 19.  Total bilirubin 0.6, alkaline 
phosphatase 94, AST 17, ALT 13, albumin 3.4.  By comparison, her BUN and 
creatinine were 44 and 1.1 back in 06/2021.  Liver function tests were 
essentially unchanged.
 
CT scan of the abdomen and pelvis with angiogram revealed a bilobed 16 cm mass 
in the right lower chest similar to the previous one.  She has splenomegaly and 
dilated splenic vein and portal vein similar.  She also has a 2 cm cystic lesion
 
 
 
[place]  [address]                    CONSULTATION                  
_______________________________________________________________________________
 
Name:       CHRISTY LR                 Room:           [address] [place]  
M.R.#:  [id number]      Account #:      [id number]  
Admission:  10/07/21     Attend Phys:    Richar Hall MD 
Discharge:  10/09/21     Date of Birth:  [birth year]  
         Report #: [id number]
                                                                     [id number]
_______________________________________________________________________________
 
 
in the pancreas, which is similar to the CT findings in 06/2019.  She also has a
heterogeneous appearing liver.
 
DISCUSSION:  At the present time, the patient is in critical condition at this 
time.  It is unclear whether they wanted to proceed with any interventions or 
not.  She is not in any shape to have any type of sedation at this time with her
hypotension and hyperkalemia.  We will watch her expectantly at this time.
 
 
 
 
 
 
 
 
 
 
 
 
 
 
 
 
 
 
 
 
 
 
 
 
 
 
 
 
 
 
 
 
 
 
 
<ELECTRONICALLY SIGNED>
                                        By:  Nelson Dawson DO          
10/09/21     2300
D: 10/09/21 1450_______________________________________
T: 10/09/21 2025Nelson Dawson DO             /nt

## 2022-01-17 NOTE — NUR
Cardiac   WOUND CARE NOTE:  CONSULT RECEIVED FOR L. ISCHIAL TUBEROSITY NEARLY HEALED.
 
PATIENT PRESENTS WITH TWO HEALING LESIONS TO THE LEFT BUTTOCK.  0.1X0.1X0.1
DISTAL WOUND AND 1X0.2X0.1 PROXIMAL WOUND.  CAMILLE-WOUND TO BOTH WITH NEW
EPITHELIUM.  DRY, YELLOW WOUND BEDS.
 
RECOMMEND
BARRIER OINTMENT BID AND PRN INCONTINENCE
TURN Q2 HOURS.
ENCOURAGE GOOD NUTRTION/HYDRATION FOR WOUND HEALING.

## 2023-04-12 NOTE — NUR
SW met with pt and pt  and one of pt dtrs in preparation for team
conference and they did not have any questions at this time just concern for
pt back pain to improve. Pt dtr feels that pt back pain may be slowing down pt
recovery time.  Pt other dtr wanted to speak with SW about insurance etc and
SW provided SW number to dtr to provide to other dtr.  SW to continue to
follow to assist with safe dc planning. 0

## 2024-11-05 NOTE — NUR
SLEPT OFF AND ON BETWEEN VOIDINGS. USED BSC MOST OF THE NIGHT. HAD ONE LIQUID
STOOL WITH PARTIALLY DIGESTED FOODS. MEDICATED ONCE FOR PAIN. OVERALL HAS
VOIDED LESS FREQUENTLY THIS SHIFT COMPARED TO THE NIGHT SHIFT OF 4/15-16.
PATIENT HAS BEEN REQUESTING MOVING HER FORWARDS TO THE EDGE OF THE BED USING
THE DRAWSHEET INSTEAD OF HER MOVING HERSELF. SOMETIMES WANTS HELP GOING FROM
LYING TO SITTING, OTHER TIMES NOT. ABLE TO RISE FROM SITTING TO STANDING. GETS
BACK INTO BED WITHOUT DIFF. PREFERS TO LIE ON LEFT SIDE. DAUGHTER HERE BUT HAS
NOT ASSISTED PATIENT MUCH THIS PM. HOURLY ROUNDS CONTINUE. CALL LITE IN REACH. No